# Patient Record
Sex: FEMALE | Race: WHITE | Employment: OTHER | URBAN - METROPOLITAN AREA
[De-identification: names, ages, dates, MRNs, and addresses within clinical notes are randomized per-mention and may not be internally consistent; named-entity substitution may affect disease eponyms.]

---

## 2017-01-11 ENCOUNTER — OFFICE VISIT (OUTPATIENT)
Dept: FAMILY MEDICINE CLINIC | Age: 77
End: 2017-01-11

## 2017-01-11 VITALS
HEIGHT: 58 IN | DIASTOLIC BLOOD PRESSURE: 62 MMHG | WEIGHT: 125 LBS | HEART RATE: 76 BPM | BODY MASS INDEX: 26.24 KG/M2 | SYSTOLIC BLOOD PRESSURE: 118 MMHG

## 2017-01-11 DIAGNOSIS — I10 ESSENTIAL HYPERTENSION: Primary | Chronic | ICD-10-CM

## 2017-01-11 DIAGNOSIS — R20.0 NUMBNESS IN LEFT LEG: ICD-10-CM

## 2017-01-11 DIAGNOSIS — M79.605 LEFT LEG PAIN: ICD-10-CM

## 2017-01-11 DIAGNOSIS — I49.9 IRREGULAR HEART BEAT: ICD-10-CM

## 2017-01-11 DIAGNOSIS — M81.0 OSTEOPOROSIS: ICD-10-CM

## 2017-01-11 LAB
BASOPHILS ABSOLUTE: 0 K/UL (ref 0–0.2)
BASOPHILS RELATIVE PERCENT: 0.7 %
BILIRUBIN URINE: NEGATIVE
BLOOD, URINE: NEGATIVE
CLARITY: CLEAR
COLOR: YELLOW
EOSINOPHILS ABSOLUTE: 0.1 K/UL (ref 0–0.6)
EOSINOPHILS RELATIVE PERCENT: 1.5 %
GLUCOSE URINE: NEGATIVE MG/DL
HCT VFR BLD CALC: 41.4 % (ref 36–48)
HEMOGLOBIN: 14 G/DL (ref 12–16)
KETONES, URINE: NEGATIVE MG/DL
LEUKOCYTE ESTERASE, URINE: NEGATIVE
LYMPHOCYTES ABSOLUTE: 1.7 K/UL (ref 1–5.1)
LYMPHOCYTES RELATIVE PERCENT: 28.6 %
MCH RBC QN AUTO: 31.9 PG (ref 26–34)
MCHC RBC AUTO-ENTMCNC: 33.7 G/DL (ref 31–36)
MCV RBC AUTO: 94.7 FL (ref 80–100)
MICROSCOPIC EXAMINATION: NORMAL
MONOCYTES ABSOLUTE: 0.4 K/UL (ref 0–1.3)
MONOCYTES RELATIVE PERCENT: 6.8 %
NEUTROPHILS ABSOLUTE: 3.7 K/UL (ref 1.7–7.7)
NEUTROPHILS RELATIVE PERCENT: 62.4 %
NITRITE, URINE: NEGATIVE
PDW BLD-RTO: 12.8 % (ref 12.4–15.4)
PH UA: 7
PLATELET # BLD: 169 K/UL (ref 135–450)
PMV BLD AUTO: 9.6 FL (ref 5–10.5)
PROTEIN UA: NEGATIVE MG/DL
RBC # BLD: 4.38 M/UL (ref 4–5.2)
SPECIFIC GRAVITY UA: 1.01
URINE TYPE: NORMAL
UROBILINOGEN, URINE: 0.2 E.U./DL
WBC # BLD: 5.9 K/UL (ref 4–11)

## 2017-01-11 PROCEDURE — 81003 URINALYSIS AUTO W/O SCOPE: CPT | Performed by: FAMILY MEDICINE

## 2017-01-11 PROCEDURE — 99214 OFFICE O/P EST MOD 30 MIN: CPT | Performed by: FAMILY MEDICINE

## 2017-01-11 PROCEDURE — 36415 COLL VENOUS BLD VENIPUNCTURE: CPT | Performed by: FAMILY MEDICINE

## 2017-01-11 PROCEDURE — 93000 ELECTROCARDIOGRAM COMPLETE: CPT | Performed by: FAMILY MEDICINE

## 2017-01-11 RX ORDER — ALENDRONATE SODIUM 70 MG/1
70 TABLET ORAL
Qty: 13 TABLET | Refills: 3 | Status: SHIPPED | OUTPATIENT
Start: 2017-01-11 | End: 2018-01-10 | Stop reason: ALTCHOICE

## 2017-01-11 RX ORDER — TRIAMTERENE AND HYDROCHLOROTHIAZIDE 37.5; 25 MG/1; MG/1
1 TABLET ORAL DAILY
Qty: 90 TABLET | Refills: 1 | Status: SHIPPED | OUTPATIENT
Start: 2017-01-11 | End: 2017-07-10 | Stop reason: SDUPTHER

## 2017-01-11 RX ORDER — GABAPENTIN 300 MG/1
300 CAPSULE ORAL NIGHTLY
Qty: 90 CAPSULE | Refills: 3 | Status: SHIPPED | OUTPATIENT
Start: 2017-01-11 | End: 2018-01-10 | Stop reason: ALTCHOICE

## 2017-01-11 ASSESSMENT — ENCOUNTER SYMPTOMS
SHORTNESS OF BREATH: 0
BACK PAIN: 1

## 2017-01-12 LAB
ANION GAP SERPL CALCULATED.3IONS-SCNC: 11 MMOL/L (ref 3–16)
BUN BLDV-MCNC: 23 MG/DL (ref 7–20)
CALCIUM SERPL-MCNC: 9.4 MG/DL (ref 8.3–10.6)
CHLORIDE BLD-SCNC: 101 MMOL/L (ref 99–110)
CO2: 30 MMOL/L (ref 21–32)
CREAT SERPL-MCNC: 0.7 MG/DL (ref 0.6–1.2)
GFR AFRICAN AMERICAN: >60
GFR NON-AFRICAN AMERICAN: >60
GLUCOSE BLD-MCNC: 93 MG/DL (ref 70–99)
POTASSIUM SERPL-SCNC: 4.2 MMOL/L (ref 3.5–5.1)
SODIUM BLD-SCNC: 142 MMOL/L (ref 136–145)
TSH SERPL DL<=0.05 MIU/L-ACNC: 0.64 UIU/ML (ref 0.27–4.2)

## 2017-01-17 ENCOUNTER — HOSPITAL ENCOUNTER (OUTPATIENT)
Dept: OTHER | Age: 77
Discharge: HOME | End: 2017-01-31
Attending: FAMILY MEDICINE | Admitting: FAMILY MEDICINE

## 2017-02-01 ENCOUNTER — HOSPITAL ENCOUNTER (OUTPATIENT)
Dept: OTHER | Age: 77
Discharge: HOME | End: 2017-02-14
Attending: FAMILY MEDICINE | Admitting: FAMILY MEDICINE

## 2017-02-06 ENCOUNTER — OFFICE VISIT (OUTPATIENT)
Dept: FAMILY MEDICINE CLINIC | Age: 77
End: 2017-02-06

## 2017-02-06 VITALS
WEIGHT: 125.4 LBS | HEIGHT: 58 IN | BODY MASS INDEX: 26.32 KG/M2 | DIASTOLIC BLOOD PRESSURE: 72 MMHG | SYSTOLIC BLOOD PRESSURE: 134 MMHG | HEART RATE: 70 BPM

## 2017-02-06 DIAGNOSIS — I47.1 SVT (SUPRAVENTRICULAR TACHYCARDIA) (HCC): Primary | ICD-10-CM

## 2017-02-06 DIAGNOSIS — M54.42 CHRONIC LEFT-SIDED LOW BACK PAIN WITH LEFT-SIDED SCIATICA: ICD-10-CM

## 2017-02-06 DIAGNOSIS — G89.29 CHRONIC LEFT-SIDED LOW BACK PAIN WITH LEFT-SIDED SCIATICA: ICD-10-CM

## 2017-02-06 PROBLEM — M54.41 CHRONIC LOW BACK PAIN WITH RIGHT-SIDED SCIATICA: Status: ACTIVE | Noted: 2017-02-06

## 2017-02-06 PROBLEM — I47.10 SVT (SUPRAVENTRICULAR TACHYCARDIA): Status: ACTIVE | Noted: 2017-02-06

## 2017-02-06 PROBLEM — M54.41 CHRONIC LOW BACK PAIN WITH RIGHT-SIDED SCIATICA: Status: RESOLVED | Noted: 2017-02-06 | Resolved: 2017-02-06

## 2017-02-06 PROCEDURE — 99213 OFFICE O/P EST LOW 20 MIN: CPT | Performed by: FAMILY MEDICINE

## 2017-02-06 ASSESSMENT — ENCOUNTER SYMPTOMS: BACK PAIN: 1

## 2017-02-23 LAB
LEFT VENTRICULAR EJECTION FRACTION MODE: NORMAL
LV EF: NORMAL %

## 2017-03-01 PROBLEM — I34.0 MITRAL REGURGITATION: Status: ACTIVE | Noted: 2017-03-01

## 2017-03-01 PROBLEM — I35.1 NONRHEUMATIC AORTIC VALVE INSUFFICIENCY: Status: ACTIVE | Noted: 2017-03-01

## 2017-03-16 ENCOUNTER — OFFICE VISIT (OUTPATIENT)
Dept: FAMILY MEDICINE CLINIC | Age: 77
End: 2017-03-16

## 2017-03-16 VITALS
WEIGHT: 127.8 LBS | DIASTOLIC BLOOD PRESSURE: 78 MMHG | HEART RATE: 82 BPM | HEIGHT: 58 IN | BODY MASS INDEX: 26.83 KG/M2 | SYSTOLIC BLOOD PRESSURE: 118 MMHG

## 2017-03-16 DIAGNOSIS — M54.50 ACUTE LEFT-SIDED LOW BACK PAIN WITHOUT SCIATICA: Primary | ICD-10-CM

## 2017-03-16 PROCEDURE — 99213 OFFICE O/P EST LOW 20 MIN: CPT | Performed by: FAMILY MEDICINE

## 2017-03-16 PROCEDURE — 96372 THER/PROPH/DIAG INJ SC/IM: CPT | Performed by: FAMILY MEDICINE

## 2017-03-16 RX ORDER — METOPROLOL SUCCINATE 25 MG/1
1 TABLET, EXTENDED RELEASE ORAL DAILY
Refills: 1 | COMMUNITY
Start: 2017-03-07 | End: 2018-04-06 | Stop reason: DRUGHIGH

## 2017-03-16 RX ORDER — OXYCODONE HYDROCHLORIDE AND ACETAMINOPHEN 5; 325 MG/1; MG/1
1 TABLET ORAL 2 TIMES DAILY PRN
Qty: 20 TABLET | Refills: 0 | Status: SHIPPED | OUTPATIENT
Start: 2017-03-16 | End: 2018-01-10

## 2017-03-16 RX ORDER — RIVAROXABAN 20 MG/1
1 TABLET, FILM COATED ORAL DAILY
Refills: 1 | COMMUNITY
Start: 2017-03-07 | End: 2021-03-24 | Stop reason: ALTCHOICE

## 2017-03-17 ASSESSMENT — ENCOUNTER SYMPTOMS: BACK PAIN: 1

## 2017-03-20 ENCOUNTER — TELEPHONE (OUTPATIENT)
Dept: FAMILY MEDICINE CLINIC | Age: 77
End: 2017-03-20

## 2017-03-20 DIAGNOSIS — S32.000S LUMBAR COMPRESSION FRACTURE, SEQUELA: Primary | ICD-10-CM

## 2017-03-20 DIAGNOSIS — M48.56XA COLLAPSED VERTEBRA, NOT ELSEWHERE CLASSIFIED, LUMBAR REGION, INITIAL ENCOUNTER FOR FRACTURE (HCC): ICD-10-CM

## 2017-03-21 ENCOUNTER — TELEPHONE (OUTPATIENT)
Dept: FAMILY MEDICINE CLINIC | Age: 77
End: 2017-03-21

## 2017-04-03 ENCOUNTER — HOSPITAL ENCOUNTER (OUTPATIENT)
Dept: MRI IMAGING | Age: 77
Discharge: HOME | End: 2017-04-03
Attending: PHYSICIAN ASSISTANT | Admitting: PHYSICIAN ASSISTANT

## 2017-04-03 DIAGNOSIS — M54.6 THORACIC SPINE PAIN: ICD-10-CM

## 2017-04-03 DIAGNOSIS — M79.10 MUSCLE ACHE: ICD-10-CM

## 2017-06-07 ENCOUNTER — HOSPITAL ENCOUNTER (OUTPATIENT)
Dept: MRI IMAGING | Age: 77
Discharge: HOME | End: 2017-06-07
Attending: ORTHOPAEDIC SURGERY | Admitting: ORTHOPAEDIC SURGERY

## 2017-06-07 DIAGNOSIS — M80.88XA OTHER OSTEOPOROSIS WITH CURRENT PATHOLOGICAL FRACTURE, VERTEBRA(E), INITIAL ENCOUNTER FOR FRACTURE (HCC): ICD-10-CM

## 2017-06-07 DIAGNOSIS — M80.88XA OSTEOPOROTIC COMPRESSION FRACTURE OF SPINE, INITIAL ENCOUNTER: ICD-10-CM

## 2017-07-19 ENCOUNTER — OFFICE VISIT (OUTPATIENT)
Dept: FAMILY MEDICINE CLINIC | Age: 77
End: 2017-07-19

## 2017-07-19 VITALS
WEIGHT: 122 LBS | HEART RATE: 66 BPM | SYSTOLIC BLOOD PRESSURE: 120 MMHG | HEIGHT: 58 IN | BODY MASS INDEX: 25.61 KG/M2 | DIASTOLIC BLOOD PRESSURE: 60 MMHG

## 2017-07-19 DIAGNOSIS — I10 ESSENTIAL HYPERTENSION: Chronic | ICD-10-CM

## 2017-07-19 PROCEDURE — 99213 OFFICE O/P EST LOW 20 MIN: CPT | Performed by: FAMILY MEDICINE

## 2017-07-19 RX ORDER — TRIAMTERENE AND HYDROCHLOROTHIAZIDE 37.5; 25 MG/1; MG/1
TABLET ORAL
Qty: 90 TABLET | Refills: 0 | Status: SHIPPED | OUTPATIENT
Start: 2017-07-19 | End: 2017-07-19 | Stop reason: SDUPTHER

## 2017-07-19 RX ORDER — TRIAMTERENE AND HYDROCHLOROTHIAZIDE 37.5; 25 MG/1; MG/1
TABLET ORAL
Qty: 90 TABLET | Refills: 0 | Status: SHIPPED | OUTPATIENT
Start: 2017-07-19 | End: 2018-01-10 | Stop reason: SDUPTHER

## 2017-07-19 ASSESSMENT — ENCOUNTER SYMPTOMS: SHORTNESS OF BREATH: 0

## 2017-12-15 DIAGNOSIS — M81.0 AGE RELATED OSTEOPOROSIS, UNSPECIFIED PATHOLOGICAL FRACTURE PRESENCE: Primary | Chronic | ICD-10-CM

## 2017-12-15 RX ORDER — ALENDRONATE SODIUM 70 MG/1
70 TABLET ORAL
Qty: 13 TABLET | Refills: 0 | Status: SHIPPED | OUTPATIENT
Start: 2017-12-15 | End: 2018-01-10 | Stop reason: ALTCHOICE

## 2018-01-10 ENCOUNTER — OFFICE VISIT (OUTPATIENT)
Dept: FAMILY MEDICINE CLINIC | Age: 78
End: 2018-01-10

## 2018-01-10 VITALS
DIASTOLIC BLOOD PRESSURE: 70 MMHG | BODY MASS INDEX: 26.2 KG/M2 | WEIGHT: 124.8 LBS | SYSTOLIC BLOOD PRESSURE: 130 MMHG | HEART RATE: 85 BPM | HEIGHT: 58 IN

## 2018-01-10 DIAGNOSIS — I47.1 SVT (SUPRAVENTRICULAR TACHYCARDIA) (HCC): ICD-10-CM

## 2018-01-10 DIAGNOSIS — E65 ABDOMINAL PANNUS: ICD-10-CM

## 2018-01-10 DIAGNOSIS — B36.9 FUNGAL INFECTION OF SKIN: ICD-10-CM

## 2018-01-10 DIAGNOSIS — G25.0 ESSENTIAL TREMOR: ICD-10-CM

## 2018-01-10 DIAGNOSIS — I10 ESSENTIAL HYPERTENSION: Primary | Chronic | ICD-10-CM

## 2018-01-10 PROBLEM — Z87.81 HISTORY OF COMPRESSION FRACTURE OF SPINE: Status: ACTIVE | Noted: 2018-01-10

## 2018-01-10 LAB
ANION GAP SERPL CALCULATED.3IONS-SCNC: 11 MMOL/L (ref 3–16)
BILIRUBIN URINE: NEGATIVE
BLOOD, URINE: NEGATIVE
BUN BLDV-MCNC: 19 MG/DL (ref 7–20)
CALCIUM SERPL-MCNC: 9.3 MG/DL (ref 8.3–10.6)
CHLORIDE BLD-SCNC: 100 MMOL/L (ref 99–110)
CHOLESTEROL, TOTAL: 188 MG/DL (ref 0–199)
CLARITY: CLEAR
CO2: 31 MMOL/L (ref 21–32)
COLOR: YELLOW
CREAT SERPL-MCNC: 0.7 MG/DL (ref 0.6–1.2)
EPITHELIAL CELLS, UA: 0 /HPF (ref 0–5)
GFR AFRICAN AMERICAN: >60
GFR NON-AFRICAN AMERICAN: >60
GLUCOSE BLD-MCNC: 94 MG/DL (ref 70–99)
GLUCOSE URINE: NEGATIVE MG/DL
HDLC SERPL-MCNC: 55 MG/DL (ref 40–60)
HYALINE CASTS: 0 /LPF (ref 0–8)
KETONES, URINE: NEGATIVE MG/DL
LDL CHOLESTEROL CALCULATED: 111 MG/DL
LEUKOCYTE ESTERASE, URINE: ABNORMAL
MICROSCOPIC EXAMINATION: YES
NITRITE, URINE: NEGATIVE
PH UA: 7.5
POTASSIUM SERPL-SCNC: 3.9 MMOL/L (ref 3.5–5.1)
PROTEIN UA: NEGATIVE MG/DL
RBC UA: 3 /HPF (ref 0–4)
SODIUM BLD-SCNC: 142 MMOL/L (ref 136–145)
SPECIFIC GRAVITY UA: 1.01
TRIGL SERPL-MCNC: 108 MG/DL (ref 0–150)
URINE TYPE: ABNORMAL
UROBILINOGEN, URINE: 0.2 E.U./DL
VLDLC SERPL CALC-MCNC: 22 MG/DL
WBC UA: 3 /HPF (ref 0–5)

## 2018-01-10 PROCEDURE — 99214 OFFICE O/P EST MOD 30 MIN: CPT | Performed by: FAMILY MEDICINE

## 2018-01-10 PROCEDURE — 81003 URINALYSIS AUTO W/O SCOPE: CPT | Performed by: FAMILY MEDICINE

## 2018-01-10 PROCEDURE — 36415 COLL VENOUS BLD VENIPUNCTURE: CPT | Performed by: FAMILY MEDICINE

## 2018-01-10 RX ORDER — METOPROLOL SUCCINATE 50 MG/1
50 TABLET, EXTENDED RELEASE ORAL DAILY
Qty: 90 TABLET | Refills: 3 | Status: SHIPPED | OUTPATIENT
Start: 2018-01-10 | End: 2018-12-05 | Stop reason: SDUPTHER

## 2018-01-10 RX ORDER — TRIAMTERENE AND HYDROCHLOROTHIAZIDE 37.5; 25 MG/1; MG/1
TABLET ORAL
Qty: 90 TABLET | Refills: 1 | Status: SHIPPED | OUTPATIENT
Start: 2018-01-10 | End: 2018-01-10 | Stop reason: ALTCHOICE

## 2018-01-10 ASSESSMENT — PATIENT HEALTH QUESTIONNAIRE - PHQ9
SUM OF ALL RESPONSES TO PHQ QUESTIONS 1-9: 0
1. LITTLE INTEREST OR PLEASURE IN DOING THINGS: 0
SUM OF ALL RESPONSES TO PHQ9 QUESTIONS 1 & 2: 0
2. FEELING DOWN, DEPRESSED OR HOPELESS: 0

## 2018-01-10 ASSESSMENT — ENCOUNTER SYMPTOMS: SHORTNESS OF BREATH: 0

## 2018-01-10 NOTE — PATIENT INSTRUCTIONS
with a straw. When should you call for help? Watch closely for changes in your health, and be sure to contact your doctor if:  ? · You notice your tremors are getting worse. ? · You can't do your everyday activities because of your tremors. ? · You are sad and embarrassed about your shaking. Where can you learn more? Go to [email or web address] and sign in to your [employer] account. Enter B746 in the Contact Solutions box to learn more about \"Benign Essential Tremor: Care Instructions. \"     If you do not have an account, please click on the \"Sign Up Now\" link. Current as of: October 14, 2016  Content Version: 11.5  © 6133-9523 [employer]. Care instructions adapted under license by [facility] ([facility]). If you have questions about a medical condition or this instruction, always ask your healthcare professional. [address] any warranty or liability for your use of this information.

## 2018-01-10 NOTE — PROGRESS NOTES
Patient ID: Trinidad Fajardo 1940      Hypertension   This is a chronic problem. The current episode started more than 1 year ago. The problem is unchanged. The problem is controlled. Pertinent negatives include no palpitations or shortness of breath. Risk factors for coronary artery disease include post-menopausal state. Past treatments include beta blockers and diuretics. Tremor: Ongoing for years. Went to an neurologist.  He gave her medication that did not work. That neurologist has now . Should like a referral to another neurologist.  She complains of tremor in her hands her feet and her mouth. Is getting more difficult to drive because of the tremor. She denies consuming caffeine. Pannus: Has had a pannus on her abdomen ever since she had a hysterectomy. It causes the skin underneath he gets sweaty and foul-smelling. She gets rashes there. She has to wear powder in that part of her body all the time. And still the problem recurs. Should like to get this skin fold removed. She does not want prescription medication powder for the rash. SVT: Continues to take the Toprol. Has no more palpitation. Review of Systems   Respiratory: Negative for shortness of breath. Cardiovascular: Negative for palpitations.        Patient Active Problem List   Diagnosis    Essential hypertension    Osteoporosis    Hx of colonic polyps    Degenerative disc disease, lumbar    Spondylolisthesis of lumbar region    Osteoarthritis of cervical spine    SVT (supraventricular tachycardia) (HCC)    Chronic low back pain with left-sided sciatica    Nonrheumatic aortic valve insufficiency    Mitral regurgitation    History of compression fracture of spine       Past Medical History:   Diagnosis Date    Compression fracture of lumbar spine, non-traumatic ([address] 2013    Compression fx, thoracic spine ([address]     T6 - scheduled for vertebroplasty 2015    Degenerative disc disease, lumbar 2013

## 2018-01-11 ENCOUNTER — TELEPHONE (OUTPATIENT)
Dept: FAMILY MEDICINE CLINIC | Age: 78
End: 2018-01-11

## 2018-01-11 NOTE — TELEPHONE ENCOUNTER
Patient called to let you know the name of the medication Dr. Joseph Morrison had given her for tremors. It was Carvidopa Levidopa 50 mg 1 tablet three times daily.

## 2018-02-13 DIAGNOSIS — M81.0 AGE-RELATED OSTEOPOROSIS WITHOUT CURRENT PATHOLOGICAL FRACTURE: Primary | ICD-10-CM

## 2018-02-14 ENCOUNTER — TELEPHONE (OUTPATIENT)
Dept: FAMILY MEDICINE CLINIC | Age: 78
End: 2018-02-14

## 2018-02-19 ENCOUNTER — HOSPITAL ENCOUNTER (OUTPATIENT)
Dept: WOMENS IMAGING | Age: 78
Discharge: HOME | End: 2018-02-19
Attending: FAMILY MEDICINE | Admitting: FAMILY MEDICINE

## 2018-02-19 DIAGNOSIS — M81.0 AGE-RELATED OSTEOPOROSIS WITHOUT CURRENT PATHOLOGICAL FRACTURE: ICD-10-CM

## 2018-04-06 ENCOUNTER — TELEPHONE (OUTPATIENT)
Dept: FAMILY MEDICINE CLINIC | Age: 78
End: 2018-04-06

## 2018-04-20 ENCOUNTER — TELEPHONE (OUTPATIENT)
Dept: FAMILY MEDICINE CLINIC | Age: 78
End: 2018-04-20

## 2018-05-08 ENCOUNTER — OFFICE VISIT (OUTPATIENT)
Dept: FAMILY MEDICINE CLINIC | Age: 78
End: 2018-05-08

## 2018-05-08 VITALS
HEART RATE: 122 BPM | OXYGEN SATURATION: 94 % | DIASTOLIC BLOOD PRESSURE: 80 MMHG | WEIGHT: 117 LBS | HEIGHT: 58 IN | TEMPERATURE: 99 F | BODY MASS INDEX: 24.56 KG/M2 | SYSTOLIC BLOOD PRESSURE: 140 MMHG

## 2018-05-08 DIAGNOSIS — R05.9 COUGH: Primary | ICD-10-CM

## 2018-05-08 DIAGNOSIS — R11.2 NAUSEA AND VOMITING, INTRACTABILITY OF VOMITING NOT SPECIFIED, UNSPECIFIED VOMITING TYPE: ICD-10-CM

## 2018-05-08 DIAGNOSIS — E86.0 DEHYDRATION: ICD-10-CM

## 2018-05-08 DIAGNOSIS — R00.0 TACHYCARDIA: ICD-10-CM

## 2018-05-08 DIAGNOSIS — G25.2 RESTING TREMOR: ICD-10-CM

## 2018-05-08 PROBLEM — A41.9 SEPSIS DUE TO PNEUMONIA (HCC): Status: ACTIVE | Noted: 2018-05-08

## 2018-05-08 PROBLEM — J18.9 SEPSIS DUE TO PNEUMONIA (HCC): Status: ACTIVE | Noted: 2018-05-08

## 2018-05-08 PROBLEM — G25.0 ESSENTIAL TREMOR: Status: RESOLVED | Noted: 2018-01-10 | Resolved: 2018-05-08

## 2018-05-08 PROCEDURE — 99215 OFFICE O/P EST HI 40 MIN: CPT | Performed by: FAMILY MEDICINE

## 2018-05-08 ASSESSMENT — ENCOUNTER SYMPTOMS
SHORTNESS OF BREATH: 1
NAUSEA: 1
VOMITING: 1

## 2018-05-11 PROBLEM — Z79.01 ANTICOAGULATED: Status: ACTIVE | Noted: 2018-05-11

## 2018-05-11 PROBLEM — I48.0 PAROXYSMAL ATRIAL FIBRILLATION (HCC): Status: ACTIVE | Noted: 2018-05-11

## 2018-05-14 ENCOUNTER — TELEPHONE (OUTPATIENT)
Dept: PHARMACY | Facility: CLINIC | Age: 78
End: 2018-05-14

## 2018-05-29 ENCOUNTER — TELEPHONE (OUTPATIENT)
Dept: FAMILY MEDICINE CLINIC | Age: 78
End: 2018-05-29

## 2018-05-29 ENCOUNTER — OFFICE VISIT (OUTPATIENT)
Dept: FAMILY MEDICINE CLINIC | Age: 78
End: 2018-05-29

## 2018-05-29 VITALS
WEIGHT: 117 LBS | SYSTOLIC BLOOD PRESSURE: 148 MMHG | HEART RATE: 65 BPM | BODY MASS INDEX: 24.56 KG/M2 | HEIGHT: 58 IN | OXYGEN SATURATION: 97 % | DIASTOLIC BLOOD PRESSURE: 70 MMHG

## 2018-05-29 DIAGNOSIS — J18.9 COMMUNITY ACQUIRED PNEUMONIA, UNSPECIFIED LATERALITY: ICD-10-CM

## 2018-05-29 DIAGNOSIS — R03.0 ELEVATED BLOOD PRESSURE READING: ICD-10-CM

## 2018-05-29 DIAGNOSIS — Z09 HOSPITAL DISCHARGE FOLLOW-UP: Primary | ICD-10-CM

## 2018-05-29 PROCEDURE — 1111F DSCHRG MED/CURRENT MED MERGE: CPT | Performed by: FAMILY MEDICINE

## 2018-05-29 PROCEDURE — 99215 OFFICE O/P EST HI 40 MIN: CPT | Performed by: FAMILY MEDICINE

## 2018-07-11 ENCOUNTER — OFFICE VISIT (OUTPATIENT)
Dept: FAMILY MEDICINE CLINIC | Age: 78
End: 2018-07-11

## 2018-07-11 VITALS
SYSTOLIC BLOOD PRESSURE: 120 MMHG | HEART RATE: 67 BPM | WEIGHT: 114.6 LBS | BODY MASS INDEX: 24.05 KG/M2 | HEIGHT: 58 IN | DIASTOLIC BLOOD PRESSURE: 60 MMHG

## 2018-07-11 DIAGNOSIS — I10 ESSENTIAL HYPERTENSION: Primary | Chronic | ICD-10-CM

## 2018-07-11 PROCEDURE — 99213 OFFICE O/P EST LOW 20 MIN: CPT | Performed by: FAMILY MEDICINE

## 2018-07-11 ASSESSMENT — ENCOUNTER SYMPTOMS: SHORTNESS OF BREATH: 0

## 2018-07-11 NOTE — PROGRESS NOTES
Patient ID: Keila Comer 1940      Hypertension   This is a chronic problem. The current episode started more than 1 year ago. The problem is unchanged. The problem is controlled. Pertinent negatives include no palpitations or shortness of breath. Risk factors for coronary artery disease include post-menopausal state. Past treatments include beta blockers and diuretics. Tremor: much improved with reflexology    Review of Systems   Respiratory: Negative for shortness of breath. Cardiovascular: Negative for palpitations.        Patient Active Problem List   Diagnosis    Essential hypertension    Osteoporosis    Hx of colonic polyps    Degenerative disc disease, lumbar    Spondylolisthesis of lumbar region    Osteoarthritis of cervical spine    SV[facility]ricular [facility]) [facility]    Chronic low back pain with left-sided sciatica    Nonrheumatic aortic valve insufficiency    Mitral regurgitation    History of compression fracture of spine    Age-related osteoporosis without current pathological fracture    Sepsis due to pneumonia ([address]    Intractable nausea and vomiting    Anticoagulated    Paroxysmal atrial fibrillation ([address]       Past Medical History:   Diagnosis Date    Compression fracture of lumbar spine, non-traumatic ([address] 6/2013    Compression fx, thoracic spine (HCC)     T6 - scheduled for vertebroplasty 8/24/2015    Degenerative disc disease, lumbar 6/26/2013    Environmental allergies     Hx of colonic polyps 7/2006    Hypertension 2006    Osteoarthritis of lumbar spine     Osteoporosis     PONV (postoperative nausea and vomiting)     \"just with the hernia surgery\"    Spondylolisthesis of lumbar region 6/26/2013    Surgical menopause     TIA (transient ischemic attack) 1/2006    Unspecified cerebral artery occlusion with cerebral infarction     \"had light stroke 6 yrs ago-everything went black and thought I was gone but only lasted about 10 minuts\" no tremor. Psychiatric: She has a normal mood and affect. Vitals:    07/11/18 1102   BP: 120/60   Site: Right Arm   Position: Sitting   Cuff Size: Medium Adult   Pulse: 67   Weight: 114 lb 9.6 oz (52 kg)   Height: 4' 10\" (1.473 m)     Body mass index is 23.95 kg/m². Wt Readings from Last 3 Encounters:   07/11/18 114 lb 9.6 oz (52 kg)   05/29/18 117 lb (53.1 kg)   05/09/18 117 lb 8 oz (53.3 kg)     BP Readings from Last 3 Encounters:   07/11/18 120/60   05/29/18 (!) 148/70   05/11/18 (!) 154/77          Lab Review   No visits with results within 2 Month(s) from this visit. Latest known visit with results is:   Admission on 05/08/2018, Discharged on 05/11/2018   No results displayed because visit has over 200 results. Assessment:       Diagnosis Orders   1.  Essential hypertension               Plan:      Continue current medication fir HTN which is well controlled

## 2018-08-14 ENCOUNTER — TELEPHONE (OUTPATIENT)
Dept: FAMILY MEDICINE CLINIC | Age: 78
End: 2018-08-14

## 2018-08-14 NOTE — TELEPHONE ENCOUNTER
There is no new pneumonia vaccine. You can tell her she can get the shingles vaccine at the pharmacy. I don't need to be involved    Appt for the leg pain since requesting a controlled substance.

## 2018-08-15 ENCOUNTER — OFFICE VISIT (OUTPATIENT)
Dept: FAMILY MEDICINE CLINIC | Age: 78
End: 2018-08-15

## 2018-08-15 VITALS
SYSTOLIC BLOOD PRESSURE: 118 MMHG | DIASTOLIC BLOOD PRESSURE: 70 MMHG | HEART RATE: 79 BPM | BODY MASS INDEX: 24.24 KG/M2 | WEIGHT: 116 LBS

## 2018-08-15 DIAGNOSIS — M54.42 CHRONIC LEFT-SIDED LOW BACK PAIN WITH LEFT-SIDED SCIATICA: Primary | ICD-10-CM

## 2018-08-15 DIAGNOSIS — G89.29 CHRONIC LEFT-SIDED LOW BACK PAIN WITH LEFT-SIDED SCIATICA: Primary | ICD-10-CM

## 2018-08-15 PROCEDURE — 99213 OFFICE O/P EST LOW 20 MIN: CPT | Performed by: FAMILY MEDICINE

## 2018-08-15 RX ORDER — GABAPENTIN 300 MG/1
300 CAPSULE ORAL NIGHTLY
Qty: 30 CAPSULE | Refills: 0 | Status: SHIPPED | OUTPATIENT
Start: 2018-08-15 | End: 2018-12-05

## 2018-08-15 RX ORDER — GABAPENTIN 300 MG/1
300 CAPSULE ORAL NIGHTLY
Qty: 90 CAPSULE | Refills: 0 | Status: SHIPPED | OUTPATIENT
Start: 2018-08-15 | End: 2018-12-05 | Stop reason: SDUPTHER

## 2018-08-15 ASSESSMENT — ENCOUNTER SYMPTOMS: BACK PAIN: 1

## 2018-08-15 NOTE — PROGRESS NOTES
Patient ID: Yasmine Royal 1940    . Chief Complaint   Patient presents with    Leg Pain     left leg 5/10         Back Pain   This is a recurrent problem. The current episode started more than 1 year ago. The problem occurs daily. The problem has been gradually worsening since onset. The pain is present in the lumbar spine. The pain radiates to the left thigh and left foot. The pain is at a severity of 10/10. The pain is severe. Risk factors include menopause. Treatments tried: would like to re-start Gabapentin. The treatment provided significant relief. Review of Systems   Constitutional: Negative. Musculoskeletal: Positive for back pain and gait problem.        Patient Active Problem List   Diagnosis    Essential hypertension    Osteoporosis    Hx of colonic polyps    Degenerative disc disease, lumbar    Spondylolisthesis of lumbar region    Osteoarthritis of cervical spine    SVT (supraventricular tachycardia) (HCC)    Nonrheumatic aortic valve insufficiency    Mitral regurgitation    History of compression fracture of spine    Age-related osteoporosis without current pathological fracture    Sepsis due to pneumonia ([address]    Intractable nausea and vomiting    Anticoagulated    Paroxysmal atrial fibrillation ([address]       Past Medical History:   Diagnosis Date    Compression fracture of lumbar spine, non-traumatic ([address] 6/2013    Compression fx, thoracic spine ([address]     T6 - scheduled for vertebroplasty 8/24/2015    Degenerative disc disease, lumbar 6/26/2013    Environmental allergies     Hx of colonic polyps 7/2006    Hypertension 2006    Osteoarthritis of lumbar spine     Osteoporosis     PONV (postoperative nausea and vomiting)     \"just with the hernia surgery\"    Spondylolisthesis of lumbar region 6/26/2013    Surgical menopause     TIA (transient ischemic attack) 1/2006    Unspecified cerebral artery occlusion with cerebral infarction     \"had light stroke 6 yrs

## 2018-12-05 ENCOUNTER — OFFICE VISIT (OUTPATIENT)
Dept: FAMILY MEDICINE CLINIC | Age: 78
End: 2018-12-05
Payer: MEDICARE

## 2018-12-05 VITALS
DIASTOLIC BLOOD PRESSURE: 70 MMHG | BODY MASS INDEX: 25.94 KG/M2 | WEIGHT: 123.6 LBS | SYSTOLIC BLOOD PRESSURE: 124 MMHG | HEART RATE: 72 BPM | HEIGHT: 58 IN

## 2018-12-05 DIAGNOSIS — Z23 NEED FOR INFLUENZA VACCINATION: ICD-10-CM

## 2018-12-05 DIAGNOSIS — I10 ESSENTIAL HYPERTENSION: Chronic | ICD-10-CM

## 2018-12-05 DIAGNOSIS — Z23 NEED FOR TETANUS BOOSTER: ICD-10-CM

## 2018-12-05 DIAGNOSIS — G25.0 ESSENTIAL TREMOR: ICD-10-CM

## 2018-12-05 DIAGNOSIS — M54.42 CHRONIC LEFT-SIDED LOW BACK PAIN WITH LEFT-SIDED SCIATICA: Primary | ICD-10-CM

## 2018-12-05 DIAGNOSIS — G89.29 CHRONIC LEFT-SIDED LOW BACK PAIN WITH LEFT-SIDED SCIATICA: Primary | ICD-10-CM

## 2018-12-05 DIAGNOSIS — I47.1 SVT (SUPRAVENTRICULAR TACHYCARDIA) (HCC): ICD-10-CM

## 2018-12-05 PROCEDURE — 99214 OFFICE O/P EST MOD 30 MIN: CPT | Performed by: FAMILY MEDICINE

## 2018-12-05 PROCEDURE — 90715 TDAP VACCINE 7 YRS/> IM: CPT | Performed by: FAMILY MEDICINE

## 2018-12-05 PROCEDURE — 90471 IMMUNIZATION ADMIN: CPT | Performed by: FAMILY MEDICINE

## 2018-12-05 PROCEDURE — G0008 ADMIN INFLUENZA VIRUS VAC: HCPCS | Performed by: FAMILY MEDICINE

## 2018-12-05 PROCEDURE — 90662 IIV NO PRSV INCREASED AG IM: CPT | Performed by: FAMILY MEDICINE

## 2018-12-05 RX ORDER — GABAPENTIN 300 MG/1
300 CAPSULE ORAL NIGHTLY
COMMUNITY
End: 2018-12-05

## 2018-12-05 RX ORDER — METOPROLOL SUCCINATE 50 MG/1
50 TABLET, EXTENDED RELEASE ORAL DAILY
Qty: 90 TABLET | Refills: 1 | Status: SHIPPED | OUTPATIENT
Start: 2018-12-05 | End: 2019-05-29 | Stop reason: SDUPTHER

## 2018-12-05 RX ORDER — GABAPENTIN 300 MG/1
300 CAPSULE ORAL NIGHTLY
Qty: 90 CAPSULE | Refills: 1 | Status: SHIPPED | OUTPATIENT
Start: 2018-12-05 | End: 2019-11-20

## 2018-12-05 ASSESSMENT — ENCOUNTER SYMPTOMS
CHEST TIGHTNESS: 0
BLOOD IN STOOL: 0
SHORTNESS OF BREATH: 0
BACK PAIN: 1
DIARRHEA: 0
CONSTIPATION: 0

## 2018-12-05 NOTE — PROGRESS NOTES
Vaccine Information Sheet, \"Influenza - Inactivated\"  given to Stacie Beyer, or parent/legal guardian of  Stacie Beyer and verbalized understanding. Patient responses:    Have you ever had a reaction to a flu vaccine? No  Are you able to eat eggs without adverse effects? Yes  Do you have any current illness? No  Have you ever had Guillian Converse Syndrome? No    Flu vaccine given per order. Please see immunization tab.

## 2019-05-29 ENCOUNTER — OFFICE VISIT (OUTPATIENT)
Dept: FAMILY MEDICINE CLINIC | Age: 79
End: 2019-05-29
Payer: MEDICARE

## 2019-05-29 VITALS
DIASTOLIC BLOOD PRESSURE: 62 MMHG | BODY MASS INDEX: 26.36 KG/M2 | HEART RATE: 62 BPM | WEIGHT: 125.6 LBS | HEIGHT: 58 IN | SYSTOLIC BLOOD PRESSURE: 122 MMHG

## 2019-05-29 DIAGNOSIS — I10 ESSENTIAL HYPERTENSION: Chronic | ICD-10-CM

## 2019-05-29 DIAGNOSIS — I47.1 SVT (SUPRAVENTRICULAR TACHYCARDIA) (HCC): ICD-10-CM

## 2019-05-29 DIAGNOSIS — G89.29 CHRONIC LEFT-SIDED LOW BACK PAIN WITH LEFT-SIDED SCIATICA: ICD-10-CM

## 2019-05-29 DIAGNOSIS — G25.0 ESSENTIAL TREMOR: ICD-10-CM

## 2019-05-29 DIAGNOSIS — M54.42 CHRONIC LEFT-SIDED LOW BACK PAIN WITH LEFT-SIDED SCIATICA: ICD-10-CM

## 2019-05-29 PROCEDURE — 36415 COLL VENOUS BLD VENIPUNCTURE: CPT | Performed by: FAMILY MEDICINE

## 2019-05-29 PROCEDURE — G8510 SCR DEP NEG, NO PLAN REQD: HCPCS | Performed by: FAMILY MEDICINE

## 2019-05-29 PROCEDURE — 99213 OFFICE O/P EST LOW 20 MIN: CPT | Performed by: FAMILY MEDICINE

## 2019-05-29 PROCEDURE — 3288F FALL RISK ASSESSMENT DOCD: CPT | Performed by: FAMILY MEDICINE

## 2019-05-29 RX ORDER — GABAPENTIN 100 MG/1
100 CAPSULE ORAL 2 TIMES DAILY
Qty: 180 CAPSULE | Refills: 1 | Status: SHIPPED | OUTPATIENT
Start: 2019-05-29 | End: 2020-06-10 | Stop reason: SDUPTHER

## 2019-05-29 RX ORDER — METOPROLOL SUCCINATE 50 MG/1
50 TABLET, EXTENDED RELEASE ORAL DAILY
Qty: 90 TABLET | Refills: 1 | Status: SHIPPED | OUTPATIENT
Start: 2019-05-29 | End: 2019-11-20 | Stop reason: SDUPTHER

## 2019-05-29 ASSESSMENT — PATIENT HEALTH QUESTIONNAIRE - PHQ9
SUM OF ALL RESPONSES TO PHQ QUESTIONS 1-9: 0
SUM OF ALL RESPONSES TO PHQ QUESTIONS 1-9: 0
SUM OF ALL RESPONSES TO PHQ9 QUESTIONS 1 & 2: 0
1. LITTLE INTEREST OR PLEASURE IN DOING THINGS: 0
2. FEELING DOWN, DEPRESSED OR HOPELESS: 0

## 2019-05-29 ASSESSMENT — ENCOUNTER SYMPTOMS
BACK PAIN: 1
SHORTNESS OF BREATH: 0

## 2019-05-29 NOTE — PROGRESS NOTES
Patient ID: Rochelle Canela 1940    . Chief Complaint   Patient presents with    Back Pain     feeling great, back healing    Hypertension         Back Pain   This is a recurrent problem. The current episode started more than 1 year ago. The problem occurs daily. The problem has been rapidly improving since onset. The pain is present in the lumbar spine. The pain radiates to the left thigh and left foot. The patient is experiencing no pain. Pertinent negatives include no chest pain. Risk factors include menopause. Treatments tried: Gabapentin. The treatment provided significant relief. Hypertension   This is a chronic problem. The current episode started more than 1 year ago. The problem is unchanged. The problem is controlled. Pertinent negatives include no chest pain, palpitations or shortness of breath. Risk factors for coronary artery disease include post-menopausal state. Past treatments include beta blockers and diuretics. Review of Systems   Respiratory: Negative for shortness of breath. Cardiovascular: Negative for chest pain and palpitations. Musculoskeletal: Positive for back pain.        Patient Active Problem List   Diagnosis    Essential hypertension    Osteoporosis    Hx of colonic polyps    Degenerative disc disease, lumbar    Spondylolisthesis of lumbar region    Osteoarthritis of cervical spine    SVT (supraventricular tachycardia) (HCC)    Chronic left-sided low back pain with left-sided sciatica    Nonrheumatic aortic valve insufficiency    Mitral regurgitation    History of compression fracture of spine    Age-related osteoporosis without current pathological fracture    Sepsis due to pneumonia ([address]    Intractable nausea and vomiting    Anticoagulated    Paroxysmal atrial fibrillation ([address]       Past Medical History:   Diagnosis Date    Compression fracture of lumbar spine, non-traumatic ([address] 6/2013    Compression fx, thoracic spine (HCC)     T6 - inhaler Inhale 2 puffs into the lungs every 6 hours as needed for Wheezing 1 Inhaler 0     No current facility-administered medications on file prior to visit. Objective:     Physical Exam   Constitutional: She appears well-developed and well-nourished. HENT:   Head: Normocephalic and atraumatic. Neck: Neck supple. Cardiovascular: Normal rate, regular rhythm, S1 normal, S2 normal and normal heart sounds. Pulmonary/Chest: Effort normal and breath sounds normal. No respiratory distress. She has no wheezes. Neurological: She is alert. Skin: Skin is warm, dry and intact. Psychiatric: She has a normal mood and affect. Nursing note and vitals reviewed. Vitals:    05/29/19 1100   BP: 122/62   Site: Left Upper Arm   Position: Sitting   Cuff Size: Medium Adult   Pulse: 62   Weight: 125 lb 9.6 oz (57 kg)   Height: 4' 10\" (1.473 m)     Body mass index is 26.25 kg/m². Wt Readings from Last 3 Encounters:   05/29/19 125 lb 9.6 oz (57 kg)   12/05/18 123 lb 9.6 oz (56.1 kg)   08/15/18 116 lb (52.6 kg)     BP Readings from Last 3 Encounters:   05/29/19 122/62   12/05/18 124/70   08/15/18 118/70          No results found for this visit on 05/29/19. The 10-year ASCVD risk score (Rosa Flannery et al., 2013) is: 22.3%    Values used to calculate the score:      Age: 78 years      Sex: Female      Is Non- : No      Diabetic: No      Tobacco smoker: No      Systolic Blood Pressure: 611 mmHg      Is BP treated: No      HDL Cholesterol: 55 mg/dL      Total Cholesterol: 188 mg/dL  Lab Review   No visits with results within 6 Month(s) from this visit. Latest known visit with results is:   Admission on 05/08/2018, Discharged on 05/11/2018   No results displayed because visit has over 200 results. Assessment:       Diagnosis Orders   1.  Essential hypertension  metoprolol succinate (TOPROL XL) 50 MG extended release tablet    Comprehensive Metabolic Panel    Lipid Panel    Microalbumin / Creatinine Urine Ratio   2. SVT (supraventricular tachycardia) (HCC)  metoprolol succinate (TOPROL XL) 50 MG extended release tablet   3. Essential tremor  metoprolol succinate (TOPROL XL) 50 MG extended release tablet   4. Chronic left-sided low back pain with left-sided sciatica  gabapentin (NEURONTIN) 100 MG capsule           Plan:      Lowering the Gabapentin dose from 300 mg nightly to 100 mg twice per day. She may continue to cut back on this    HTN stable. Re-check in 6 months.

## 2019-05-30 LAB
A/G RATIO: 1.9 (ref 1.1–2.2)
ALBUMIN SERPL-MCNC: 4.4 G/DL (ref 3.4–5)
ALP BLD-CCNC: 71 U/L (ref 40–129)
ALT SERPL-CCNC: 15 U/L (ref 10–40)
ANION GAP SERPL CALCULATED.3IONS-SCNC: 12 MMOL/L (ref 3–16)
AST SERPL-CCNC: 17 U/L (ref 15–37)
BILIRUB SERPL-MCNC: 0.7 MG/DL (ref 0–1)
BUN BLDV-MCNC: 21 MG/DL (ref 7–20)
CALCIUM SERPL-MCNC: 9.3 MG/DL (ref 8.3–10.6)
CHLORIDE BLD-SCNC: 106 MMOL/L (ref 99–110)
CHOLESTEROL, TOTAL: 169 MG/DL (ref 0–199)
CO2: 27 MMOL/L (ref 21–32)
CREAT SERPL-MCNC: 0.8 MG/DL (ref 0.6–1.2)
CREATININE URINE: 362.2 MG/DL (ref 28–259)
GFR AFRICAN AMERICAN: >60
GFR NON-AFRICAN AMERICAN: >60
GLOBULIN: 2.3 G/DL
GLUCOSE BLD-MCNC: 102 MG/DL (ref 70–99)
HDLC SERPL-MCNC: 52 MG/DL (ref 40–60)
LDL CHOLESTEROL CALCULATED: 97 MG/DL
MICROALBUMIN UR-MCNC: 5.1 MG/DL
MICROALBUMIN/CREAT UR-RTO: 14.1 MG/G (ref 0–30)
POTASSIUM SERPL-SCNC: 4.2 MMOL/L (ref 3.5–5.1)
SODIUM BLD-SCNC: 145 MMOL/L (ref 136–145)
TOTAL PROTEIN: 6.7 G/DL (ref 6.4–8.2)
TRIGL SERPL-MCNC: 99 MG/DL (ref 0–150)
VLDLC SERPL CALC-MCNC: 20 MG/DL

## 2019-11-20 ENCOUNTER — OFFICE VISIT (OUTPATIENT)
Dept: FAMILY MEDICINE CLINIC | Age: 79
End: 2019-11-20
Payer: MEDICARE

## 2019-11-20 VITALS
BODY MASS INDEX: 26.62 KG/M2 | DIASTOLIC BLOOD PRESSURE: 80 MMHG | HEART RATE: 62 BPM | SYSTOLIC BLOOD PRESSURE: 118 MMHG | WEIGHT: 126.8 LBS | HEIGHT: 58 IN

## 2019-11-20 DIAGNOSIS — I10 ESSENTIAL HYPERTENSION: Primary | Chronic | ICD-10-CM

## 2019-11-20 DIAGNOSIS — G25.0 ESSENTIAL TREMOR: ICD-10-CM

## 2019-11-20 DIAGNOSIS — I47.1 SVT (SUPRAVENTRICULAR TACHYCARDIA) (HCC): ICD-10-CM

## 2019-11-20 DIAGNOSIS — M54.50 CHRONIC RIGHT-SIDED LOW BACK PAIN WITHOUT SCIATICA: ICD-10-CM

## 2019-11-20 DIAGNOSIS — G89.29 CHRONIC RIGHT-SIDED LOW BACK PAIN WITHOUT SCIATICA: ICD-10-CM

## 2019-11-20 DIAGNOSIS — Z23 NEEDS FLU SHOT: ICD-10-CM

## 2019-11-20 PROCEDURE — 90653 IIV ADJUVANT VACCINE IM: CPT | Performed by: FAMILY MEDICINE

## 2019-11-20 PROCEDURE — 99214 OFFICE O/P EST MOD 30 MIN: CPT | Performed by: FAMILY MEDICINE

## 2019-11-20 PROCEDURE — G0008 ADMIN INFLUENZA VIRUS VAC: HCPCS | Performed by: FAMILY MEDICINE

## 2019-11-20 RX ORDER — METOPROLOL SUCCINATE 50 MG/1
50 TABLET, EXTENDED RELEASE ORAL DAILY
Qty: 90 TABLET | Refills: 1 | Status: SHIPPED | OUTPATIENT
Start: 2019-11-20 | End: 2020-06-10 | Stop reason: SDUPTHER

## 2019-11-20 RX ORDER — ATORVASTATIN CALCIUM 10 MG/1
10 TABLET, FILM COATED ORAL DAILY
Qty: 90 TABLET | Refills: 1 | Status: SHIPPED | OUTPATIENT
Start: 2019-11-20 | End: 2020-06-10 | Stop reason: SDUPTHER

## 2019-11-20 ASSESSMENT — ENCOUNTER SYMPTOMS
BACK PAIN: 1
SHORTNESS OF BREATH: 0

## 2020-01-15 ENCOUNTER — HOSPITAL ENCOUNTER (OUTPATIENT)
Age: 80
Discharge: HOME | End: 2020-01-15
Payer: MEDICARE

## 2020-01-15 LAB
ALBUMIN SERPL-MCNC: 4.1 GM/DL (ref 3.4–5)
ALP BLD-CCNC: 67 IU/L (ref 40–128)
ALT SERPL-CCNC: 18 U/L (ref 10–40)
ANION GAP SERPL CALCULATED.3IONS-SCNC: 11 MMOL/L (ref 4–16)
AST SERPL-CCNC: 19 IU/L (ref 15–37)
BILIRUB SERPL-MCNC: 0.6 MG/DL (ref 0–1)
BUN BLDV-MCNC: 20 MG/DL (ref 6–23)
CALCIUM SERPL-MCNC: 8.7 MG/DL (ref 8.3–10.6)
CHLORIDE BLD-SCNC: 102 MMOL/L (ref 99–110)
CHOLESTEROL, FASTING: 163 MG/DL
CO2: 28 MMOL/L (ref 21–32)
CREAT SERPL-MCNC: 0.8 MG/DL (ref 0.6–1.1)
GFR AFRICAN AMERICAN: >60 ML/MIN/1.73M2
GFR NON-AFRICAN AMERICAN: >60 ML/MIN/1.73M2
GLUCOSE BLD-MCNC: 81 MG/DL (ref 70–99)
HDLC SERPL-MCNC: 54 MG/DL
LDL CHOLESTEROL DIRECT: 98 MG/DL
POTASSIUM SERPL-SCNC: 4.1 MMOL/L (ref 3.5–5.1)
SODIUM BLD-SCNC: 141 MMOL/L (ref 135–145)
TOTAL PROTEIN: 6.4 GM/DL (ref 6.4–8.2)
TRIGLYCERIDE, FASTING: 137 MG/DL

## 2020-01-15 PROCEDURE — 80061 LIPID PANEL: CPT

## 2020-01-15 PROCEDURE — 80053 COMPREHEN METABOLIC PANEL: CPT

## 2020-01-15 PROCEDURE — 36415 COLL VENOUS BLD VENIPUNCTURE: CPT

## 2020-03-25 ENCOUNTER — TELEPHONE (OUTPATIENT)
Dept: FAMILY MEDICINE CLINIC | Age: 80
End: 2020-03-25

## 2020-03-25 NOTE — TELEPHONE ENCOUNTER
Called patient to offer My Chart, she refused states she does not own a computer or have an email address.

## 2020-04-30 RX ORDER — ATORVASTATIN CALCIUM 10 MG/1
TABLET, FILM COATED ORAL
Qty: 90 TABLET | Refills: 3 | OUTPATIENT
Start: 2020-04-30

## 2020-06-10 ENCOUNTER — VIRTUAL VISIT (OUTPATIENT)
Dept: FAMILY MEDICINE CLINIC | Age: 80
End: 2020-06-10
Payer: MEDICARE

## 2020-06-10 PROCEDURE — 99397 PER PM REEVAL EST PAT 65+ YR: CPT | Performed by: FAMILY MEDICINE

## 2020-06-10 RX ORDER — GABAPENTIN 100 MG/1
100 CAPSULE ORAL 2 TIMES DAILY
Qty: 180 CAPSULE | Refills: 0 | Status: SHIPPED | OUTPATIENT
Start: 2020-06-10 | End: 2021-03-24 | Stop reason: ALTCHOICE

## 2020-06-10 RX ORDER — ATORVASTATIN CALCIUM 10 MG/1
10 TABLET, FILM COATED ORAL DAILY
Qty: 90 TABLET | Refills: 0 | Status: SHIPPED | OUTPATIENT
Start: 2020-06-10 | End: 2020-09-23 | Stop reason: SDUPTHER

## 2020-06-10 RX ORDER — METOPROLOL SUCCINATE 50 MG/1
50 TABLET, EXTENDED RELEASE ORAL DAILY
Qty: 90 TABLET | Refills: 0 | Status: SHIPPED | OUTPATIENT
Start: 2020-06-10 | End: 2020-09-23 | Stop reason: SDUPTHER

## 2020-06-10 NOTE — PROGRESS NOTES
Suraj Cohen is a [de-identified] y.o. female evaluated via telephone on 6/10/2020. Consent:  She and/or health care decision maker is aware that that she may receive a bill for this telephone service, depending on her insurance coverage, and has provided verbal consent to proceed: Yes      Documentation:  I communicated with the patient and/or health care decision maker about     Hypertension: She is taking her metoprolol as directed. Denies any chest pain or chest pressure or chest tightness. Leg cramps: Is wondering if taking the gabapentin twice a day instead of once a day would help. She is not on a diuretic. She wakes up in the morning and stretches her legs and gets leg cramps    History of A. fib: Continues to take Xarelto through her cardiologist      Details of this discussion including any medical advice provided:     I am not sure if the blood pressure reading was correct as patient normally has lower blood pressure here in the office. I affirm this is a Patient Initiated Episode with a Patient who has not had a related appointment within my department in the past 7 days or scheduled within the next 24 hours. Diagnosis Orders   1. Leg cramps     2. Chronic left-sided low back pain with left-sided sciatica  gabapentin (NEURONTIN) 100 MG capsule   3. Essential hypertension  metoprolol succinate (TOPROL XL) 50 MG extended release tablet   4. SVT (supraventricular tachycardia) (HCC)  metoprolol succinate (TOPROL XL) 50 MG extended release tablet   5. Essential tremor  metoprolol succinate (TOPROL XL) 50 MG extended release tablet   Recheck in 3 months for Medicare check. Patient is not on a diuretic and so I do not think she is having leg cramps because of low potassium. I recommended that she do leg stretches and drink plenty water before going to bed.       Patient identification was verified at the start of the visit: Yes    Total Time: minutes: 11-20 minutes    Note: not billable if this call serves to triage the patient into an appointment for the relevant concern      Arabella Blandon

## 2020-09-08 RX ORDER — ATORVASTATIN CALCIUM 10 MG/1
TABLET, FILM COATED ORAL
Qty: 90 TABLET | Refills: 3 | OUTPATIENT
Start: 2020-09-08

## 2020-09-23 ENCOUNTER — OFFICE VISIT (OUTPATIENT)
Dept: FAMILY MEDICINE CLINIC | Age: 80
End: 2020-09-23
Payer: MEDICARE

## 2020-09-23 VITALS
DIASTOLIC BLOOD PRESSURE: 66 MMHG | BODY MASS INDEX: 26.57 KG/M2 | SYSTOLIC BLOOD PRESSURE: 122 MMHG | HEIGHT: 58 IN | WEIGHT: 126.6 LBS | HEART RATE: 72 BPM | OXYGEN SATURATION: 97 % | TEMPERATURE: 97.3 F

## 2020-09-23 VITALS
WEIGHT: 126 LBS | HEART RATE: 72 BPM | SYSTOLIC BLOOD PRESSURE: 122 MMHG | TEMPERATURE: 97.3 F | HEIGHT: 58 IN | OXYGEN SATURATION: 97 % | DIASTOLIC BLOOD PRESSURE: 66 MMHG | BODY MASS INDEX: 26.45 KG/M2

## 2020-09-23 LAB
BILIRUBIN, POC: NORMAL
BLOOD URINE, POC: NORMAL
CLARITY, POC: NORMAL
COLOR, POC: YELLOW
GLUCOSE URINE, POC: NORMAL
KETONES, POC: NORMAL
LEUKOCYTE EST, POC: NORMAL
NITRITE, POC: NORMAL
PH, POC: 6
PROTEIN, POC: NORMAL
SPECIFIC GRAVITY, POC: >=1.03
UROBILINOGEN, POC: 0.2

## 2020-09-23 PROCEDURE — 3288F FALL RISK ASSESSMENT DOCD: CPT | Performed by: FAMILY MEDICINE

## 2020-09-23 PROCEDURE — G0438 PPPS, INITIAL VISIT: HCPCS | Performed by: FAMILY MEDICINE

## 2020-09-23 PROCEDURE — 81002 URINALYSIS NONAUTO W/O SCOPE: CPT | Performed by: FAMILY MEDICINE

## 2020-09-23 PROCEDURE — 99214 OFFICE O/P EST MOD 30 MIN: CPT | Performed by: FAMILY MEDICINE

## 2020-09-23 PROCEDURE — G8510 SCR DEP NEG, NO PLAN REQD: HCPCS | Performed by: FAMILY MEDICINE

## 2020-09-23 RX ORDER — ATORVASTATIN CALCIUM 10 MG/1
10 TABLET, FILM COATED ORAL DAILY
Qty: 90 TABLET | Refills: 1 | Status: SHIPPED | OUTPATIENT
Start: 2020-09-23 | End: 2021-03-24 | Stop reason: SDUPTHER

## 2020-09-23 RX ORDER — METOPROLOL SUCCINATE 50 MG/1
50 TABLET, EXTENDED RELEASE ORAL DAILY
Qty: 90 TABLET | Refills: 1 | Status: SHIPPED | OUTPATIENT
Start: 2020-09-23 | End: 2021-03-24 | Stop reason: SDUPTHER

## 2020-09-23 ASSESSMENT — PATIENT HEALTH QUESTIONNAIRE - PHQ9
SUM OF ALL RESPONSES TO PHQ9 QUESTIONS 1 & 2: 0
SUM OF ALL RESPONSES TO PHQ QUESTIONS 1-9: 0
1. LITTLE INTEREST OR PLEASURE IN DOING THINGS: 0
SUM OF ALL RESPONSES TO PHQ QUESTIONS 1-9: 0
1. LITTLE INTEREST OR PLEASURE IN DOING THINGS: 0
SUM OF ALL RESPONSES TO PHQ QUESTIONS 1-9: 0
SUM OF ALL RESPONSES TO PHQ QUESTIONS 1-9: 0
2. FEELING DOWN, DEPRESSED OR HOPELESS: 0
2. FEELING DOWN, DEPRESSED OR HOPELESS: 0
SUM OF ALL RESPONSES TO PHQ9 QUESTIONS 1 & 2: 0

## 2020-09-23 ASSESSMENT — ENCOUNTER SYMPTOMS
EYE REDNESS: 0
SHORTNESS OF BREATH: 0
SORE THROAT: 0
VOMITING: 0
DIARRHEA: 0
BACK PAIN: 1
COUGH: 0
NAUSEA: 0

## 2020-09-23 ASSESSMENT — LIFESTYLE VARIABLES: HOW OFTEN DO YOU HAVE A DRINK CONTAINING ALCOHOL: 0

## 2020-09-23 NOTE — PATIENT INSTRUCTIONS
having a heart attack. You want this level to be high (ideally greater than 60). It is a risk to have a level less than 40. You can raise this good cholesterol by eating olive oil, canola oil, avocados, or nuts. Exercise raises this level, too. Fat    Fat is calorie dense and packs a lot of calories into a small amount of food. Even though fats should be limited due to their high calorie content, not all fats are bad. In fact, some fats are quite healthful. Fat can be broken down into four main types. The good-for-you fats are:   Monounsaturated fat  found in oils such as olive and canola, avocados, and nuts and natural nut butters; can decrease cholesterol levels, while keeping levels of HDL cholesterol high   Polyunsaturated fat  found in oils such as safflower, sunflower, soybean, corn, and sesame; can decrease total cholesterol and LDL cholesterol   Omega-3 fatty acids  particularly those found in fatty fish (such as salmon, trout, tuna, mackerel, herring, and sardines); can decrease risk of arrhythmias, decrease triglyceride levels, and slightly lower blood pressure   The fats that you want to limit are:   Saturated fat  found in animal products, many fast foods, and a few vegetables; increases total blood cholesterol, including LDL levels   Animal fats that are saturated include: butter, lard, whole-milk dairy products, meat fat, and poultry skin   Vegetable fats that are saturated include: hydrogenated shortening, palm oil, coconut oil, cocoa butter   Hydrogenated or trans fat  found in margarine and vegetable shortening, most shelf stable snack foods, and fried foods; increases LDL and decreases HDL     It is generally recommended that you limit your total fat for the day to less than 30% of your total calories. If you follow an 1800-calorie heart healthy diet, for example, this would mean 60 grams of fat or less per day.    Saturated fat and trans fat in your diet raises your blood cholesterol the most, much more than dietary cholesterol does. For this reason, on a heart-healthy diet, less than 7% of your calories should come from saturated fat and ideally 0% from trans fat. On an 1800-calorie diet, this translates into less than 14 grams of saturated fat per day, leaving 46 grams of fat to come from mono- and polyunsaturated fats.    Food Choices on a Heart Healthy Diet   Food Category   Foods Recommended   Foods to Avoid   Grains   Breads and rolls without salted tops Most dry and cooked cereals Unsalted crackers and breadsticks Low-sodium or homemade breadcrumbs or stuffing All rice and pastas   Breads, rolls, and crackers with salted tops High-fat baked goods (eg, muffins, donuts, pastries) Quick breads, self-rising flour, and biscuit mixes Regular bread crumbs Instant hot cereals Commercially prepared rice, pasta, or stuffing mixes   Vegetables   Most fresh, frozen, and low-sodium canned vegetables Low-sodium and salt-free vegetable juices Canned vegetables if unsalted or rinsed   Regular canned vegetables and juices, including sauerkraut and pickled vegetables Frozen vegetables with sauces Commercially prepared potato and vegetable mixes   Fruits   Most fresh, frozen, and canned fruits All fruit juices   Fruits processed with salt or sodium   Milk   Nonfat or low-fat (1%) milk Nonfat or low-fat yogurt Cottage cheese, low-fat ricotta, cheeses labeled as low-fat and low-sodium   Whole milk Reduced-fat (2%) milk Malted and chocolate milk Full fat yogurt Most cheeses (unless low-fat and low salt) Buttermilk (no more than 1 cup per week)   Meats and Beans   Lean cuts of fresh or frozen beef, veal, lamb, or pork (look for the word loin) Fresh or frozen poultry without the skin Fresh or frozen fish and some shellfish Egg whites and egg substitutes (Limit whole eggs to three per week) Tofu Nuts or seeds (unsalted, dry-roasted), low-sodium peanut butter Dried peas, beans, and lentils   Any smoked, cured, salted, or canned meat, fish, or poultry (including dominguez, chipped beef, cold cuts, hot dogs, sausages, sardines, and anchovies) Poultry skins Breaded and/or fried fish or meats Canned peas, beans, and lentils Salted nuts   Fats and Oils   Olive oil and canola oil Low-sodium, low-fat salad dressings and mayonnaise   Butter, margarine, coconut and palm oils, dominguez fat   Snacks, Sweets, and Condiments   Low-sodium or unsalted versions of broths, soups, soy sauce, and condiments Pepper, herbs, and spices; vinegar, lemon, or lime juice Low-fat frozen desserts (yogurt, sherbet, fruit bars) Sugar, cocoa powder, honey, syrup, jam, and preserves Low-fat, trans-fat free cookies, cakes, and pies Christopher and animal crackers, fig bars, sahara snaps   High-fat desserts Broth, soups, gravies, and sauces, made from instant mixes or other high-sodium ingredients Salted snack foods Canned olives Meat tenderizers, seasoning salt, and most flavored vinegars   Beverages   Low-sodium carbonated beverages Tea and coffee in moderation Soy milk   Commercially softened water   Suggestions   Make whole grains, fruits, and vegetables the base of your diet. Choose heart-healthy fats such as canola, olive, and flaxseed oil, and foods high in heart-healthy fats, such as nuts, seeds, soybeans, tofu, and fish. Eat fish at least twice per week; the fish highest in omega-3 fatty acids and lowest in mercury include salmon, herring, mackerel, sardines, and canned chunk light tuna. If you eat fish less than twice per week or have high triglycerides, talk to your doctor about taking fish oil supplements. Read food labels. For products low in fat and cholesterol, look for fat free, low-fat, cholesterol free, saturated fat free, and trans fat freeAlso scan the Nutrition Facts Label, which lists saturated fat, trans fat, and cholesterol amounts.    For products low in sodium, look for sodium free, very low sodium, low sodium, no added salt, and unsalted Skip the salt when cooking or at the table; if food needs more flavor, get creative and try out different herbs and spices. Garlic and onion also add substantial flavor to foods. Trim any visible fat off meat and poultry before cooking, and drain the fat off after guido. Use cooking methods that require little or no added fat, such as grilling, boiling, baking, poaching, broiling, roasting, steaming, stir-frying, and sauting. Avoid fast food and convenience food. They tend to be high in saturated and trans fat and have a lot of added salt. Talk to a registered dietitian for individualized diet advice. Last Reviewed: March 2011 Brandi Harris MS, MPH, RD   Updated: 3/29/2011       Keep Your Memory Nicci Lanes       Many factors can affect your ability to remembera hectic lifestyle, aging, stress, chronic disease, and certain medicines. But, there are steps you can take to sharpen your mind and help preserve your memory. Challenge Your Brain   Regularly challenging your mind may help keeps it in top shape. Good mental exercises include:   Crossword puzzlesUse a dictionary if you need it; you will learn more that way. Brainteasers Try some! Crafts, such as wood working and sewing   Hobbies, such as gardening and building model airplanes   SocializingVisit old friends or join groups to meet new ones. Reading   Learning a new language   Taking a class, whether it be art history or robert chi   TravelingExperience the food, history, and culture of your destination   Learning to use a computer   Going to museums, the theater, or thought-provoking movies   Changing things in your daily life, such as reversing your pattern in the grocery store or brushing your teeth using your nondominant hand   Use Memory Aids   There is no need to remember every detail on your own.  These memory aids can help:   Calendars and day planners   Electronic organizers to store all sorts of helpful informationThese devices can \"beep\" to remind you of appointments. A book of days to record birthdays, anniversaries, and other occasions that occur on the same date every year   Detailed \"to-do\" lists and strategically placed sticky notes   Quick \"study\" sessionsBefore a gathering, review who will be there so their names will be fresh in your mind. Establish routinesFor example, keep your keys, wallet, and umbrella in the same place all the time or take medicine with your 8:00 AM glass of juice   Live a Healthy Life   Many actions that will keep your body strong will do the same for your mind. For example:   Talk to Your Doctor About Herbs and Supplements    Malnutrition and vitamin deficiencies can impair your mental function. For example, vitamin B12 deficiency can cause a range of symptoms, including confusion. But, what if your nutritional needs are being met? Can herbs and supplements still offer a benefit? Researchers have investigated a range of natural remedies, such as ginkgo , ginseng , and the supplement phosphatidylserine (PS). So far, though, the evidence is inconsistent as to whether these products can improve memory or thinking. If you are interested in taking herbs and supplements, talk to your doctor first because they may interact with other medicines that you are taking. Exercise Regularly    Among the many benefits of regular exercise are increased blood flow to the brain and decreased risk of certain diseases that can interfere with memory function. One study found that even moderate exercise has a beneficial effect. Examples of \"moderate\" exercise include:   Playing 18 holes of golf once a week, without a cart   Playing tennis twice a week   Walking one mile per day   Manage Stress    It can be tough to remember what is important when your mind is cluttered. Make time for relaxation. Choose activities that calm you down, and make it routine.    Manage Chronic Conditions    Side effects of high blood pressure , diabetes, and heart disease can interfere with mental function. Many of the lifestyle steps discussed here can help manage these conditions. Strive to eat a healthy diet, exercise regularly, get stress under control, and follow your doctor's advice for your condition. Minimize Medications    Talk to your doctor about the medicines that you take. Some may be unnecessary. Also, healthy lifestyle habits may lower the need for certain drugs. Last Reviewed: April 2010 Tori Farmer MD   Updated: 4/13/2010       Keeping Home a [facility]       As we get older, changes in balance, gait, strength, vision, hearing, and cognition make even the most youthful senior more prone to accidents. Falls are one of the leading health risks for older people. This increased risk of falling is related to:   Aging process (eg, decreased muscle strength, slowed reflexes)   Higher incidence of chronic health problems (eg, arthritis, diabetes) that may limit mobility, agility or sensory awareness   Side effects of medicine (eg, dizziness, blurred vision)especially medicines like prescription pain medicines and drugs used to treat mental health conditions   Depending on the brittleness of your bones, the consequences of a fall can be serious and long lasting. Home Life   Research by the Association of Aging [facility]) shows that some home accidents among older adults can be prevented by making simple lifestyle changes and basic modifications and repairs to the home environment. Here are some lifestyle changes that experts recommend:   Have your hearing and vision checked regularly. Be sure to wear prescription glasses that are right for you. Speak to your doctor or pharmacist about the possible side effects of your medicines. A number of medicines can cause dizziness. If you have problems with sleep, talk to your doctor. Limit your intake of alcohol. If necessary, use a cane or walker to help maintain your balance.    Wear supportive, rubber-soled shoes, even at home. If you live in a region that gets wintry weather, you may want to put special cleats on your shoes to prevent you from slipping on the snow and ice. Exercise regularly to help maintain muscle tone, agility, and balance. Always hold the banister when going up or down stairs. Also, use  bars when getting in or out of the bath or shower, or using the toilet. To avoid dizziness, get up slowly from a lying down position. Sit up first, dangling your legs for a minute or two before rising to a standing position. Overall Home Safety Check   According to the Consumer Product Safety Commision's \"Older Consumer Home Safety Checklist,\" it is important to check for potential hazards in each room. And remember, proper lighting is an essential factor in home safety. If you cannot see clearly, you are more likely to fall. Important questions to ask yourself include:   Are lamp, electric, extension, and telephone cords placed out of the flow of traffic and maintained in good condition? Have frayed cords been replaced? Are all small rugs and runners slip resistant? If not, you can secure them to the floor with a special double-sided carpet tape. Are smoke detectors properly locatedone on every floor of your home and one outside of every sleeping area? Are they in good working order? Are batteries replaced at least once a year? Do you have a well-maintained carbon monoxide detector outside every sleeping are in your home? Does your furniture layout leave plenty of space to maneuver between and around chairs, tables, beds, and sofas? Are hallways, stairs and passages between rooms well lit? Can you reach a lamp without getting out of bed? Are floor surfaces well maintained? Shag rugs, high-pile carpeting, tile floors, and polished wood floors can be particularly slippery. Stairs should always have handrails and be carpeted or fitted with a non-skid tread.    Is your telephone easily reachable. Is the cord safely tucked away? Room by Room   According to the Association of Aging, bathrooms and eva are the two most potentially hazardous rooms in your home. In the Kitchen    Be sure your stove is in proper working order and always make sure burners and the oven are off before you go out or go to sleep. Keep pots on the back burners, turn handles away from the front of the stove, and keep stove clean and free of grease build-up. Kitchen ventilation systems and range exhausts should be working properly. Keep flammable objects such as towels and pot holders away from the cooking area except when in use. Make sure kitchen curtains are tied back. Move cords and appliances away from the sink and hot surfaces. If extension cords are needed, install wiring guides so they do not hang over the sink, range, or working areas. Look for coffee pots, kettles and toaster ovens with automatic shut-offs. Keep a mop handy in the kitchen so you can wipe up spills instantly. You should also have a small fire extinguisher. Arrange your kitchen with frequently used items on lower shelves to avoid the need to stand on a stepstool to reach them. Make sure countertops are well-lit to avoid injuries while cutting and preparing food. In the Bathroom    Use a non-slip mat or decals in the tub and shower, since wet, soapy tile or porcelain surfaces are extremely slippery. Make sure bathroom rugs are non-skid or tape them firmly to the floor. Bathtubs should have at least one, preferably two, grab bars, firmly attached to structural supports in the wall. (Do not use built-in soap holders or glass shower doors as grab bars.)    Tub seats fitted with non-slip material on the legs allow you to wash sitting down. For people with limited mobility, bathtub transfer benches allow you to slide safely into the tub.     Raised toilet seats and toilet safety rails are helpful for those with knee or hip problems. In the [place]    Make sure you use a nightlight and that the area around your bed is clear of potential obstacles. Be careful with electric blankets and never go to sleep with a heating pad, which can cause serious burns even if on a low setting. Use fire-resistant mattress covers and pillows, and NEVER smoke in bed. Keep a phone next to the bed that is programmed to dial 911 at the push of a button. If you have a chronic condition, you may want to sign on with an automatic call-in service. Typically the system includes a small pendant that connects directly to an emergency medical voice-response system. You should also make arrangements to stay in contact with someonefriend, neighbor, family memberon a regular schedule. Fire Prevention   According to the [employer]. (Smoke Alarms for Every) [address] [place] [address], senior citizens are one of the two highest risk groups for death and serious injuries due to residential fires. When cooking, wear short-sleeved items, never a bulky long-sleeved robe. The [facility]'s Safety Checklist for Older Consumers emphasizes the importance of checking basements, garages, workshops and storage areas for fire hazards, such as volatile liquids, piles of old rags or clothing and overloaded circuits. Never smoke in bed or when lying down on a couch or recliner chair. Small portable electric or kerosene heaters are responsible for many home fires and should be used cautiously if at all. If you do use one, be sure to keep them away from flammable materials. In case of fire, make sure you have a pre-established emergency exit plan. Have a professional check your fireplace and other fuel-burning appliances yearly. Helping Hands   Baby boomers entering the muarer years will continue to see the development of new products to help older adults live safely and independently in spite of age-related changes.   Making Life More Livable  , by Ibrahima Villegas, lists over 1,000 products for \"living well in the mature years,\" such as bathing and mobility aids, household security devices, ergonomically designed knives and peelers, and faucet valves and knobs for temperature control. Medical supply stores and organizations are good sources of information about products that improve your quality of life and insure your safety. Last Reviewed: November 2009 Gloria Armenta MD   Updated: 3/7/2011            Advance Care Planning: Care Instructions  Your Care Instructions     It can be hard to live with an illness that cannot be cured. But if your health is getting worse, you may want to make decisions about end-of-life care. Planning for the end of your life does not mean that you are giving up. It is a way to make sure that your wishes are met. Clearly stating your wishes can make it easier for your loved ones. Making plans while you are still able may also ease your mind and make your final days less stressful and more meaningful. Follow-up care is a key part of your treatment and safety. Be sure to make and go to all appointments, and call your doctor if you are having problems. It's also a good idea to know your test results and keep a list of the medicines you take. What can you do to plan for the end of life? You can bring these issues up with your doctor. You do not need to wait until your doctor starts the conversation. You might start with \"I would not be willing to live with . Dimitri Almodovar \" When you complete this sentence it helps your doctor understand your wishes. Talk openly and honestly with your doctor. This is the best way to understand the decisions you will need to make as your health changes. Know that you can always change your mind. Ask your doctor about commonly used life-support measures. These include tube feedings, breathing machines, and fluids given through a vein (IV).  Understanding these treatments will help you decide whether you . This form allows you to name a person to make decisions about your care if you are not able to. Most people ask a close friend or family member. Talk to this person about the kinds of treatments you want and those that you do not want. Make sure this person understands your wishes. A medical power of  may be called something else in your state. These legal papers are simple to change. Tell your doctor what you want to change, and ask him or her to make a note in your medical file. Give your family updated copies of the papers. Where can you learn more? Go to [email or web address] and sign in to your [employer] account. Enter P184 in the [employer] box to learn more about \"Advance Care Planning: Care Instructions. \"     If you do not have an account, please click on the \"Sign Up Now\" link. Current as of: December 9, 2019               Content Version: 12.5  © 6990-2750 [employer]. Care instructions adapted under license by [facility] ([facility]). If you have questions about a medical condition or this instruction, always ask your healthcare professional. [place] [address] any warranty or liability for your use of this information. Learning About Living Perroy  What is a living will? A living will, also called a declaration, is a legal form. It tells your family and your doctor your wishes when you can't speak for yourself. It's used by the health professionals who will treat you as you near the end of your life or if you get seriously hurt or ill. If you put your wishes in writing, your loved ones and others will know what kind of care you want. They won't need to guess. This can ease your mind and be helpful to others. And you can change or cancel your living will at any time. A living will is not the same as an estate or property will.  An estate will explains what you want to happen with your money and property after you die.  How do you use it? A living will is used to describe the kinds of treatment or life support you want as you near the end of your life or if you get seriously hurt or ill. Keep these facts in mind about living johnson. Your living will is used only if you can't speak or make decisions for yourself. Most often, one or more doctors must certify that you can't speak or decide for yourself before your living will takes effect. If you get better and can speak for yourself again, you can accept or refuse any treatment. It doesn't matter what you said in your living will. Some states may limit your right to refuse treatment in certain cases. For example, you may need to clearly state in your living will that you don't want artificial hydration and nutrition, such as being fed through a tube. Is a living will a legal document? A living will is a legal document. Each state has its own laws about living johnson. And a living will may be called something else in your state. Here are some things to know about living johnson. You don't need an  to complete a living will. But legal advice can be helpful if your state's laws are unclear. It can also help if your health history is complicated or your family can't agree on what should be in your living will. You can change your living will at any time. Some people find that their wishes about end-of-life care change as their health changes. If you make big changes to your living will, complete a new form. If you move to another state, make sure that your living will is legal in the state where you now live. In most cases, doctors will respect your wishes even if you have a form from a different state. You might use a universal form that has been approved by many states. This kind of form can sometimes be filled out and stored online. Your digital copy will then be available wherever you have a connection to the internet.  The doctors and nurses who need to treat you

## 2020-09-23 NOTE — PATIENT INSTRUCTIONS
October Fifth is the DEADLINE for voter registration for the November Third GENERAL ELECTION. Early voting starts October sixth. Don't forget to vote!!!        [address] TO ALL APPOINTMENTS    The diagnoses and medications listed in this after visit summary may not be accurate at the time of check out. Please check MY CHART in 28-48 hours for possible corrections. Late cancellation policy: So that we can better accommodate people who are sick, please give our office 24 hour notice for an appointment cancellation. Thank you. Missed appointments: Your care is very important to us. It is important that you keep your scheduled appointments. Multiple missed appointments will lead to a dismissal from the office. Later arrival policy: If you are more than 10 minutes late for your appointment, you will be asked to reschedule. Please allow 5-7 business days for paperwork to be processed. It is important that you check your MY Chart messages, as they include appointment reminders, test results, and other important information. If you have forgotten your password, please call [phone]. Recombinant Zoster (Shingles) Vaccine, RZV: What you need to know  Why get vaccinated? Shingles (also called herpes zoster, or just zoster) is a painful skin rash, often with blisters. Shingles is caused by the varicella zoster virus, the same virus that causes chickenpox. After you have chickenpox, the virus stays in your body and can cause shingles later in life. You can't catch shingles from another person. However, a person who has never had chickenpox (or chickenpox vaccine) could get chickenpox from someone with shingles. A shingles rash usually appears on one side of the face or body and heals within 2 to 4 weeks. Its main symptom is pain, which can be severe. Other symptoms can include fever, headache, chills, and upset stomach.  Very rarely, a shingles infection can lead to pneumonia, hearing problems, blindness, brain inflammation (encephalitis), or death. For about 1 person in 5, severe pain can continue even long after the rash has cleared up. This long-lasting pain is called post-herpetic neuralgia (PHN). Shingles is far more common in people 48years of age and older than in younger people, and the risk increases with age. It is also more common in people whose immune system is weakened because of a disease such as cancer, or by drugs such as steroids or chemotherapy. At least 1 million people a year in the [facility] get shingles. Shingles vaccine (recombinant)  Recombinant shingles vaccine was approved by FDA in 2017 for the prevention of shingles. In clinical trials, it was more than 90% effective in preventing shingles. It can also reduce the likelihood of PHN. Two doses, 2 to 6 months apart, are recommended for adults 48 and older. This vaccine is also recommended for people who have already gotten the live shingles vaccine (Zostavax). There is no live virus in this vaccine. Some people should not get this vaccine  Tell your vaccine provider if you:  · Have any severe, life-threatening allergies. A person who has ever had a life-threatening allergic reaction after a dose of recombinant shingles vaccine, or has a severe allergy to any component of this vaccine, may be advised not to be vaccinated. Ask your health care provider if you want information about vaccine components. · Are pregnant or breastfeeding. There is not much information about use of recombinant shingles vaccine in pregnant or nursing women. Your healthcare provider might recommend delaying vaccination. · Are not feeling well. If you have a mild illness, such as a cold, you can probably get the vaccine today. If you are moderately or severely ill, you should probably wait until you recover. Your doctor can advise you.   Risks of a vaccine reaction  With any medicine, including vaccines, there is a chance of reactions. After recombinant shingles vaccination, a person might experience:  · Pain, redness, soreness, or swelling at the site of the injection  · Headache, muscle aches, fever, shivering, fatigue  In clinical trials, most people got a sore arm with mild or moderate pain after vaccination, and some also had redness and swelling where they got the shot. Some people felt tired, had muscle pain, a headache, shivering, fever, stomach pain, or nausea. About 1 out of 6 people who got recombinant zoster vaccine experienced side effects that prevented them from doing regular activities. Symptoms went away on their own in about 2 to 3 days. Side effects were more common in younger people. You should still get the second dose of recombinant zoster vaccine even if you had one of these reactions after the first dose. Other things that could happen after this vaccine:  · People sometimes faint after medical procedures, including vaccination. Sitting or lying down for about 15 minutes can help prevent fainting and injuries caused by a fall. Tell your provider if you feel dizzy or have vision changes or ringing in the ears. · Some people get shoulder pain that can be more severe and longer-lasting than routine soreness that can follow injections. This happens very rarely. · Any medication can cause a severe allergic reaction. Such reactions to a vaccine are estimated at about 1 in a million doses, and would happen within a few minutes to a few hours after the vaccination. As with any medicine, there is a very remote chance of a vaccine causing a serious injury or death. The safety of vaccines is always being monitored. For more information, visit: www.cdc.gov/vaccinesafety/  What if there is a serious problem? What should I look for? · Look for anything that concerns you, such as signs of a severe allergic reaction, very high fever, or unusual behavior.   Signs of a severe allergic reaction can include hives, swelling of the face and throat, difficulty breathing, a fast heartbeat, dizziness, and weakness. These would usually start a few minutes to a few hours after the vaccination. What should I do? · If you think it is a severe allergic reaction or other emergency that can't wait, call 9-1-1 or get to the nearest hospital. Otherwise, call your health care provider. · Afterward, the reaction should be reported to the Vaccine Adverse Event Reporting System (VAERS). Your doctor should file this report, or you can do it yourself through the VAERS website at www.vaers. [facility].gov, or by calling 1-316.109.2048. VAERS does not give medical advice. .  How can I learn more? · Ask your health care provider. He or she can give you the vaccine package insert or suggest other sources of information. · Call your local or state health department. · Contact the Centers for Disease Control and Prevention (CDC):  ? Call 3-610.794.2940 (1-800-CDC-INFO) or  ? Visit CDC's website at www.cdc.gov/vaccines  Vaccine Information Statement (Interim)  Recombinant Zoster Vaccine  2/12/2018  Formerly [facility], [facility] and [facility] for Disease Control and Prevention  Many Vaccine Information Statements are available in [place]ish and other languages. See www.immunize.org/vis. Hojas de Información Sobre Vacunas están disponibles en Español y en muchos otros idiomas. Visite Rosalina.si  Care instructions adapted under license by [facility] ([facility]). If you have questions about a medical condition or this instruction, always ask your healthcare professional. [place] [address] any warranty or liability for your use of this information.

## 2020-09-23 NOTE — PROGRESS NOTES
Patient ID: Jonas Bowles 1940    . Chief Complaint   Patient presents with    Hypertension         Hypertension   This is a chronic problem. The current episode started more than 1 year ago. The problem is unchanged. The problem is controlled. Pertinent negatives include no chest pain, headaches, palpitations or shortness of breath. Risk factors for coronary artery disease include post-menopausal state. Past treatments include beta blockers and diuretics. Back Pain   This is a recurrent problem. The current episode started more than 1 year ago. The problem occurs daily. The problem has been waxing and waning since onset. The pain is present in the lumbar spine. The pain is mild. The symptoms are aggravated by bending. Pertinent negatives include no chest pain, fever, headaches or weakness. Risk factors include menopause. Treatments tried: Gabapentin has been reduced down from twice per day to once per day. The treatment provided significant relief. UTI symptoms: She thinks her UTI may resolve. Yesterday was having some symptoms. Burning with urination. Treated it with cranberry juice. Review of Systems   Constitutional: Negative for chills, fatigue and fever. HENT: Negative for sore throat. No loss of taste or smell sensation   Eyes: Negative for redness. Respiratory: Negative for cough (no unusual) and shortness of breath. Cardiovascular: Negative for chest pain and palpitations. Gastrointestinal: Negative for diarrhea, nausea and vomiting. Musculoskeletal: Positive for back pain. Negative for arthralgias and myalgias. Skin: Negative for rash. Neurological: Negative for weakness and headaches. Hematological: Does not bruise/bleed easily.        Patient Active Problem List   Diagnosis    Essential hypertension    Osteoporosis    Hx of colonic polyps    Degenerative disc disease, lumbar    Spondylolisthesis of lumbar region    Osteoarthritis of cervical spine    SVT (supraventricular tachycardia) (HCC)    Chronic left-sided low back pain with left-sided sciatica    Nonrheumatic aortic valve insufficiency    Mitral regurgitation    History of compression fracture of spine    Age-related osteoporosis without current pathological fracture    Sepsis due to pneumonia ([address]    Intractable nausea and vomiting    Anticoagulated    Paroxysmal atrial fibrillation ([facility] [address]       Past Surgical History:   Procedure Laterality Date    CHOLECYSTECTOMY  age 29's    open    COLONOSCOPY  2010    DILATION AND CURETTAGE OF UTERUS  age31's    FINGER TRIGGER RELEASE Right 2014    HERNIA REPAIR  2011    right ing hernia    HYSTERECTOMY  age 29's    \"took both ovaries\"    TONSILLECTOMY  age 22    VERTEBROPLASTY  8/24/15       Family History   Problem Relation Age of Onset    Heart Attack Mother         Myocardial Infarction- 76    Heart Disease Mother         MI    Diabetes Mother     High Blood Pressure Mother     Diabetes Sister         Type 2    Other Brother         AAA    Heart Disease Brother         MI    Heart Disease Father         MI    Diabetes Sister     Heart Disease Brother         MI       Current Outpatient Medications on File Prior to Visit   Medication Sig Dispense Refill    apixaban (ELIQUIS) 5 MG TABS tablet Take 10 mg by mouth daily      gabapentin (NEURONTIN) 100 MG capsule Take 1 capsule by mouth 2 times daily for 180 days. Intended supply: 90 days 180 capsule 0    Chlorpheniramine Maleate (CHLOR-TRIMETON ALLERGY PO) Take 3 tablets by mouth daily      Ascorbic Acid (VITAMIN C) 500 MG tablet Take 1,000 mg by mouth daily      calcium-vitamin D (OSCAL-500) 500-200 MG-UNIT per tablet Take 1 tablet by mouth daily.  XARELTO 20 MG TABS tablet Take 1 tablet by mouth daily  1     No current facility-administered medications on file prior to visit.                     Objective:         Physical Exam  Vitals signs and nursing note 4.1     Chloride 01/15/2020 102     CO2 01/15/2020 28     BUN 01/15/2020 20     CREATININE 01/15/2020 0.8     Glucose 01/15/2020 81     Calcium 01/15/2020 8.7     Alb 01/15/2020 4.1     Total Protein 01/15/2020 6.4     Total Bilirubin 01/15/2020 0.6     ALT 01/15/2020 18     AST 01/15/2020 19     Alkaline Phosphatase 01/15/2020 67     GFR Non- 01/15/2020 >60     GFR  01/15/2020 >60     Anion Gap 01/15/2020 11     Triglyceride, Fasting 01/15/2020 137     Cholesterol, Fasting 01/15/2020 163     HDL 01/15/2020 54     LDL Direct 01/15/2020 98            Assessment:       Diagnosis Orders   1. UTI symptoms  POCT Urinalysis no Micro    Culture, Urine   2. Essential hypertension  metoprolol succinate (TOPROL XL) 50 MG extended release tablet   3. SVT (supraventricular tachycardia) (HCC)  metoprolol succinate (TOPROL XL) 50 MG extended release tablet   4. Essential tremor  metoprolol succinate (TOPROL XL) 50 MG extended release tablet   5. Needs flu shot             Plan:      Urine looks normal.  Will culture    Recommended to try to wean off of gabapentin. Please try to take it every other day or less. No refill given today. She has a stockpile of the gabapentin right now. Hypertension stable. Continue current medication    Reminded her that it's better to get there flu shot now rather than in December--still declined today's flu shot      Return in about 6 months (around 3/23/2021) for HTN, Telephone visit, Check BP prior, Back pain.

## 2020-09-23 NOTE — PROGRESS NOTES
Medicare Annual Wellness Visit  Name: Tabatha Crandall Date: 2020   MRN: [id number] Sex: Female   Age: [de-identified] y.o. Ethnicity: Non-/Non    : 1940 Race: Supa Lo is here for Medicare AWV    Screenings for behavioral, psychosocial and functional/safety risks, and cognitive dysfunction are all negative except as indicated below. These results, as well as other patient data from the [address] form, are documented in Flowsheets linked to this Encounter. No Known Allergies    Prior to Visit Medications    Medication Sig Taking? Authorizing Provider   apixaban (ELIQUIS) 5 MG TABS tablet Take 10 mg by mouth daily Yes Emperatriz Martin MD   metoprolol succinate (TOPROL XL) 50 MG extended release tablet Take 1 tablet by mouth daily Yes Aleshia Carter MD   atorvastatin (LIPITOR) 10 MG tablet Take 1 tablet by mouth daily Yes Aleshia Carter MD   gabapentin (NEURONTIN) 100 MG capsule Take 1 capsule by mouth 2 times daily for 180 days. Intended supply: 90 days Yes Aleshia Carter MD   Chlorpheniramine Maleate (CHLOR-TRIMETON ALLERGY PO) Take 3 tablets by mouth daily Yes Historical Provider, MD   XARELTO 20 MG TABS tablet Take 1 tablet by mouth daily Yes Emperatriz Martin MD   Ascorbic Acid (VITAMIN C) 500 MG tablet Take 1,000 mg by mouth daily Yes Historical Provider, MD   calcium-vitamin D (OSCAL-500) 500-200 MG-UNIT per tablet Take 1 tablet by mouth daily.  Yes Historical Provider, MD       Past Medical History:   Diagnosis Date    Compression fracture of lumbar spine, non-traumatic ([address] 2013    Compression fx, thoracic spine ([facility] [address]     T6 - scheduled for vertebroplasty 2015    Degenerative disc disease, lumbar 2013    Environmental allergies     Hx of colonic polyps 2006    Hypertension     Osteoarthritis of lumbar spine     Osteoporosis     PONV (postoperative nausea and vomiting)     \"just with the hernia surgery\"    Spondylolisthesis of lumbar region 2013    Surgical menopause     TIA (transient ischemic attack) 2006    Unspecified cerebral artery occlusion with cerebral infarction     \"had light stroke 6 yrs ago-everything went black and thought I was gone but only lasted about 10 minuts\" no residual now       Past Surgical History:   Procedure Laterality Date    CHOLECYSTECTOMY  age 29's    open    COLONOSCOPY  2010    DILATION AND CURETTAGE OF UTERUS  age29's    FINGER TRIGGER RELEASE Right 2014    HERNIA REPAIR  2011    right ing hernia    HYSTERECTOMY  age 29's    \"took both ovaries\"   [facility] TONSILLECTOMY  age 22    VERTEBROPLASTY  8/24/15       Family History   Problem Relation Age of Onset    Heart Attack Mother         Myocardial Infarction- 76    Heart Disease Mother         MI    Diabetes Mother     High Blood Pressure Mother     Diabetes Sister         Type 2    Other Brother         AAA    Heart Disease Brother         MI    Heart Disease Father         MI    Diabetes Sister     Heart Disease Brother         MI       CareTeam (Including outside providers/suppliers regularly involved in providing care):   Patient Care Team:  Harrison Zhang MD as PCP - Anastasiia Langley MD as PCP - [facility] Empaneled Provider  Stormy Boykin MD as Consulting Physician (Orthopedic Surgery)    Wt Readings from Last 3 Encounters:   20 126 lb (57.2 kg)   20 126 lb 9.6 oz (57.4 kg)   19 126 lb 12.8 oz (57.5 kg)     Vitals:    20 1129   BP: 122/66   Pulse: 72   Temp: 97.3 °F (36.3 °C)   SpO2: 97%   Weight: 126 lb (57.2 kg)   Height: 4' 10\" (1.473 m)     Body mass index is 26.33 kg/m². Based upon direct observation of the patient, evaluation of cognition reveals recent and remote memory intact. Patient's complete Health Risk Assessment and screening values have been reviewed and are found in Flowsheets.  The following problems were reviewed today and where indicated follow up appointments were made and/or referrals ordered. Positive Risk Factor Screenings with Interventions:     General Health:  General  In general, how would you say your health is?: Very Good  In the past 7 days, have you experienced any of the following?  New or Increased Pain, New or Increased Fatigue, Loneliness, Social Isolation, Stress or Anger?: None of These  Do you get the social and emotional support that you need?: Yes  Do you have a Living Will?: (!) No  General Health Risk Interventions:  · No Living Will: provided the state-specific advance directive document to the patient    Hearing/Vision:  No exam data present  Hearing/Vision  Do you or your family notice any trouble with your hearing?: No  Do you have difficulty driving, watching TV, or doing any of your daily activities because of your eyesight?: No  Have you had an eye exam within the past year?: (!) No  Hearing/Vision Interventions:  · Vision concerns:  patient encouraged to make appointment with his/her eye specialist    Safety:  Safety  Do you have working smoke detectors?: Yes  Have all throw rugs been removed or fastened?: (!) No  Do you have non-slip mats or surfaces in all bathtubs/showers?: (!) No  Do all of your stairways have a railing or banister?: Yes  Are your doorways, halls and stairs free of clutter?: Yes  Do you always fasten your seatbelt when you are in a car?: (!) No  Safety Interventions:  · Home safety tips provided    Personalized Preventive Plan   Current Health Maintenance Status  Immunization History   Administered Date(s) Administered    Influenza 09/19/2012    Influenza Virus Vaccine 09/01/2010, 09/01/2011    Influenza, High Dose (Fluzone 65 yrs and older) 10/29/2015, 09/28/2016, 09/23/2017, 12/05/2018    Influenza, Triv, inactivated, subunit, adjuvanted, IM (Fluad 65 yrs and older) 11/20/2019    Pneumococcal Conjugate 13-valent (Uthayqh30) 10/29/2015    Pneumococcal Polysaccharide (Byfloaqpq92) 11/12/2008    Tdap (Boostrix, Adacel) 2008, 2018    Zoster Live (Zostavax) 2010        Health Maintenance   Topic Date Due    Shingles Vaccine (2 of 3) 2010    Annual Wellness Visit (AWV)  2019    Flu vaccine (1) 2020    Lipid screen  01/15/2021    Potassium monitoring  01/15/2021    Creatinine monitoring  01/15/2021    Statin Therapy  06/10/2021    DTaP/Tdap/Td vaccine (3 - Td) 2028    DEXA (modify frequency per FRAX score)  Completed    Pneumococcal 65+ years Vaccine  Completed    Hepatitis A vaccine  Aged Out    Hepatitis B vaccine  Aged Out    Hib vaccine  Aged Out    Meningococcal (ACWY) vaccine  Aged Out     Recommendations for [employer] Due: see orders and patient instructions/AVS.  . Recommended screening schedule for the next 5-10 years is provided to the patient in written form: see Patient Instructions/AVS.    April LOVE, 2020, performed the documented evaluation under the direct supervision of the attending physician. Medicare Annual Wellness Visit  Name: Leroy López Date: 2020   MRN: [id number] Sex: Female   Age: [de-identified] y.o. Ethnicity: Non-/Non    : 1940 Race: Inez Small is here for Medicare AWV    Screenings for behavioral, psychosocial and functional/safety risks, and cognitive dysfunction are all negative except as indicated below. These results, as well as other patient data from the [facility] [address] [facility] form, are documented in Flowsheets linked to this Encounter. No Known Allergies    Prior to Visit Medications    Medication Sig Taking?  Authorizing Provider   apixaban (ELIQUIS) 5 MG TABS tablet Take 10 mg by mouth daily Yes Emperatriz Martin MD   metoprolol succinate (TOPROL XL) 50 MG extended release tablet Take 1 tablet by mouth daily Yes Earlene Mckeon MD   atorvastatin (LIPITOR) 10 MG tablet Take 1 tablet by mouth daily Yes Earlene Mckeon MD   gabapentin (NEURONTIN) 100 MG capsule Take 1 capsule by Father         MI    Diabetes Sister     Heart Disease Brother         CARMEN [place] (Including outside providers/suppliers regularly involved in providing care):   Patient Care Team:  Inna Andrade MD as PCP - Agnieszka Edwards MD as PCP - [facility]  Refugio Parisi MD as Consulting Physician (Orthopedic Surgery)    Wt Readings from Last 3 Encounters:   09/23/20 126 lb (57.2 kg)   09/23/20 126 lb 9.6 oz (57.4 kg)   11/20/19 126 lb 12.8 oz (57.5 kg)     Vitals:    09/23/20 1129   BP: 122/66   Pulse: 72   Temp: 97.3 °F (36.3 °C)   SpO2: 97%   Weight: 126 lb (57.2 kg)   Height: 4' 10\" (1.473 m)     Body mass index is 26.33 kg/m². Based upon direct observation of the patient, evaluation of cognition reveals recent and remote memory intact. Patient's complete Health Risk Assessment and screening values have been reviewed and are found in Flowsheets. The following problems were reviewed today and where indicated follow up appointments were made and/or referrals ordered. Positive Risk Factor Screenings with Interventions:     General Health:  General  In general, how would you say your health is?: Very Good  In the past 7 days, have you experienced any of the following?  New or Increased Pain, New or Increased Fatigue, Loneliness, Social Isolation, Stress or Anger?: None of These  Do you get the social and emotional support that you need?: Yes  Do you have a Living Will?: (!) No  General Health Risk Interventions:  · No Living Will: provided the state-specific advance directive document to the patient    Hearing/Vision:  No exam data present  Hearing/Vision  Do you or your family notice any trouble with your hearing?: No  Do you have difficulty driving, watching TV, or doing any of your daily activities because of your eyesight?: No  Have you had an eye exam within the past year?: (!) No  Hearing/Vision Interventions:  · Vision concerns:  patient encouraged to make appointment with provided to the patient in written form: see Patient Instructions/AVS.    Costa LOVE, 9/23/2020, performed the documented evaluation under the direct supervision of the attending physician.

## 2020-09-24 LAB — URINE CULTURE, ROUTINE: NORMAL

## 2020-12-10 ENCOUNTER — IMMUNIZATION (OUTPATIENT)
Dept: FAMILY MEDICINE CLINIC | Age: 80
End: 2020-12-10
Payer: MEDICARE

## 2020-12-10 PROCEDURE — 90694 VACC AIIV4 NO PRSRV 0.5ML IM: CPT | Performed by: FAMILY MEDICINE

## 2020-12-10 PROCEDURE — G0008 ADMIN INFLUENZA VIRUS VAC: HCPCS | Performed by: FAMILY MEDICINE

## 2021-03-08 RX ORDER — ATORVASTATIN CALCIUM 10 MG/1
TABLET, FILM COATED ORAL
Qty: 90 TABLET | Refills: 3 | OUTPATIENT
Start: 2021-03-08

## 2021-03-12 DIAGNOSIS — I10 ESSENTIAL HYPERTENSION: Chronic | ICD-10-CM

## 2021-03-12 DIAGNOSIS — I47.1 SVT (SUPRAVENTRICULAR TACHYCARDIA) (HCC): ICD-10-CM

## 2021-03-12 DIAGNOSIS — G25.0 ESSENTIAL TREMOR: ICD-10-CM

## 2021-03-12 RX ORDER — METOPROLOL SUCCINATE 50 MG/1
TABLET, EXTENDED RELEASE ORAL
Qty: 90 TABLET | Refills: 3 | OUTPATIENT
Start: 2021-03-12

## 2021-03-24 ENCOUNTER — OFFICE VISIT (OUTPATIENT)
Dept: FAMILY MEDICINE CLINIC | Age: 81
End: 2021-03-24
Payer: MEDICARE

## 2021-03-24 VITALS
DIASTOLIC BLOOD PRESSURE: 60 MMHG | OXYGEN SATURATION: 97 % | BODY MASS INDEX: 27.75 KG/M2 | WEIGHT: 132.2 LBS | HEIGHT: 58 IN | TEMPERATURE: 97.2 F | HEART RATE: 59 BPM | SYSTOLIC BLOOD PRESSURE: 118 MMHG

## 2021-03-24 DIAGNOSIS — N39.3 STRESS INCONTINENCE OF URINE: Primary | ICD-10-CM

## 2021-03-24 DIAGNOSIS — I10 ESSENTIAL HYPERTENSION: Chronic | ICD-10-CM

## 2021-03-24 DIAGNOSIS — G25.0 ESSENTIAL TREMOR: ICD-10-CM

## 2021-03-24 DIAGNOSIS — I47.1 SVT (SUPRAVENTRICULAR TACHYCARDIA) (HCC): ICD-10-CM

## 2021-03-24 LAB
BILIRUBIN, POC: NEGATIVE
BLOOD URINE, POC: NEGATIVE
CLARITY, POC: NORMAL
COLOR, POC: YELLOW
GLUCOSE URINE, POC: NEGATIVE
KETONES, POC: NEGATIVE
LEUKOCYTE EST, POC: NORMAL
NITRITE, POC: NEGATIVE
PH, POC: 7
PROTEIN, POC: NEGATIVE
SPECIFIC GRAVITY, POC: 1.01
UROBILINOGEN, POC: 0.2

## 2021-03-24 PROCEDURE — 99213 OFFICE O/P EST LOW 20 MIN: CPT | Performed by: FAMILY MEDICINE

## 2021-03-24 PROCEDURE — 81002 URINALYSIS NONAUTO W/O SCOPE: CPT | Performed by: FAMILY MEDICINE

## 2021-03-24 PROCEDURE — 36415 COLL VENOUS BLD VENIPUNCTURE: CPT | Performed by: FAMILY MEDICINE

## 2021-03-24 RX ORDER — METOPROLOL SUCCINATE 50 MG/1
50 TABLET, EXTENDED RELEASE ORAL DAILY
Qty: 90 TABLET | Refills: 1 | Status: SHIPPED | OUTPATIENT
Start: 2021-03-24 | End: 2021-09-09 | Stop reason: SDUPTHER

## 2021-03-24 RX ORDER — ATORVASTATIN CALCIUM 10 MG/1
10 TABLET, FILM COATED ORAL DAILY
Qty: 90 TABLET | Refills: 1 | Status: SHIPPED | OUTPATIENT
Start: 2021-03-24 | End: 2021-09-09 | Stop reason: SDUPTHER

## 2021-03-24 ASSESSMENT — ENCOUNTER SYMPTOMS
BACK PAIN: 1
VOMITING: 0
EYE REDNESS: 0
SHORTNESS OF BREATH: 0
RHINORRHEA: 0
COUGH: 0
SORE THROAT: 0
DIARRHEA: 0
ABDOMINAL PAIN: 0

## 2021-03-24 NOTE — PATIENT INSTRUCTIONS
Need help scheduling your covid vaccine? Call [facility] hotline: [phone] or go to [email or web address]    PLEASE BRING YOUR MEDICATIONS TO ALL APPOINTMENTS    The diagnoses and medications listed in this after visit summary may not be accurate at the time of check out. Please check MY CHART in 28-48 hours for possible corrections. Late cancellation policy: So that we can better accommodate people who are sick, please give our office 24 hour notice for an appointment cancellation. Thank you. Missed appointments: Your care is very important to us. It is important that you keep your scheduled appointments. Multiple missed appointments will lead to a dismissal from the office. Later arrival policy: If you are more than 10 minutes late for your appointment, you will be asked to reschedule. Please allow 5-7 business days for paperwork to be processed. It is important that you check your MY Chart messages, as they include appointment reminders, test results, and other important information. If you have forgotten your password, please call [phone]. HERE ARE SOME LIFE CHANGING TIPS      1. Take your blood pressure medications at bedtime. This reduces your chance of cardiovascular event by half  2. Fever in kids: It's best to give both Tylenol and Ibuprofen at the same time rather than staggering them which is confusing  3. Follow these tips to reduce childhood obesity: Reduce unnecessary exposure to antibiotics, consume whole milk instead of skim milk, watch public TV instead of regular TV (less exposure to junk food commercials), and reduce traumatic experiences. 4. 1 egg per day is good for your heart  5. Alternate day fasting does promote weight loss. 6. Skipping breakfast increases your risk of obesity  7. Artificially sweetened drinks increase all cause mortality (strokes, BMI, cardiovascular)  8. Kale consumption can reduce onset of dementia  9. Walking at least 8000 steps per day and resistance exercise 2-3 x per week are good for your heart  10. Covid 19:  Wearing gloves is not that helpful  Having low vitamin D levels increases risk of infection (take 2-4000 units of D3 per day until our covid crisis is resolved)  11.  Brushing teeth 3 times per day can decrease chance of getting diabetes

## 2021-03-24 NOTE — PROGRESS NOTES
Patient ID: Betzy Pearl 1940    . Chief Complaint   Patient presents with    Hypertension    Back Pain         Hypertension  This is a chronic problem. The current episode started more than 1 year ago. The problem is unchanged. The problem is controlled. Pertinent negatives include no chest pain, headaches, palpitations or shortness of breath. Risk factors for coronary artery disease include post-menopausal state. Past treatments include beta blockers and diuretics. Back Pain  This is a recurrent problem. The current episode started more than 1 year ago. The problem occurs daily. The problem has been rapidly improving since onset. The pain is present in the lumbar spine. The pain is mild. Pertinent negatives include no abdominal pain, chest pain, fever, headaches or weakness. Risk factors include menopause. Treatments tried: hardly taking the gabapentin anymore. The treatment provided significant relief. Urinary Frequency   This is a new problem. The current episode started more than 1 month ago. The problem occurs intermittently (aggravated by coughing or sneezing). The problem has been unchanged. Associated symptoms include frequency. Pertinent negatives include no chills or vomiting. Review of Systems   Constitutional: Negative for chills, fatigue and fever. HENT: Negative for rhinorrhea and sore throat. No loss of taste or smell sensation   Eyes: Negative for redness. Respiratory: Negative for cough (no unusual) and shortness of breath. Cardiovascular: Negative for chest pain and palpitations. Gastrointestinal: Negative for abdominal pain, diarrhea and vomiting. Genitourinary: Positive for frequency. Musculoskeletal: Positive for back pain. Negative for arthralgias, gait problem and myalgias. Skin: Positive for rash. Neurological: Negative for weakness and headaches. Hematological: Does not bruise/bleed easily.        Patient Active Problem List   Diagnosis    Essential hypertension    Osteoporosis    Hx of colonic polyps    Degenerative disc disease, lumbar    Spondylolisthesis of lumbar region    Osteoarthritis of cervical spine    SVT (supraventricular tachycardia) (HCC)    Chronic left-sided low back pain with left-sided sciatica    Nonrheumatic aortic valve insufficiency    Mitral regurgitation    History of compression fracture of spine    Age-related osteoporosis without current pathological fracture    Sepsis due to pneumonia ([address]    Intractable nausea and vomiting    Anticoagulated    Paroxysmal atrial fibrillation ([address]       Past Surgical History:   Procedure Laterality Date    CHOLECYSTECTOMY  age 29's    open    COLONOSCOPY  2010    DILATION AND CURETTAGE OF UTERUS  age29's    FINGER TRIGGER RELEASE Right 2014    HERNIA REPAIR  2011    right ing hernia    HYSTERECTOMY  age 29's    \"took both ovaries\"    TONSILLECTOMY  age 22    VERTEBROPLASTY  8/24/15       Family History   Problem Relation Age of Onset    Heart Attack Mother         Myocardial Infarction- 76    Heart Disease Mother         MI    Diabetes Mother     High Blood Pressure Mother     Diabetes Sister         Type 2    Other Brother         AAA    Heart Disease Brother         MI    Heart Disease Father         MI    Diabetes Sister     Heart Disease Brother         MI       Current Outpatient Medications on File Prior to Visit   Medication Sig Dispense Refill    apixaban (ELIQUIS) 5 MG TABS tablet Take 10 mg by mouth daily      Chlorpheniramine Maleate (CHLOR-TRIMETON ALLERGY PO) Take 3 tablets by mouth daily      Ascorbic Acid (VITAMIN C) 500 MG tablet Take 1,000 mg by mouth daily      calcium-vitamin D (OSCAL-500) 500-200 MG-UNIT per tablet Take 1 tablet by mouth daily. No current facility-administered medications on file prior to visit. Objective:         Physical Exam  Vitals signs and nursing note reviewed. Constitutional:       General: She is not in acute distress. Appearance: She is well-developed. HENT:      Head: Normocephalic and atraumatic. Neck:      Musculoskeletal: Neck supple. Thyroid: No thyromegaly. Trachea: No tracheal deviation. Cardiovascular:      Rate and Rhythm: Normal rate and regular rhythm. Heart sounds: Normal heart sounds. Pulmonary:      Effort: Pulmonary effort is normal. No respiratory distress. Breath sounds: Normal breath sounds. No wheezing. Abdominal:      General: There is no distension. Palpations: Abdomen is soft. There is no mass. Tenderness: There is no abdominal tenderness. There is no right CVA tenderness or left CVA tenderness. Skin:     General: Skin is warm and dry. Neurological:      Mental Status: She is alert and oriented to person, place, and time. Comments: . Psychiatric:         Attention and Perception: She is attentive. Speech: Speech normal.         Behavior: Behavior normal.         Thought Content: Thought content normal.         Judgment: Judgment normal.       Vitals:    03/24/21 1101   BP: 118/60   Pulse: 59   Temp: 97.2 °F (36.2 °C)   TempSrc: Infrared   SpO2: 97%   Weight: 132 lb 3.2 oz (60 kg)   Height: 4' 10\" (1.473 m)     Body mass index is 27.63 kg/m².      Wt Readings from Last 3 Encounters:   03/24/21 132 lb 3.2 oz (60 kg)   09/23/20 126 lb (57.2 kg)   09/23/20 126 lb 9.6 oz (57.4 kg)     BP Readings from Last 3 Encounters:   03/24/21 118/60   09/23/20 122/66   09/23/20 122/66          Results for orders placed or performed in visit on 03/24/21   POCT Urinalysis no Micro   Result Value Ref Range    Color, UA yellow     Clarity, UA slightly cloudy     Glucose, UA POC negative     Bilirubin, UA negative     Ketones, UA negative     Spec Grav, UA 1.015     Blood, UA POC negative     pH, UA 7.0     Protein, UA POC negative     Urobilinogen, UA 0.2     Leukocytes, UA trace     Nitrite, UA negative The ASCVD Risk score (Minetta Riedel., et al., 2013) failed to calculate for the following reasons: The 2013 ASCVD risk score is only valid for ages 36 to 78  Lab Review   No visits with results within 2 Month(s) from this visit. Latest known visit with results is:   Office Visit on 09/23/2020   Component Date Value    Color, UA 09/23/2020 yellow     Clarity, UA 09/23/2020 cloudy     Glucose, UA POC 09/23/2020 neg     Bilirubin, UA 09/23/2020 neg     Ketones, UA 09/23/2020 trace     Spec Grav, UA 09/23/2020 >=1.030     Blood, UA POC 09/23/2020 neg     pH, UA 09/23/2020 6.0     Protein, UA POC 09/23/2020 trace     Urobilinogen, UA 09/23/2020 0.2     Leukocytes, UA 09/23/2020 neg     Nitrite, UA 09/23/2020 neg     Urine Culture, Routine 09/23/2020 No growth at 18 to 36 hours            Assessment:       Diagnosis Orders   1. Stress incontinence of urine  POCT Urinalysis no Micro   2. Essential hypertension  atorvastatin (LIPITOR) 10 MG tablet    metoprolol succinate (TOPROL XL) 50 MG extended release tablet    Comprehensive Metabolic Panel    Lipid Panel   3. SVT (supraventricular tachycardia) (HCC)  metoprolol succinate (TOPROL XL) 50 MG extended release tablet   4. Essential tremor  metoprolol succinate (TOPROL XL) 50 MG extended release tablet           Plan:      Hypertension stable. Continue current medication    Her back pain is essentially resolved. Will not refill the gabapentin at this time    Tremor stable with a beta-blocker continue current medication recheck in 6 months    If there is no urine infection, patient does not want stress incontinence medication. Return in about 25 weeks (around 9/15/2021) for HTN, In office.

## 2021-03-25 LAB
A/G RATIO: 2 (ref 1.1–2.2)
ALBUMIN SERPL-MCNC: 4.4 G/DL (ref 3.4–5)
ALP BLD-CCNC: 59 U/L (ref 40–129)
ALT SERPL-CCNC: 13 U/L (ref 10–40)
ANION GAP SERPL CALCULATED.3IONS-SCNC: 8 MMOL/L (ref 3–16)
AST SERPL-CCNC: 17 U/L (ref 15–37)
BILIRUB SERPL-MCNC: 0.8 MG/DL (ref 0–1)
BUN BLDV-MCNC: 17 MG/DL (ref 7–20)
CALCIUM SERPL-MCNC: 9.1 MG/DL (ref 8.3–10.6)
CHLORIDE BLD-SCNC: 103 MMOL/L (ref 99–110)
CHOLESTEROL, TOTAL: 137 MG/DL (ref 0–199)
CO2: 29 MMOL/L (ref 21–32)
CREAT SERPL-MCNC: 0.7 MG/DL (ref 0.6–1.2)
GFR AFRICAN AMERICAN: >60
GFR NON-AFRICAN AMERICAN: >60
GLOBULIN: 2.2 G/DL
GLUCOSE BLD-MCNC: 90 MG/DL (ref 70–99)
HDLC SERPL-MCNC: 55 MG/DL (ref 40–60)
LDL CHOLESTEROL CALCULATED: 67 MG/DL
POTASSIUM SERPL-SCNC: 4.8 MMOL/L (ref 3.5–5.1)
SODIUM BLD-SCNC: 140 MMOL/L (ref 136–145)
TOTAL PROTEIN: 6.6 G/DL (ref 6.4–8.2)
TRIGL SERPL-MCNC: 77 MG/DL (ref 0–150)
URINE CULTURE, ROUTINE: NORMAL
VLDLC SERPL CALC-MCNC: 15 MG/DL

## 2021-08-26 ENCOUNTER — VIRTUAL VISIT (OUTPATIENT)
Dept: FAMILY MEDICINE CLINIC | Age: 81
End: 2021-08-26
Payer: MEDICARE

## 2021-08-26 ENCOUNTER — HOSPITAL ENCOUNTER (OUTPATIENT)
Age: 81
Setting detail: SPECIMEN
Discharge: HOME | End: 2021-08-26
Payer: MEDICARE

## 2021-08-26 DIAGNOSIS — H57.89 SCLERAL INJECTION: ICD-10-CM

## 2021-08-26 DIAGNOSIS — R09.81 NASAL CONGESTION: Primary | ICD-10-CM

## 2021-08-26 DIAGNOSIS — M25.552 LEFT HIP PAIN: ICD-10-CM

## 2021-08-26 DIAGNOSIS — T14.8XXA BRUISING: ICD-10-CM

## 2021-08-26 PROCEDURE — 99442 PR PHYS/QHP TELEPHONE EVALUATION 11-20 MIN: CPT | Performed by: FAMILY MEDICINE

## 2021-08-26 PROCEDURE — U0003 INFECTIOUS AGENT DETECTION BY NUCLEIC ACID (DNA OR RNA); SEVERE ACUTE RESPIRATORY SYNDROME CORONAVIRUS 2 (SARS-COV-2) (CORONAVIRUS DISEASE [COVID-19]), AMPLIFIED PROBE TECHNIQUE, MAKING USE OF HIGH THROUGHPUT TECHNOLOGIES AS DESCRIBED BY CMS-2020-01-R: HCPCS

## 2021-08-26 PROCEDURE — U0005 INFEC AGEN DETEC AMPLI PROBE: HCPCS

## 2021-08-26 RX ORDER — FLUTICASONE PROPIONATE 50 MCG
2 SPRAY, SUSPENSION (ML) NASAL DAILY
Qty: 1 BOTTLE | Refills: 1 | Status: SHIPPED | OUTPATIENT
Start: 2021-08-26

## 2021-08-26 NOTE — PROGRESS NOTES
Tracy Lawrence is a 80 y.o. female evaluated via telephone on 8/26/2021. Consent:  She and/or health care decision maker is aware that that she may receive a bill for this telephone service, depending on her insurance coverage, and has provided verbal consent to proceed: Yes      Documentation:  I communicated with the patient and/or health care decision maker about     Cough:  Severe. Was productive. No fever, runny nose. Head congestion. Eyes running. Taking allergy pills which help somewhat. Took ibuprofen. Symptoms x 1 week. Corner of the right eye got. Has had her Covid vaccine. Left hip: black and blue up her leg. No injury. X 2 days. Walking OK. Knot on the right hip bone. No injury. Sore to touch. Left leg has knots in it. Details of this discussion including any medical advice provided:      Diagnosis Orders   1. Nasal congestion  fluticasone (FLONASE) 50 MCG/ACT nasal spray    COVID-19   2. Bruising     3. Scleral injection     4. Left hip pain         It sounds that the sclera was red most likely due to the pressure from her intense coughing. Since it is resolving, no further evaluation needed    I suspect that her bruising is because of her Eliquis medication. I explained to her that Eliquis can cause some unexpected bruising. Since patient is very concerned about her hip pain and knots in her legs, will move her follow-up appointment  to early next week in the office. I affirm this is a Patient Initiated Episode with a Patient who has not had a related appointment within my department in the past 7 days or scheduled within the next 24 hours.     Patient identification was verified at the start of the visit: Yes    Total Time: minutes: 11-20 minutes    The visit was conducted pursuant to the emergency declaration under the [facility], [address] [place] [address] and the [employer].  Patient identification was verified, and a caregiver was present when appropriate. The patient was located in a state where the provider was credentialed to provide care.     Note: not billable if this call serves to triage the patient into an appointment for the relevant concern      Guille Rubin MD

## 2021-08-27 ENCOUNTER — TELEPHONE (OUTPATIENT)
Dept: FAMILY MEDICINE CLINIC | Age: 81
End: 2021-08-27

## 2021-08-27 LAB
SARS-COV-2: NOT DETECTED
SOURCE: NORMAL

## 2021-08-27 NOTE — TELEPHONE ENCOUNTER
----- Message from Kendall Benson sent at 8/27/2021 11:16 AM EDT -----  Subject: Message to Provider    QUESTIONS  Information for Provider? Pt had a COVID test completed on 8/26/2021 she   is wanting the results. ---------------------------------------------------------------------------  --------------  Duran KAUR  What is the best way for the office to contact you? Do not leave any   message, patient will call back for answer  Preferred Call Back Phone Number? [phone]  ---------------------------------------------------------------------------  --------------  SCRIPT ANSWERS  Relationship to Patient?  Self

## 2021-09-08 DIAGNOSIS — G25.0 ESSENTIAL TREMOR: ICD-10-CM

## 2021-09-08 DIAGNOSIS — I10 ESSENTIAL HYPERTENSION: Chronic | ICD-10-CM

## 2021-09-08 DIAGNOSIS — I47.1 SVT (SUPRAVENTRICULAR TACHYCARDIA) (HCC): ICD-10-CM

## 2021-09-08 RX ORDER — METOPROLOL SUCCINATE 50 MG/1
TABLET, EXTENDED RELEASE ORAL
Qty: 90 TABLET | Refills: 3 | OUTPATIENT
Start: 2021-09-08

## 2021-09-09 ENCOUNTER — OFFICE VISIT (OUTPATIENT)
Dept: FAMILY MEDICINE CLINIC | Age: 81
End: 2021-09-09
Payer: MEDICARE

## 2021-09-09 VITALS
BODY MASS INDEX: 26.11 KG/M2 | HEIGHT: 58 IN | HEART RATE: 62 BPM | SYSTOLIC BLOOD PRESSURE: 120 MMHG | DIASTOLIC BLOOD PRESSURE: 78 MMHG | WEIGHT: 124.4 LBS | TEMPERATURE: 97.2 F

## 2021-09-09 DIAGNOSIS — Z23 NEED FOR SHINGLES VACCINE: ICD-10-CM

## 2021-09-09 DIAGNOSIS — I10 ESSENTIAL HYPERTENSION: Primary | Chronic | ICD-10-CM

## 2021-09-09 DIAGNOSIS — S70.02XD CONTUSION OF LEFT HIP, SUBSEQUENT ENCOUNTER: ICD-10-CM

## 2021-09-09 DIAGNOSIS — Z23 NEEDS FLU SHOT: ICD-10-CM

## 2021-09-09 DIAGNOSIS — I47.1 SVT (SUPRAVENTRICULAR TACHYCARDIA) (HCC): ICD-10-CM

## 2021-09-09 DIAGNOSIS — G25.0 ESSENTIAL TREMOR: ICD-10-CM

## 2021-09-09 PROCEDURE — 99214 OFFICE O/P EST MOD 30 MIN: CPT | Performed by: FAMILY MEDICINE

## 2021-09-09 RX ORDER — ATORVASTATIN CALCIUM 10 MG/1
10 TABLET, FILM COATED ORAL DAILY
Qty: 90 TABLET | Refills: 1 | Status: SHIPPED | OUTPATIENT
Start: 2021-09-09

## 2021-09-09 RX ORDER — ZOSTER VACCINE RECOMBINANT, ADJUVANTED 50 MCG/0.5
0.5 KIT INTRAMUSCULAR ONCE
Qty: 0.5 ML | Refills: 0 | Status: SHIPPED | OUTPATIENT
Start: 2021-09-09 | End: 2021-09-09

## 2021-09-09 RX ORDER — METOPROLOL SUCCINATE 50 MG/1
50 TABLET, EXTENDED RELEASE ORAL DAILY
Qty: 90 TABLET | Refills: 1 | Status: SHIPPED | OUTPATIENT
Start: 2021-09-09 | End: 2022-03-09 | Stop reason: SDUPTHER

## 2021-09-09 ASSESSMENT — ENCOUNTER SYMPTOMS: SHORTNESS OF BREATH: 0

## 2021-09-09 NOTE — PATIENT INSTRUCTIONS
Need help scheduling your covid vaccine? [address] [phone]       PLEASE BRING YOUR MEDICATIONS TO ALL APPOINTMENTS    The diagnoses and medications listed in this after visit summary may not be accurate at the time of check out. Please check MY CHART in 28-48 hours for possible corrections. Late cancellation policy: So that we can better accommodate people who are sick, please give our office 24 hour notice for an appointment cancellation. Thank you. Missed appointments: Your care is very important to us. It is important that you keep your scheduled appointments. Multiple missed appointments will lead to a dismissal from the office. Later arrival policy: If you are more than 10 minutes late for your appointment, you will be asked to re-schedule. Please allow 5-7 business days for paperwork to be processed. It is important that you check your MY Chart messages, as they include appointment reminders, test results, and other important information. If you have forgotten your password, please call [phone]. HERE ARE SOME LIFE CHANGING TIPS      1. Take your blood pressure medications at bedtime. This reduces your chance of cardiovascular event by half  2. Fever in kids:  Give both Tylenol and Ibuprofen at the same time rather than staggering them which is confusing  3. Follow these tips to reduce childhood obesity: Reduce unnecessary exposure to antibiotics, consume whole milk instead of skim milk, watch public TV instead of regular TV (less exposure to junk food commercials), and reduce traumatic experiences. 4. 1 egg per day is good for your heart  5. Alternate day fasting does promote weight loss. Skipping breakfast increases your risk of obesity  6. Artificially sweetened drinks increase all cause mortality (strokes, body mass index, cardiovascular disease)  7. Kale consumption can reduce onset of dementia  8.  Walking at least 8000 steps per day and resistance exercise 2-3 x per week are good for your heart  9. Brushing teeth 3 times per day can decrease chance of getting diabetes  10. Treatment of acute cough: honey for kids over the age of 3. Naproxen 500 mg 3 times per day in adults. Otherwise NOTHING ELSE WORKS for cough  11. Antibiotic use is associated with a lifetime increased risk of breast cancer and heart disease.

## 2021-09-09 NOTE — PROGRESS NOTES
\"took both ovaries\"    TONSILLECTOMY  age 22    VERTEBROPLASTY  8/24/15       Family History   Problem Relation Age of Onset    Heart Attack Mother         Myocardial Infarction- 76    Heart Disease Mother         MI    Diabetes Mother     High Blood Pressure Mother     Diabetes Sister         Type 2    Other Brother         AAA    Heart Disease Brother         MI    Heart Disease Father         MI    Diabetes Sister     Heart Disease Brother         MI       Current Outpatient Medications on File Prior to Visit   Medication Sig Dispense Refill    fluticasone (FLONASE) 50 MCG/ACT nasal spray 2 sprays by Each Nostril route daily 1 Bottle 1    apixaban (ELIQUIS) 5 MG TABS tablet Take 10 mg by mouth daily      Ascorbic Acid (VITAMIN C) 500 MG tablet Take 1,000 mg by mouth daily      calcium-vitamin D (OSCAL-500) 500-200 MG-UNIT per tablet Take 1 tablet by mouth daily. No current facility-administered medications on file prior to visit. Objective:         Physical Exam  Vitals and nursing note reviewed. Constitutional:       Appearance: She is well-developed. HENT:      Head: Normocephalic and atraumatic. Eyes:      Conjunctiva/sclera:      Right eye: Right conjunctiva is not injected. Left eye: Left conjunctiva is not injected. Cardiovascular:      Rate and Rhythm: Normal rate and regular rhythm. Heart sounds: Normal heart sounds, S1 normal and S2 normal.   Pulmonary:      Effort: Pulmonary effort is normal. No respiratory distress. Breath sounds: Normal breath sounds. No wheezing. Musculoskeletal:      Cervical back: Neck supple. Thoracic back: Normal range of motion. Lumbar back: Normal range of motion. Right lower leg: No edema. Left lower leg: No edema. Legs:    Skin:     General: Skin is warm and dry. Neurological:      Mental Status: She is alert.        Vitals:    21 0829   BP: 120/78   Site: Left Upper Arm   Position: Sitting   Cuff Size: Medium Adult   Pulse: 62   Temp: 97.2 °F (36.2 °C)   TempSrc: Infrared   Weight: 124 lb 6.4 oz (56.4 kg)   Height: 4' 10\" (1.473 m)     Body mass index is 26 kg/m². Wt Readings from Last 3 Encounters:   09/09/21 124 lb 6.4 oz (56.4 kg)   03/24/21 132 lb 3.2 oz (60 kg)   09/23/20 126 lb (57.2 kg)     BP Readings from Last 3 Encounters:   09/09/21 120/78   03/24/21 118/60   09/23/20 122/66          No results found for this visit on 09/09/21. The ASCVD Risk score (Ashok Lopez., et al., 2013) failed to calculate for the following reasons: The 2013 ASCVD risk score is only valid for ages 36 to 78  [facility] Outpatient Visit on 08/26/2021   Component Date Value    Source 08/26/2021 THROAT     SARS-CoV-2 08/26/2021 NOT DETECTED            Assessment:       Diagnosis Orders   1. Essential hypertension  atorvastatin (LIPITOR) 10 MG tablet    metoprolol succinate (TOPROL XL) 50 MG extended release tablet   2. Need for shingles vaccine  zoster recombinant adjuvanted vaccine [facility]) 50 MCG/0.5ML SUSR injection   3. SVT (supraventricular tachycardia) (HCC)  metoprolol succinate (TOPROL XL) 50 MG extended release tablet   4. Essential tremor  metoprolol succinate (TOPROL XL) 50 MG extended release tablet   5. Needs flu shot     6. Contusion of left hip, subsequent encounter             Plan:      I think patient's bruising is likely due to being on Xarelto and then taking an anti-inflammatory. She likely had some trauma to the hip area because the bruising. She is to talk to her cardiologist about the bruising and the Xarelto. Hypertension well controlled. Continue the metoprolol which also helps with her essential tremor and SVT. I could not palpate any abnormality in her thighs. Patient declined a flu shot today. Return in about 25 weeks (around 3/3/2022) for HTN, Hyperlipid, AWV LPN please schedule.

## 2021-10-17 ENCOUNTER — APPOINTMENT (OUTPATIENT)
Dept: CT IMAGING | Age: 81
DRG: 480 | End: 2021-10-17
Payer: MEDICARE

## 2021-10-17 ENCOUNTER — HOSPITAL ENCOUNTER (INPATIENT)
Age: 81
LOS: 7 days | Discharge: HOME HEALTH | DRG: 480 | End: 2021-10-25
Attending: INTERNAL MEDICINE | Admitting: INTERNAL MEDICINE
Payer: MEDICARE

## 2021-10-17 DIAGNOSIS — S72.009A CLOSED FRACTURE OF HIP, UNSPECIFIED LATERALITY, INITIAL ENCOUNTER (HCC): Primary | ICD-10-CM

## 2021-10-17 LAB
ANION GAP SERPL CALCULATED.3IONS-SCNC: 8 MMOL/L (ref 4–16)
BASOPHILS ABSOLUTE: 0 K/CU MM
BASOPHILS RELATIVE PERCENT: 0.4 % (ref 0–1)
BUN BLDV-MCNC: 23 MG/DL (ref 6–23)
CALCIUM SERPL-MCNC: 8.9 MG/DL (ref 8.3–10.6)
CHLORIDE BLD-SCNC: 104 MMOL/L (ref 99–110)
CO2: 25 MMOL/L (ref 21–32)
CREAT SERPL-MCNC: 0.5 MG/DL (ref 0.6–1.1)
DIFFERENTIAL TYPE: ABNORMAL
EOSINOPHILS ABSOLUTE: 0.2 K/CU MM
EOSINOPHILS RELATIVE PERCENT: 2 % (ref 0–3)
GFR AFRICAN AMERICAN: >60 ML/MIN/1.73M2
GFR NON-AFRICAN AMERICAN: >60 ML/MIN/1.73M2
GLUCOSE BLD-MCNC: 139 MG/DL (ref 70–99)
HCT VFR BLD CALC: 41.7 % (ref 37–47)
HEMOGLOBIN: 13.3 GM/DL (ref 12.5–16)
IMMATURE NEUTROPHIL %: 0.4 % (ref 0–0.43)
LYMPHOCYTES ABSOLUTE: 2.7 K/CU MM
LYMPHOCYTES RELATIVE PERCENT: 37.2 % (ref 24–44)
MCH RBC QN AUTO: 32 PG (ref 27–31)
MCHC RBC AUTO-ENTMCNC: 31.9 % (ref 32–36)
MCV RBC AUTO: 100.5 FL (ref 78–100)
MONOCYTES ABSOLUTE: 0.5 K/CU MM
MONOCYTES RELATIVE PERCENT: 6.3 % (ref 0–4)
NUCLEATED RBC %: 0 %
PDW BLD-RTO: 12.2 % (ref 11.7–14.9)
PLATELET # BLD: 150 K/CU MM (ref 140–440)
PMV BLD AUTO: 10.9 FL (ref 7.5–11.1)
POTASSIUM SERPL-SCNC: 3.6 MMOL/L (ref 3.5–5.1)
RBC # BLD: 4.15 M/CU MM (ref 4.2–5.4)
SEGMENTED NEUTROPHILS ABSOLUTE COUNT: 3.9 K/CU MM
SEGMENTED NEUTROPHILS RELATIVE PERCENT: 53.7 % (ref 36–66)
SODIUM BLD-SCNC: 137 MMOL/L (ref 135–145)
TOTAL IMMATURE NEUTOROPHIL: 0.03 K/CU MM
TOTAL NUCLEATED RBC: 0 K/CU MM
WBC # BLD: 7.3 K/CU MM (ref 4–10.5)

## 2021-10-17 PROCEDURE — 72125 CT NECK SPINE W/O DYE: CPT

## 2021-10-17 PROCEDURE — 86900 BLOOD TYPING SEROLOGIC ABO: CPT

## 2021-10-17 PROCEDURE — 86850 RBC ANTIBODY SCREEN: CPT

## 2021-10-17 PROCEDURE — 80048 BASIC METABOLIC PNL TOTAL CA: CPT

## 2021-10-17 PROCEDURE — 72192 CT PELVIS W/O DYE: CPT

## 2021-10-17 PROCEDURE — 86901 BLOOD TYPING SEROLOGIC RH(D): CPT

## 2021-10-17 PROCEDURE — 36415 COLL VENOUS BLD VENIPUNCTURE: CPT

## 2021-10-17 PROCEDURE — 99285 EMERGENCY DEPT VISIT HI MDM: CPT

## 2021-10-17 PROCEDURE — 6360000002 HC RX W HCPCS: Performed by: PHYSICIAN ASSISTANT

## 2021-10-17 PROCEDURE — 85025 COMPLETE CBC W/AUTO DIFF WBC: CPT

## 2021-10-17 PROCEDURE — 96374 THER/PROPH/DIAG INJ IV PUSH: CPT

## 2021-10-17 PROCEDURE — 70450 CT HEAD/BRAIN W/O DYE: CPT

## 2021-10-17 PROCEDURE — 96376 TX/PRO/DX INJ SAME DRUG ADON: CPT

## 2021-10-17 RX ORDER — MORPHINE SULFATE 2 MG/ML
4 INJECTION, SOLUTION INTRAMUSCULAR; INTRAVENOUS EVERY 30 MIN PRN
Status: DISCONTINUED | OUTPATIENT
Start: 2021-10-17 | End: 2021-10-18 | Stop reason: ALTCHOICE

## 2021-10-17 RX ADMIN — MORPHINE SULFATE 4 MG: 2 INJECTION, SOLUTION INTRAMUSCULAR; INTRAVENOUS at 20:44

## 2021-10-17 RX ADMIN — MORPHINE SULFATE 4 MG: 2 INJECTION, SOLUTION INTRAMUSCULAR; INTRAVENOUS at 22:21

## 2021-10-17 ASSESSMENT — PAIN DESCRIPTION - LOCATION: LOCATION: HIP

## 2021-10-17 ASSESSMENT — PAIN SCALES - GENERAL
PAINLEVEL_OUTOF10: 9

## 2021-10-17 ASSESSMENT — PAIN DESCRIPTION - PAIN TYPE: TYPE: ACUTE PAIN

## 2021-10-18 ENCOUNTER — ANESTHESIA (OUTPATIENT)
Dept: OPERATING ROOM | Age: 81
DRG: 480 | End: 2021-10-18
Payer: MEDICARE

## 2021-10-18 ENCOUNTER — APPOINTMENT (OUTPATIENT)
Dept: GENERAL RADIOLOGY | Age: 81
DRG: 480 | End: 2021-10-18
Payer: MEDICARE

## 2021-10-18 ENCOUNTER — ANESTHESIA EVENT (OUTPATIENT)
Dept: OPERATING ROOM | Age: 81
DRG: 480 | End: 2021-10-18
Payer: MEDICARE

## 2021-10-18 VITALS
SYSTOLIC BLOOD PRESSURE: 96 MMHG | RESPIRATION RATE: 5 BRPM | OXYGEN SATURATION: 100 % | DIASTOLIC BLOOD PRESSURE: 62 MMHG | TEMPERATURE: 98.2 F

## 2021-10-18 PROBLEM — S72.142A INTERTROCHANTERIC FRACTURE OF LEFT FEMUR, CLOSED, INITIAL ENCOUNTER (HCC): Status: ACTIVE | Noted: 2021-10-18

## 2021-10-18 LAB
ANION GAP SERPL CALCULATED.3IONS-SCNC: 8 MMOL/L (ref 4–16)
BASOPHILS ABSOLUTE: 0 K/CU MM
BASOPHILS RELATIVE PERCENT: 0.1 % (ref 0–1)
BUN BLDV-MCNC: 21 MG/DL (ref 6–23)
CALCIUM SERPL-MCNC: 8.4 MG/DL (ref 8.3–10.6)
CHLORIDE BLD-SCNC: 104 MMOL/L (ref 99–110)
CO2: 24 MMOL/L (ref 21–32)
CREAT SERPL-MCNC: 0.4 MG/DL (ref 0.6–1.1)
DIFFERENTIAL TYPE: ABNORMAL
EOSINOPHILS ABSOLUTE: 0 K/CU MM
EOSINOPHILS RELATIVE PERCENT: 0 % (ref 0–3)
GFR AFRICAN AMERICAN: >60 ML/MIN/1.73M2
GFR NON-AFRICAN AMERICAN: >60 ML/MIN/1.73M2
GLUCOSE BLD-MCNC: 146 MG/DL (ref 70–99)
GLUCOSE BLD-MCNC: 170 MG/DL (ref 70–99)
HCT VFR BLD CALC: 36.3 % (ref 37–47)
HEMOGLOBIN: 11.9 GM/DL (ref 12.5–16)
IMMATURE NEUTROPHIL %: 0.2 % (ref 0–0.43)
INR BLD: 1.08 INDEX
LYMPHOCYTES ABSOLUTE: 0.5 K/CU MM
LYMPHOCYTES RELATIVE PERCENT: 5.6 % (ref 24–44)
MCH RBC QN AUTO: 32.2 PG (ref 27–31)
MCHC RBC AUTO-ENTMCNC: 32.8 % (ref 32–36)
MCV RBC AUTO: 98.1 FL (ref 78–100)
MONOCYTES ABSOLUTE: 0.4 K/CU MM
MONOCYTES RELATIVE PERCENT: 4.3 % (ref 0–4)
NUCLEATED RBC %: 0 %
PDW BLD-RTO: 12.2 % (ref 11.7–14.9)
PLATELET # BLD: 115 K/CU MM (ref 140–440)
PMV BLD AUTO: 10.8 FL (ref 7.5–11.1)
POTASSIUM SERPL-SCNC: 4 MMOL/L (ref 3.5–5.1)
PROTHROMBIN TIME: 14 SECONDS (ref 11.7–14.5)
RBC # BLD: 3.7 M/CU MM (ref 4.2–5.4)
SARS-COV-2, NAAT: NOT DETECTED
SEGMENTED NEUTROPHILS ABSOLUTE COUNT: 8 K/CU MM
SEGMENTED NEUTROPHILS RELATIVE PERCENT: 89.8 % (ref 36–66)
SODIUM BLD-SCNC: 136 MMOL/L (ref 135–145)
SOURCE: NORMAL
TOTAL IMMATURE NEUTOROPHIL: 0.02 K/CU MM
TOTAL NUCLEATED RBC: 0 K/CU MM
WBC # BLD: 8.9 K/CU MM (ref 4–10.5)

## 2021-10-18 PROCEDURE — 7100000000 HC PACU RECOVERY - FIRST 15 MIN: Performed by: STUDENT IN AN ORGANIZED HEALTH CARE EDUCATION/TRAINING PROGRAM

## 2021-10-18 PROCEDURE — 2580000003 HC RX 258: Performed by: STUDENT IN AN ORGANIZED HEALTH CARE EDUCATION/TRAINING PROGRAM

## 2021-10-18 PROCEDURE — 6360000002 HC RX W HCPCS: Performed by: ANESTHESIOLOGY

## 2021-10-18 PROCEDURE — 73502 X-RAY EXAM HIP UNI 2-3 VIEWS: CPT

## 2021-10-18 PROCEDURE — 96374 THER/PROPH/DIAG INJ IV PUSH: CPT

## 2021-10-18 PROCEDURE — 6360000002 HC RX W HCPCS: Performed by: STUDENT IN AN ORGANIZED HEALTH CARE EDUCATION/TRAINING PROGRAM

## 2021-10-18 PROCEDURE — 7100000001 HC PACU RECOVERY - ADDTL 15 MIN: Performed by: STUDENT IN AN ORGANIZED HEALTH CARE EDUCATION/TRAINING PROGRAM

## 2021-10-18 PROCEDURE — 1200000000 HC SEMI PRIVATE

## 2021-10-18 PROCEDURE — 6360000002 HC RX W HCPCS: Performed by: INTERNAL MEDICINE

## 2021-10-18 PROCEDURE — 2580000003 HC RX 258: Performed by: INTERNAL MEDICINE

## 2021-10-18 PROCEDURE — 2580000003 HC RX 258: Performed by: NURSE ANESTHETIST, CERTIFIED REGISTERED

## 2021-10-18 PROCEDURE — 2780000010 HC IMPLANT OTHER: Performed by: STUDENT IN AN ORGANIZED HEALTH CARE EDUCATION/TRAINING PROGRAM

## 2021-10-18 PROCEDURE — 82962 GLUCOSE BLOOD TEST: CPT

## 2021-10-18 PROCEDURE — 6360000002 HC RX W HCPCS: Performed by: NURSE ANESTHETIST, CERTIFIED REGISTERED

## 2021-10-18 PROCEDURE — 99222 1ST HOSP IP/OBS MODERATE 55: CPT | Performed by: STUDENT IN AN ORGANIZED HEALTH CARE EDUCATION/TRAINING PROGRAM

## 2021-10-18 PROCEDURE — 85610 PROTHROMBIN TIME: CPT

## 2021-10-18 PROCEDURE — 3700000000 HC ANESTHESIA ATTENDED CARE: Performed by: STUDENT IN AN ORGANIZED HEALTH CARE EDUCATION/TRAINING PROGRAM

## 2021-10-18 PROCEDURE — 6360000002 HC RX W HCPCS: Performed by: PHYSICIAN ASSISTANT

## 2021-10-18 PROCEDURE — 2500000003 HC RX 250 WO HCPCS: Performed by: NURSE ANESTHETIST, CERTIFIED REGISTERED

## 2021-10-18 PROCEDURE — 6370000000 HC RX 637 (ALT 250 FOR IP): Performed by: STUDENT IN AN ORGANIZED HEALTH CARE EDUCATION/TRAINING PROGRAM

## 2021-10-18 PROCEDURE — 76000 FLUOROSCOPY <1 HR PHYS/QHP: CPT

## 2021-10-18 PROCEDURE — 85025 COMPLETE CBC W/AUTO DIFF WBC: CPT

## 2021-10-18 PROCEDURE — 36415 COLL VENOUS BLD VENIPUNCTURE: CPT

## 2021-10-18 PROCEDURE — 2500000003 HC RX 250 WO HCPCS: Performed by: STUDENT IN AN ORGANIZED HEALTH CARE EDUCATION/TRAINING PROGRAM

## 2021-10-18 PROCEDURE — 87635 SARS-COV-2 COVID-19 AMP PRB: CPT

## 2021-10-18 PROCEDURE — 0QH706Z INSERTION OF INTRAMEDULLARY INTERNAL FIXATION DEVICE INTO LEFT UPPER FEMUR, OPEN APPROACH: ICD-10-PCS | Performed by: STUDENT IN AN ORGANIZED HEALTH CARE EDUCATION/TRAINING PROGRAM

## 2021-10-18 PROCEDURE — 3700000001 HC ADD 15 MINUTES (ANESTHESIA): Performed by: STUDENT IN AN ORGANIZED HEALTH CARE EDUCATION/TRAINING PROGRAM

## 2021-10-18 PROCEDURE — 2709999900 HC NON-CHARGEABLE SUPPLY: Performed by: STUDENT IN AN ORGANIZED HEALTH CARE EDUCATION/TRAINING PROGRAM

## 2021-10-18 PROCEDURE — 3600000014 HC SURGERY LEVEL 4 ADDTL 15MIN: Performed by: STUDENT IN AN ORGANIZED HEALTH CARE EDUCATION/TRAINING PROGRAM

## 2021-10-18 PROCEDURE — 3600000004 HC SURGERY LEVEL 4 BASE: Performed by: STUDENT IN AN ORGANIZED HEALTH CARE EDUCATION/TRAINING PROGRAM

## 2021-10-18 PROCEDURE — 80048 BASIC METABOLIC PNL TOTAL CA: CPT

## 2021-10-18 PROCEDURE — 2720000010 HC SURG SUPPLY STERILE: Performed by: STUDENT IN AN ORGANIZED HEALTH CARE EDUCATION/TRAINING PROGRAM

## 2021-10-18 PROCEDURE — 27245 TREAT THIGH FRACTURE: CPT | Performed by: STUDENT IN AN ORGANIZED HEALTH CARE EDUCATION/TRAINING PROGRAM

## 2021-10-18 RX ORDER — POLYETHYLENE GLYCOL 3350 17 G/17G
17 POWDER, FOR SOLUTION ORAL DAILY PRN
Status: DISCONTINUED | OUTPATIENT
Start: 2021-10-18 | End: 2021-10-25 | Stop reason: HOSPADM

## 2021-10-18 RX ORDER — ONDANSETRON 2 MG/ML
4 INJECTION INTRAMUSCULAR; INTRAVENOUS
Status: COMPLETED | OUTPATIENT
Start: 2021-10-18 | End: 2021-10-18

## 2021-10-18 RX ORDER — SODIUM CHLORIDE 9 MG/ML
INJECTION, SOLUTION INTRAVENOUS CONTINUOUS
Status: DISCONTINUED | OUTPATIENT
Start: 2021-10-18 | End: 2021-10-20

## 2021-10-18 RX ORDER — FENTANYL CITRATE 50 UG/ML
INJECTION, SOLUTION INTRAMUSCULAR; INTRAVENOUS PRN
Status: DISCONTINUED | OUTPATIENT
Start: 2021-10-18 | End: 2021-10-18 | Stop reason: SDUPTHER

## 2021-10-18 RX ORDER — SODIUM CHLORIDE 9 MG/ML
25 INJECTION, SOLUTION INTRAVENOUS PRN
Status: DISCONTINUED | OUTPATIENT
Start: 2021-10-18 | End: 2021-10-25 | Stop reason: HOSPADM

## 2021-10-18 RX ORDER — SODIUM CHLORIDE 0.9 % (FLUSH) 0.9 %
5-40 SYRINGE (ML) INJECTION PRN
Status: DISCONTINUED | OUTPATIENT
Start: 2021-10-18 | End: 2021-10-25 | Stop reason: HOSPADM

## 2021-10-18 RX ORDER — ATORVASTATIN CALCIUM 20 MG/1
10 TABLET, FILM COATED ORAL DAILY
Status: DISCONTINUED | OUTPATIENT
Start: 2021-10-18 | End: 2021-10-25 | Stop reason: HOSPADM

## 2021-10-18 RX ORDER — SODIUM CHLORIDE 0.9 % (FLUSH) 0.9 %
5-40 SYRINGE (ML) INJECTION EVERY 12 HOURS SCHEDULED
Status: DISCONTINUED | OUTPATIENT
Start: 2021-10-18 | End: 2021-10-25 | Stop reason: HOSPADM

## 2021-10-18 RX ORDER — SODIUM CHLORIDE, SODIUM LACTATE, POTASSIUM CHLORIDE, CALCIUM CHLORIDE 600; 310; 30; 20 MG/100ML; MG/100ML; MG/100ML; MG/100ML
INJECTION, SOLUTION INTRAVENOUS CONTINUOUS PRN
Status: DISCONTINUED | OUTPATIENT
Start: 2021-10-18 | End: 2021-10-18 | Stop reason: SDUPTHER

## 2021-10-18 RX ORDER — LIDOCAINE HYDROCHLORIDE 20 MG/ML
INJECTION, SOLUTION INTRAVENOUS PRN
Status: DISCONTINUED | OUTPATIENT
Start: 2021-10-18 | End: 2021-10-18 | Stop reason: SDUPTHER

## 2021-10-18 RX ORDER — MORPHINE SULFATE 2 MG/ML
4 INJECTION, SOLUTION INTRAMUSCULAR; INTRAVENOUS EVERY 30 MIN PRN
Status: DISCONTINUED | OUTPATIENT
Start: 2021-10-18 | End: 2021-10-22 | Stop reason: CLARIF

## 2021-10-18 RX ORDER — KETOROLAC TROMETHAMINE 30 MG/ML
INJECTION, SOLUTION INTRAMUSCULAR; INTRAVENOUS PRN
Status: DISCONTINUED | OUTPATIENT
Start: 2021-10-18 | End: 2021-10-18 | Stop reason: SDUPTHER

## 2021-10-18 RX ORDER — PROPOFOL 10 MG/ML
INJECTION, EMULSION INTRAVENOUS PRN
Status: DISCONTINUED | OUTPATIENT
Start: 2021-10-18 | End: 2021-10-18 | Stop reason: SDUPTHER

## 2021-10-18 RX ORDER — DEXAMETHASONE SODIUM PHOSPHATE 4 MG/ML
INJECTION, SOLUTION INTRA-ARTICULAR; INTRALESIONAL; INTRAMUSCULAR; INTRAVENOUS; SOFT TISSUE PRN
Status: DISCONTINUED | OUTPATIENT
Start: 2021-10-18 | End: 2021-10-18 | Stop reason: SDUPTHER

## 2021-10-18 RX ORDER — CEFAZOLIN SODIUM 2 G/100ML
2000 INJECTION, SOLUTION INTRAVENOUS EVERY 8 HOURS
Status: COMPLETED | OUTPATIENT
Start: 2021-10-18 | End: 2021-10-19

## 2021-10-18 RX ORDER — LABETALOL HYDROCHLORIDE 5 MG/ML
5 INJECTION, SOLUTION INTRAVENOUS EVERY 10 MIN PRN
Status: DISCONTINUED | OUTPATIENT
Start: 2021-10-18 | End: 2021-10-18 | Stop reason: HOSPADM

## 2021-10-18 RX ORDER — PHENYLEPHRINE HCL IN 0.9% NACL 1 MG/10 ML
SYRINGE (ML) INTRAVENOUS PRN
Status: DISCONTINUED | OUTPATIENT
Start: 2021-10-18 | End: 2021-10-18 | Stop reason: SDUPTHER

## 2021-10-18 RX ORDER — ONDANSETRON 2 MG/ML
INJECTION INTRAMUSCULAR; INTRAVENOUS PRN
Status: DISCONTINUED | OUTPATIENT
Start: 2021-10-18 | End: 2021-10-18 | Stop reason: SDUPTHER

## 2021-10-18 RX ORDER — ACETAMINOPHEN 650 MG/1
650 SUPPOSITORY RECTAL EVERY 6 HOURS PRN
Status: DISCONTINUED | OUTPATIENT
Start: 2021-10-18 | End: 2021-10-25 | Stop reason: HOSPADM

## 2021-10-18 RX ORDER — SODIUM CHLORIDE 9 MG/ML
INJECTION, SOLUTION INTRAVENOUS CONTINUOUS
Status: DISCONTINUED | OUTPATIENT
Start: 2021-10-18 | End: 2021-10-18 | Stop reason: SDUPTHER

## 2021-10-18 RX ORDER — METOPROLOL SUCCINATE 50 MG/1
50 TABLET, EXTENDED RELEASE ORAL DAILY
Status: DISCONTINUED | OUTPATIENT
Start: 2021-10-18 | End: 2021-10-25 | Stop reason: HOSPADM

## 2021-10-18 RX ORDER — FENTANYL CITRATE 50 UG/ML
50 INJECTION, SOLUTION INTRAMUSCULAR; INTRAVENOUS EVERY 5 MIN PRN
Status: DISCONTINUED | OUTPATIENT
Start: 2021-10-18 | End: 2021-10-18 | Stop reason: HOSPADM

## 2021-10-18 RX ORDER — CEFAZOLIN SODIUM 2 G/100ML
2000 INJECTION, SOLUTION INTRAVENOUS
Status: COMPLETED | OUTPATIENT
Start: 2021-10-18 | End: 2021-10-18

## 2021-10-18 RX ORDER — ONDANSETRON 2 MG/ML
4 INJECTION INTRAMUSCULAR; INTRAVENOUS
Status: DISCONTINUED | OUTPATIENT
Start: 2021-10-18 | End: 2021-10-18 | Stop reason: HOSPADM

## 2021-10-18 RX ORDER — SENNA AND DOCUSATE SODIUM 50; 8.6 MG/1; MG/1
1 TABLET, FILM COATED ORAL 2 TIMES DAILY
Status: DISCONTINUED | OUTPATIENT
Start: 2021-10-18 | End: 2021-10-25 | Stop reason: HOSPADM

## 2021-10-18 RX ORDER — ROCURONIUM BROMIDE 10 MG/ML
INJECTION, SOLUTION INTRAVENOUS PRN
Status: DISCONTINUED | OUTPATIENT
Start: 2021-10-18 | End: 2021-10-18 | Stop reason: SDUPTHER

## 2021-10-18 RX ORDER — FENTANYL CITRATE 50 UG/ML
25 INJECTION, SOLUTION INTRAMUSCULAR; INTRAVENOUS EVERY 5 MIN PRN
Status: DISCONTINUED | OUTPATIENT
Start: 2021-10-18 | End: 2021-10-18 | Stop reason: HOSPADM

## 2021-10-18 RX ORDER — ONDANSETRON 2 MG/ML
4 INJECTION INTRAMUSCULAR; INTRAVENOUS EVERY 6 HOURS PRN
Status: DISCONTINUED | OUTPATIENT
Start: 2021-10-18 | End: 2021-10-25 | Stop reason: HOSPADM

## 2021-10-18 RX ORDER — BUPIVACAINE HYDROCHLORIDE 2.5 MG/ML
INJECTION, SOLUTION EPIDURAL; INFILTRATION; INTRACAUDAL
Status: COMPLETED | OUTPATIENT
Start: 2021-10-18 | End: 2021-10-18

## 2021-10-18 RX ORDER — ACETAMINOPHEN 325 MG/1
650 TABLET ORAL EVERY 6 HOURS PRN
Status: DISCONTINUED | OUTPATIENT
Start: 2021-10-18 | End: 2021-10-25 | Stop reason: HOSPADM

## 2021-10-18 RX ORDER — ETOMIDATE 2 MG/ML
INJECTION INTRAVENOUS PRN
Status: DISCONTINUED | OUTPATIENT
Start: 2021-10-18 | End: 2021-10-18 | Stop reason: SDUPTHER

## 2021-10-18 RX ORDER — HYDRALAZINE HYDROCHLORIDE 20 MG/ML
5 INJECTION INTRAMUSCULAR; INTRAVENOUS EVERY 10 MIN PRN
Status: DISCONTINUED | OUTPATIENT
Start: 2021-10-18 | End: 2021-10-18 | Stop reason: HOSPADM

## 2021-10-18 RX ORDER — ONDANSETRON 4 MG/1
4 TABLET, ORALLY DISINTEGRATING ORAL EVERY 8 HOURS PRN
Status: DISCONTINUED | OUTPATIENT
Start: 2021-10-18 | End: 2021-10-25 | Stop reason: HOSPADM

## 2021-10-18 RX ADMIN — ROCURONIUM BROMIDE 30 MG: 10 INJECTION INTRAVENOUS at 10:25

## 2021-10-18 RX ADMIN — PROPOFOL 50 MG: 10 INJECTION, EMULSION INTRAVENOUS at 10:24

## 2021-10-18 RX ADMIN — MORPHINE SULFATE 4 MG: 2 INJECTION, SOLUTION INTRAMUSCULAR; INTRAVENOUS at 02:25

## 2021-10-18 RX ADMIN — MORPHINE SULFATE 4 MG: 2 INJECTION, SOLUTION INTRAMUSCULAR; INTRAVENOUS at 05:05

## 2021-10-18 RX ADMIN — SODIUM CHLORIDE, PRESERVATIVE FREE 10 ML: 5 INJECTION INTRAVENOUS at 08:53

## 2021-10-18 RX ADMIN — Medication 50 MCG: at 10:52

## 2021-10-18 RX ADMIN — ONDANSETRON 4 MG: 2 INJECTION INTRAMUSCULAR; INTRAVENOUS at 05:04

## 2021-10-18 RX ADMIN — KETOROLAC TROMETHAMINE 15 MG: 30 INJECTION, SOLUTION INTRAMUSCULAR; INTRAVENOUS at 12:12

## 2021-10-18 RX ADMIN — ONDANSETRON 4 MG: 2 INJECTION INTRAMUSCULAR; INTRAVENOUS at 10:40

## 2021-10-18 RX ADMIN — ONDANSETRON 4 MG: 2 INJECTION INTRAMUSCULAR; INTRAVENOUS at 21:59

## 2021-10-18 RX ADMIN — SODIUM CHLORIDE, POTASSIUM CHLORIDE, SODIUM LACTATE AND CALCIUM CHLORIDE: 600; 310; 30; 20 INJECTION, SOLUTION INTRAVENOUS at 10:22

## 2021-10-18 RX ADMIN — CEFAZOLIN SODIUM 2000 MG: 2 INJECTION, SOLUTION INTRAVENOUS at 18:36

## 2021-10-18 RX ADMIN — FENTANYL CITRATE 50 MCG: 50 INJECTION, SOLUTION INTRAMUSCULAR; INTRAVENOUS at 10:37

## 2021-10-18 RX ADMIN — SODIUM CHLORIDE: 9 INJECTION, SOLUTION INTRAVENOUS at 12:58

## 2021-10-18 RX ADMIN — ONDANSETRON 4 MG: 2 INJECTION INTRAMUSCULAR; INTRAVENOUS at 13:06

## 2021-10-18 RX ADMIN — CEFAZOLIN SODIUM 2000 MG: 2 INJECTION, SOLUTION INTRAVENOUS at 10:37

## 2021-10-18 RX ADMIN — LIDOCAINE HYDROCHLORIDE 80 MG: 20 INJECTION, SOLUTION INTRAVENOUS at 10:24

## 2021-10-18 RX ADMIN — FENTANYL CITRATE 50 MCG: 50 INJECTION, SOLUTION INTRAMUSCULAR; INTRAVENOUS at 12:12

## 2021-10-18 RX ADMIN — ETOMIDATE 10 MG: 2 INJECTION, SOLUTION INTRAVENOUS at 10:24

## 2021-10-18 RX ADMIN — SODIUM CHLORIDE: 9 INJECTION, SOLUTION INTRAVENOUS at 21:54

## 2021-10-18 RX ADMIN — SENNOSIDES, DOCUSATE SODIUM 1 TABLET: 8.6; 5 TABLET ORAL at 21:48

## 2021-10-18 RX ADMIN — DEXAMETHASONE SODIUM PHOSPHATE 4 MG: 4 INJECTION, SOLUTION INTRAMUSCULAR; INTRAVENOUS at 10:39

## 2021-10-18 RX ADMIN — SODIUM CHLORIDE 25 ML: 9 INJECTION, SOLUTION INTRAVENOUS at 18:34

## 2021-10-18 RX ADMIN — SUGAMMADEX 200 MG: 100 INJECTION, SOLUTION INTRAVENOUS at 12:13

## 2021-10-18 RX ADMIN — SODIUM CHLORIDE, PRESERVATIVE FREE 10 ML: 5 INJECTION INTRAVENOUS at 21:49

## 2021-10-18 RX ADMIN — PROPOFOL 30 MG: 10 INJECTION, EMULSION INTRAVENOUS at 10:29

## 2021-10-18 ASSESSMENT — PULMONARY FUNCTION TESTS
PIF_VALUE: 30
PIF_VALUE: 24
PIF_VALUE: 19
PIF_VALUE: 0
PIF_VALUE: 20
PIF_VALUE: 19
PIF_VALUE: 18
PIF_VALUE: 19
PIF_VALUE: 0
PIF_VALUE: 3
PIF_VALUE: 19
PIF_VALUE: 24
PIF_VALUE: 22
PIF_VALUE: 22
PIF_VALUE: 20
PIF_VALUE: 22
PIF_VALUE: 20
PIF_VALUE: 20
PIF_VALUE: 19
PIF_VALUE: 19
PIF_VALUE: 23
PIF_VALUE: 4
PIF_VALUE: 20
PIF_VALUE: 20
PIF_VALUE: 21
PIF_VALUE: 0
PIF_VALUE: 23
PIF_VALUE: 20
PIF_VALUE: 26
PIF_VALUE: 22
PIF_VALUE: 11
PIF_VALUE: 10
PIF_VALUE: 19
PIF_VALUE: 24
PIF_VALUE: 21
PIF_VALUE: 19
PIF_VALUE: 20
PIF_VALUE: 24
PIF_VALUE: 22
PIF_VALUE: 19
PIF_VALUE: 21
PIF_VALUE: 21
PIF_VALUE: 24
PIF_VALUE: 19
PIF_VALUE: 23
PIF_VALUE: 19
PIF_VALUE: 7
PIF_VALUE: 20
PIF_VALUE: 22
PIF_VALUE: 19
PIF_VALUE: 26
PIF_VALUE: 20
PIF_VALUE: 21
PIF_VALUE: 10
PIF_VALUE: 21
PIF_VALUE: 20
PIF_VALUE: 23
PIF_VALUE: 19
PIF_VALUE: 19
PIF_VALUE: 17
PIF_VALUE: 22
PIF_VALUE: 2
PIF_VALUE: 19
PIF_VALUE: 20
PIF_VALUE: 19
PIF_VALUE: 19
PIF_VALUE: 20
PIF_VALUE: 24
PIF_VALUE: 19
PIF_VALUE: 19
PIF_VALUE: 20
PIF_VALUE: 20
PIF_VALUE: 10
PIF_VALUE: 24
PIF_VALUE: 19
PIF_VALUE: 11
PIF_VALUE: 0
PIF_VALUE: 10
PIF_VALUE: 19
PIF_VALUE: 19
PIF_VALUE: 20
PIF_VALUE: 1
PIF_VALUE: 10
PIF_VALUE: 20
PIF_VALUE: 19
PIF_VALUE: 21
PIF_VALUE: 20
PIF_VALUE: 19
PIF_VALUE: 20
PIF_VALUE: 19
PIF_VALUE: 20
PIF_VALUE: 21
PIF_VALUE: 20
PIF_VALUE: 22
PIF_VALUE: 19
PIF_VALUE: 8
PIF_VALUE: 19
PIF_VALUE: 6
PIF_VALUE: 20
PIF_VALUE: 21
PIF_VALUE: 20
PIF_VALUE: 24
PIF_VALUE: 3
PIF_VALUE: 24
PIF_VALUE: 19
PIF_VALUE: 20
PIF_VALUE: 21
PIF_VALUE: 11
PIF_VALUE: 10
PIF_VALUE: 12
PIF_VALUE: 19
PIF_VALUE: 22
PIF_VALUE: 20

## 2021-10-18 ASSESSMENT — PAIN SCALES - GENERAL
PAINLEVEL_OUTOF10: 0
PAINLEVEL_OUTOF10: 6
PAINLEVEL_OUTOF10: 7
PAINLEVEL_OUTOF10: 6
PAINLEVEL_OUTOF10: 0

## 2021-10-18 ASSESSMENT — PAIN DESCRIPTION - LOCATION: LOCATION: HIP

## 2021-10-18 NOTE — ANESTHESIA POSTPROCEDURE EVALUATION
Department of Anesthesiology  Postprocedure Note    Patient: Chalino Lerma  MRN: [id number]  YOB: 1940  Date of evaluation: 10/18/2021  Time:  12:32 PM     Procedure Summary     Date: 10/18/21 Room / Location: [address]    Anesthesia Start: 1022 Anesthesia Stop: 4203    Procedures: FEMUR IM NAIL PB INSERTION (Left Hip)      Procedure Not Yet Scheduled Diagnosis: (LEFT HIP  FRACTURE)    Surgeons: Luma Beckford DO Responsible Provider: Soham Alvarado MD    Anesthesia Type: general ASA Status: 3 - Emergent          Anesthesia Type: general    Hanna Phase I:      Hanna Phase II:      Last vitals: Reviewed and per EMR flowsheets.        Anesthesia Post Evaluation    Patient location during evaluation: PACU  Patient participation: complete - patient participated  Level of consciousness: sleepy but conscious  Pain score: 1  Airway patency: patent  Nausea & Vomiting: no nausea and no vomiting  Complications: no  Cardiovascular status: blood pressure returned to baseline  Respiratory status: acceptable  Hydration status: euvolemic

## 2021-10-18 NOTE — OP NOTE
Operative Note      Patient: Courtney Yates  YOB: 1940  MRN: [id number]    Date of Procedure: 10/18/2021    Pre-Op Diagnosis: LEFT HIP  Intertrochanteric fracture    Post-Op Diagnosis: Same       Procedure(s): FEMUR IM NAIL PB INSERTION    Surgeon(s):  Lenny Mackey DO    Assistant:   * No surgical staff found *    Anesthesia: General    Estimated Blood Loss (mL): less than 939     Complications: None    Specimens:   * No specimens in log *    Implants:  Implant Name Type Inv. Item Serial No.  Lot No. LRB No. Used Action   eba one nail long left      [id number] Left 1 Implanted   vi te eba one cephalic screw     [employer] [id number] Left 1 Implanted   eba one cortical screw    [employer] [id number] Left 1 Implanted         Drains:   Urethral Catheter Straight-tip (Active)   Catheter Indications Perioperative use for selected surgical procedures 10/18/21 1227   Site Assessment Pink 10/18/21 1227   Urine Color Yellow 10/18/21 1227   Urine Appearance Clear 10/18/21 1227   Output (mL) 650 mL 10/18/21 1131       Findings: Please see dictated op report    Detailed Description of Procedure:   PROCEDURES PERFORMED:  1.  Closed reduction and intramedullary nail fixation of the left  intertrochanteric hip fracture using CTF 11 mm femoral  nail with 340 mm of length with 90 mm lag screw. 2.  Fluoroscopic guidance interpretation. SURGEON:  Manda Lobo DO    ANESTHESIA:  General    ESTIMATED BLOOD LOSS:  50 mL    INDICATION FOR PROCEDURE:  The patient is an 72-year-old female who  sustained a fall at home landing onto her left hip. The patient was brought to  [facility] where they were diagnosed with a left hip intertrochanteric  fracture. The patient was subsequently admitted for treatment of the hip fracture.   I explained the risks, benefits and possible complications of the procedure  to the patient and after answering all of their questions, consent was obtained   to have the patient undergo the above procedure. The hospitalist did provide preoperative clearance prior to proceeding with  the surgical treatment. OPERATIVE PROCEDURE:  The patient was seen and evaluated in the  preoperative holding area where the left lower extremity was signed. At this point of care,   the patient was turned  over to the anesthesia team who transported them back to the operative  suite. General anesthesia was induced on the fracture table, and when the patient was fully sedated we performed a reduction maneuver with the left lower  extremity held in traction. At this point, a time-out was performed and  all attendants were in agreement. Preoperative antibiotics were  administered. I applied traction and internal rotation to the left leg to perform a closed  reduction to the left hip. I then confirmed anatomic alignment under  fluoroscopy in AP and lateral planes. There was significant comminution of the proximal femur and the fractue did have subtrochanteric extension. Once satisfactory reduction was  obtained, the length of the femur from tip of greater trochanter to the   superior pole the patella was measured using a radiolucent measuring   tool and the nail was measured out to be 340mm. The left lower extremity was prepped and draped in the usual  sterile fashion. I then made a 5 mm longitudinal incision just proximal to the tip of the  greater trochanter and carried sharp dissection down to the greater  trochanter. I then positioned the tip of starting guidepin at the appropriate  Medial aspect point along the peak of the greater trochanter and confirmed the starting  position under fluoroscopy in AP and lateral planes. Once satisfactory  starting position was obtained, I then proceeded the guidepin into the proximal  femur. I then confirmed positioning of the guidewire under fluoroscopy in  the AP and lateral planes.   I then reamed

## 2021-10-18 NOTE — H&P
HISTORY AND PHYSICAL  (Hospitalist, Internal Medicine)  IDENTIFYING INFORMATION   PATIENT:  Misty Crum  MRN:  [id number]  ADMIT DATE: 10/17/2021      CHIEF COMPLAINT   Fall    HISTORY OF PRESENT ILLNESS   Misty Crum is a 80 y.o. female with hypertension, osteoporosis, h/o SVT, paroxysmal A fib on anticoagulation, chronic back pain was brought to ED after having a fall at home. Patient reported that she was kicking off her shoes when she lost her balance and fall on her left side. She did not hit her head or lost consciousness. She started having pain in her left hip. C/o abdominal pain, which resolved after having an episode of emesis during y examination. Denied any other complaints no fever, chills, cough, chest pain, shortness of breath, denied any urinary complaints, constipation or diarrhea. At presentation pt was noted to have /90, HR-67, RR-14, saturating 96% on RA. CBC, BMP within normal range. Rapid COVID negative. CT head, CT C spine- no acute fractures. CT pelvis w/o contrast revealed acute comminuted displaced and angulated left intertrochanteric femoral fracture. Orthopedic surgeon Dr J aCrlos Rangel was consulted from ED. PAST MEDICAL HISTORY PAST SURGICAL HISTORY   hypertension, osteoporosis, h/o SVT, paroxysmal A fib on anticoagulation, chronic back pain  T6 vertebroplasty, cholecystectomy, hernia repair, hysterectomy, tonsillectomy. FAMILY HISTORY SOCIAL HISTORY    reviewed and noncontributory  denied smoking, alcohol or illicit drug use. MEDICATIONS ALLERGIES    atorvastatin, metoprolol succinate, apixaban, calcium-Vit D.  no known drug allergies.        PAST MEDICAL, SURGICAL, FAMILY, and SOCIAL HISTORY         Past Medical History:   Diagnosis Date    Compression fracture of lumbar spine, non-traumatic ([address] 6/2013    Compression fx, thoracic spine (HCC)     T6 - scheduled for vertebroplasty 2015    Degenerative disc disease, lumbar 2013    Environmental allergies     Hx of colonic polyps 2006    Hypertension     Osteoarthritis of lumbar spine     Osteoporosis     PONV (postoperative nausea and vomiting)     \"just with the hernia surgery\"    Spondylolisthesis of lumbar region 2013    Surgical menopause     TIA (transient ischemic attack) 2006    Unspecified cerebral artery occlusion with cerebral infarction     \"had light stroke 6 yrs ago-everything went black and thought I was gone but only lasted about 10 minuts\" no residual now     Past Surgical History:   Procedure Laterality Date    CHOLECYSTECTOMY  age 29's    open    COLONOSCOPY  2010    DILATION AND CURETTAGE OF UTERUS  age31's    FINGER TRIGGER RELEASE Right 2014    HERNIA REPAIR  2011    right ing hernia    HYSTERECTOMY  age 29's    \"took both ovaries\"    TONSILLECTOMY  age 22    VERTEBROPLASTY  8/24/15     Family History   Problem Relation Age of Onset    Heart Attack Mother         Myocardial Infarction- 76    Heart Disease Mother         MI    Diabetes Mother     High Blood Pressure Mother     Diabetes Sister         Type 2    Other Brother         AAA    Heart Disease Brother         MI    Heart Disease Father         MI    Diabetes Sister     Heart Disease Brother         MI     Family Hx of HTN  Family Hx as reviewed above, otherwise non-contributory  Social History     Socioeconomic History    Marital status:      Spouse name: None    Number of children: None    Years of education: None    Highest education level: None   Occupational History    None   Tobacco Use    Smoking status: Former Smoker     Packs/day: 3.00     Years: 2.00     Pack years: 6.00     Quit date: 1959     Years since quittin.9    Smokeless tobacco: Never Used   Substance and Sexual Activity    Alcohol use: No     Comment:        CAFFEINE: 2 - cups of 1/2 diet soda, 1/2 citrus & water mixture daily & tea occasionally.  Drug use: No    Sexual activity: None   Other Topics Concern    None   Social History Narrative    None     Social Determinants of Health     Financial Resource Strain:     Difficulty of Paying Living Expenses:    Food Insecurity:     Worried About Running Out of Food in the Last Year:     [address] in the Last Year:    Transportation Needs:     Lack of Transportation (Medical):  Lack of Transportation (Non-Medical):    Physical Activity:     Days of Exercise per Week:     Minutes of Exercise per Session:    Stress:     Feeling of Stress :    Social Connections:     Frequency of Communication with Friends and Family:     Frequency of Social Gatherings with Friends and Family:     Attends [employer] Services:     Active Member of Clubs or Organizations:     Attends Club or Organization Meetings:     Marital Status:    Intimate Partner Violence:     Fear of Current or Ex-Partner:     Emotionally Abused:     Physically Abused:     Sexually Abused:        MEDICATIONS   Medications Prior to Admission  Not in a hospital admission.     Current Medications  Current Facility-Administered Medications   Medication Dose Route Frequency Provider Last Rate Last Admin    morphine (PF) injection 4 mg  4 mg IntraVENous Q30 Min PRN Meredith Mcgee PA-C   4 mg at 10/18/21 0225     Current Outpatient Medications   Medication Sig Dispense Refill    atorvastatin (LIPITOR) 10 MG tablet Take 1 tablet by mouth daily 90 tablet 1    metoprolol succinate (TOPROL XL) 50 MG extended release tablet Take 1 tablet by mouth daily 90 tablet 1    fluticasone (FLONASE) 50 MCG/ACT nasal spray 2 sprays by Each Nostril route daily 1 Bottle 1    apixaban (ELIQUIS) 5 MG TABS tablet Take 10 mg by mouth daily      Ascorbic Acid (VITAMIN C) 500 MG tablet Take 1,000 mg by mouth daily      calcium-vitamin D (OSCAL-500) 500-200 MG-UNIT per tablet Take 1 tablet by mouth daily. Allergies  No Known Allergies    REVIEW OF SYSTEMS   10 point ROS conducted and pertinent positives and negatives as per HPI. PHYSICAL EXAM     Wt Readings from Last 3 Encounters:   10/17/21 124 lb 6.4 oz (56.4 kg)   09/09/21 124 lb 6.4 oz (56.4 kg)   03/24/21 132 lb 3.2 oz (60 kg)       Blood pressure 129/72, pulse 78, resp. rate 20, height 4' 10\" (1.473 m), weight 124 lb 6.4 oz (56.4 kg), SpO2 98 %, not currently breastfeeding. GEN  -Awake, alert, NAD.   EYES   -PERRL. HENT  -MM are moist.   RESP  -LS CTA equal bilat, no wheezes, rales or rhonchi. Symmetric chest movement. No respiratory distress noted. C/V  -S1/S2 auscultated. RRR without appreciable M/R/G. No JVD or carotid bruits. Peripheral pulses equal bilaterally and palpable. No peripheral edema. No reproducible chest wall tenderness. GI  -Abdomen is soft, non-distended, no significant tenderness. No masses or guarding. + BS in all quadrants. Rectal exam deferred.   -No CVA tenderness. Nguyen catheter is not present. MS  -left lower extremity-able to wiggle her toes, sensation intact. SKIN  -Normal coloration, warm, dry. NEURO  - Awake, alert, oriented x 3,  No focal deficits. PSYC  - Appropriate affect. LABS AND IMAGING     Results for Varghese Collado (MRN [id number]) as of 10/18/2021 06:21   Ref.  Range 10/17/2021 20:30   Sodium Latest Ref Range: 135 - 145 MMOL/L 137   Potassium Latest Ref Range: 3.5 - 5.1 MMOL/L 3.6   Chloride Latest Ref Range: 99 - 110 mMol/L 104   CO2 Latest Ref Range: 21 - 32 MMOL/L 25   BUN Latest Ref Range: 6 - 23 MG/DL 23   Creatinine Latest Ref Range: 0.6 - 1.1 MG/DL 0.5 (L)   Anion Gap Latest Ref Range: 4 - 16  8   GFR Non- Latest Ref Range: >60 mL/min/1.73m2 >60   GFR African American Latest Ref Range: >60 mL/min/1.73m2 >60   Glucose Latest Ref Range: 70 - 99 MG/ (H)   Calcium Latest Ref Range: 8.3 - 10.6 MG/DL 8.9   WBC Latest Ref Range: 4.0 - 10.5 K/CU MM 7.3   RBC Latest Ref Range: 4.2 - 5.4 M/CU MM 4.15 (L)   Hemoglobin Quant Latest Ref Range: 12.5 - 16.0 GM/DL 13.3   Hematocrit Latest Ref Range: 37 - 47 % 41.7   MCV Latest Ref Range: 78 - 100 .5 (H)   MCH Latest Ref Range: 27 - 31 PG 32.0 (H)   MCHC Latest Ref Range: 32.0 - 36.0 % 31.9 (L)   MPV Latest Ref Range: 7.5 - 11.1 FL 10.9   RDW Latest Ref Range: 11.7 - 14.9 % 12.2   Platelet Count Latest Ref Range: 140 - 440 K/CU    Lymphocyte % Latest Ref Range: 24 - 44 % 37.2   Monocytes % Latest Ref Range: 0 - 4 % 6.3 (H)   Eosinophils % Latest Ref Range: 0 - 3 % 2.0   Basophils % Latest Ref Range: 0 - 1 % 0.4   Lymphocytes Absolute Latest Units: K/CU MM 2.7   Monocytes Absolute Latest Units: K/CU MM 0.5   Eosinophils Absolute Latest Units: K/CU MM 0.2   Basophils Absolute Latest Units: K/CU MM 0.0   Differential Type Unknown AUTOMATED DIFFERENTIAL   Segs Relative Latest Ref Range: 36 - 66 % 53.7   Segs Absolute Latest Units: K/CU MM 3.9   Nucleated RBC % Latest Units: % 0.0   Immature Neutrophil % Latest Ref Range: 0 - 0.43 % 0.4   Total Immature Neutrophil Latest Units: K/CU MM 0.03   Total Nucleated RBC Latest Units: K/CU MM 0.0     Results for Jarad Barton (MRN [id number]) as of 10/18/2021 06:21   Ref. Range 10/18/2021 02:03   SARS-CoV-2, NAAT Latest Ref Range: NOT DETECTED  NOT DETECTED     Recent Imaging    CT CERVICAL SPINE WO CONTRAST [[id number]] Collected: 10/17/21 2126      Order Status: Completed Updated: 10/17/21 2131     Narrative:       EXAMINATION:   CT OF THE CERVICAL SPINE WITHOUT CONTRAST 10/17/2021 8:59 pm     TECHNIQUE:   CT of the cervical spine was performed without the administration of   intravenous contrast. Multiplanar reformatted images are provided for review. Dose modulation, iterative reconstruction, and/or weight based adjustment of   the mA/kV was utilized to reduce the radiation dose to as low as reasonably   achievable. COMPARISON:   None.      HISTORY: ORDERING SYSTEM PROVIDED HISTORY: trauma   TECHNOLOGIST PROVIDED HISTORY:   Reason for exam:->trauma   Decision Support Exception - unselect if not a suspected or confirmed   emergency medical condition->Emergency Medical Condition (MA)   Reason for Exam: neck pain   Acuity: Acute   Type of Exam: Initial   Mechanism of Injury: fall     FINDINGS:   BONES/ALIGNMENT: The craniocervical and atlantoaxial junctions are intact. The odontoid process is intact.  Grade 1 anterolisthesis of C3 on C4 and C4   on C5 likely chronic.  Normal alignment is otherwise maintained.  The   vertebral body heights are preserved.  No fracture or other acute osseous   abnormality. DEGENERATIVE CHANGES: Multilevel degenerative changes. SOFT TISSUES: There is no prevertebral soft tissue swelling.      Impression:       No acute abnormality of the cervical spine.      CT HEAD WO CONTRAST [[id number]] Collected: 10/17/21 2124     Order Status: Completed Updated: 10/17/21 2128     Narrative:       EXAMINATION:   CT OF THE HEAD WITHOUT CONTRAST  10/17/2021 8:59 pm     TECHNIQUE:   CT of the head was performed without the administration of intravenous   contrast. Dose modulation, iterative reconstruction, and/or weight based   adjustment of the mA/kV was utilized to reduce the radiation dose to as low   as reasonably achievable. COMPARISON:   None. HISTORY:   ORDERING SYSTEM PROVIDED HISTORY: head pain   TECHNOLOGIST PROVIDED HISTORY:   Has a \"code stroke\" or \"stroke alert\" been called? ->No   Reason for exam:->head pain   Decision Support Exception - unselect if not a suspected or confirmed   emergency medical condition->Emergency Medical Condition (MA)   Reason for Exam: head pain   Acuity: Acute   Type of Exam: Initial   Mechanism of Injury: fall     FINDINGS:   BRAIN/VENTRICLES: There is no acute intracranial hemorrhage, mass effect or   midline shift.  No abnormal extra-axial fluid collection.  The gray-white differentiation is maintained without evidence of an acute infarct. Jesús Lupe is   no evidence of hydrocephalus. Moderate generalized parenchymal volume loss   and chronic periventricular white matter hypoattenuation are seen. ORBITS: The visualized portion of the orbits demonstrate no acute abnormality. SINUSES: The visualized paranasal sinuses and mastoid air cells demonstrate   no acute abnormality. SOFT TISSUES/SKULL:  No acute abnormality of the visualized skull or soft   tissues.      Impression:       No acute intracranial abnormality.      CT PELVIS WO CONTRAST Additional Contrast? None [[id number]] Collected: 10/17/21 2117     Order Status: Completed Updated: 10/17/21 2122     Narrative:       EXAMINATION:   CT OF THE PELVIS WITHOUT CONTRAST 10/17/2021 9:02 pm     TECHNIQUE:   CT of the pelvis was performed without the administration of intravenous   contrast.  Multiplanar reformatted images are provided for review. Adjustment of mA and/or kV according to patient size was utilized. Annemarie Sherry   modulation, iterative reconstruction, and/or weight based adjustment of the   mA/kV was utilized to reduce the radiation dose to as low as reasonably   achievable. COMPARISON:   None. HISTORY:   ORDERING SYSTEM PROVIDED HISTORY: trauma   TECHNOLOGIST PROVIDED HISTORY:   Reason for exam:->trauma   Additional Contrast?->None   Reason for Exam: left hip pain   Acuity: Acute   Type of Exam: Initial     FINDINGS:   Chronic bilateral L5 pars defects and grade 2 anterolisthesis of L5 on S1. Moderate chronic L5 compression fracture.  Moderate to severe L5-S1   degenerative disc disease.  Multilevel bilateral facet arthropathy of the   lower lumbar spine.  Mild degenerative changes of the bilateral sacroiliac   joints and pubic symphysis.  Mild bilateral hip degenerative changes. There is an acute comminuted displaced and angulated left intertrochanteric   femoral fracture.      No other fracture identified.  No dislocation.  No suspicious lytic or   blastic osseous lesion. The urinary bladder is normal in appearance.  Status post hysterectomy.  The   visualized bowel is unremarkable.  No free fluid.  No pelvic or inguinal   lymphadenopathy.      Impression:       Acute comminuted displaced and angulated left intertrochanteric femoral   fracture.      CT PELVIS W CONTRAST Additional Contrast? None [[id number]]      Order Status: Canceled            Relevant labs and imaging reviewed    ASSESSMENT AND PLAN     #. Acute comminuted displaced and angulated left intertrochanteric femur fracture: S/p fall  -Keep patient n.p.o.   -IV fluids, analgesics, antiemetics as needed  -Orthopedic surgeon-Dr. Segundo Hollins consulted from ED. #. Hypertension-continue metoprolol succinate    #. Hyperlipidemia-continue atorvastatin    #. Osteoporosis-  Patient on calcium-vitamin D     #. h/o SVT, paroxysmal A fib on anticoagulation   -continue metoprolol succinate, hold Eliquis    #. chronic back pain    DVT Prophylaxis: Hold chemical prophylaxis for anticipated procedure. Resume Eliquis when appropriate. GI Prophylaxis: Not indicated  Code Status: FULL.       Case d/w ED physician      Adeola Rosa MD  Hospitalist, Internal Medicine  10/18/2021 at 2:43 AM

## 2021-10-18 NOTE — PROGRESS NOTES
1227: Arrived to PACU from OR. Monitors applied, alarms on. Report obtained from [facility] and Yves SILVER.  1306:Dr. Amber Giraldo spoke with  over phone regarding surgery. Medicated for nausea. Vomited small amount thick green emesis. Stated she felt better after bringing that up.  1340: X-rays taken. 1355: Turned and repositioned in bed, warm blankets on, heels off bed. Vomited small amount thick green emesis. Denies pain. 1400: Phase 1 recovery complete, placed in holding pattern while waiting for room assignment. Sonya Valdes (granddaughter) called and update given. 1410: Dozing. 1432: Transported to room 1114.  called with room number.

## 2021-10-18 NOTE — ED PROVIDER NOTES
Triage Chief Complaint:   Fall and Hip Pain (left hip)    [place]:  Shayna Morrison is a 80 y.o. female that presents today complaining of L sided hip pain. Context is, pt tried to kick off her shoes and tripped mechanically falling and landing on the L hip. Pain 10/10. Cannot ambulate. Came by ems. States did not syncopize or hit head. No headahc,e neck pain, chest or abdominal pain. No extremity weakness or pain aside fro  Hip. No paresthesias. Pain is ranked 10/10. Patient admits  To no radiation. Pain is made worse with movement and palpation. Pain relieved some with rest.     ROS:  At least 06 systems reviewed and otherwise negative except as in the [address]     Past Medical History:   Diagnosis Date    Compression fracture of lumbar spine, non-traumatic ([facility] [address] 2013    Compression fx, thoracic spine (HCC)     T6 - scheduled for vertebroplasty 2015    Degenerative disc disease, lumbar 2013    Environmental allergies     Hx of colonic polyps 2006    Hypertension     Osteoarthritis of lumbar spine     Osteoporosis     PONV (postoperative nausea and vomiting)     \"just with the hernia surgery\"    Spondylolisthesis of lumbar region 2013    Surgical menopause     TIA (transient ischemic attack) 2006    Unspecified cerebral artery occlusion with cerebral infarction     \"had light stroke 6 yrs ago-everything went black and thought I was gone but only lasted about 10 minuts\" no residual now     Past Surgical History:   Procedure Laterality Date    CHOLECYSTECTOMY  age 29's    open    COLONOSCOPY  2010    DILATION AND CURETTAGE OF UTERUS  age29's    FINGER TRIGGER RELEASE Right 2014    HERNIA REPAIR      right ing hernia    HYSTERECTOMY  age 29's    \"took both ovaries\"    TONSILLECTOMY  age 22    VERTEBROPLASTY  8/24/15     Family History   Problem Relation Age of Onset    Heart Attack Mother         Myocardial Infarction- 76    Heart Disease Mother         MI  Diabetes Mother     High Blood Pressure Mother     Diabetes Sister         Type 2    Other Brother         AAA    Heart Disease Brother         MI    Heart Disease Father         MI    Diabetes Sister     Heart Disease Brother         MI     Social History     Socioeconomic History    Marital status:      Spouse name: Not on file    Number of children: Not on file    Years of education: Not on file    Highest education level: Not on file   Occupational History    Not on file   Tobacco Use    Smoking status: Former Smoker     Packs/day: 3.00     Years: 2.00     Pack years: 6.00     Quit date: 1959     Years since quittin.9    Smokeless tobacco: Never Used   Substance and Sexual Activity    Alcohol use: No     Comment:        CAFFEINE: 2 - cups of 1/2 diet soda, 1/2 citrus & water mixture daily & tea occasionally.  Drug use: No    Sexual activity: Not on file   Other Topics Concern    Not on file   Social History Narrative    Not on file     Social Determinants of Health     Financial Resource Strain:     Difficulty of Paying Living Expenses:    Food Insecurity:     Worried About Running Out of Food in the Last Year:     [address] in the Last Year:    Transportation Needs:     Lack of Transportation (Medical):      Lack of Transportation (Non-Medical):    Physical Activity:     Days of Exercise per Week:     Minutes of Exercise per Session:    Stress:     Feeling of Stress :    Social Connections:     Frequency of Communication with Friends and Family:     Frequency of Social Gatherings with Friends and Family:     Attends [employer] Services:     Active Member of Clubs or Organizations:     Attends Club or Organization Meetings:     Marital Status:    Intimate Partner Violence:     Fear of Current or Ex-Partner:     Emotionally Abused:     Physically Abused:     Sexually Abused:      Current Facility-Administered Medications   Medication Dose Route Frequency Provider Last Rate Last Admin    morphine (PF) injection 4 mg  4 mg IntraVENous Q30 Min PRN Celeste FELIPE Lerner   4 mg at 10/17/21 2221     Current Outpatient Medications   Medication Sig Dispense Refill    atorvastatin (LIPITOR) 10 MG tablet Take 1 tablet by mouth daily 90 tablet 1    metoprolol succinate (TOPROL XL) 50 MG extended release tablet Take 1 tablet by mouth daily 90 tablet 1    fluticasone (FLONASE) 50 MCG/ACT nasal spray 2 sprays by Each Nostril route daily 1 Bottle 1    apixaban (ELIQUIS) 5 MG TABS tablet Take 10 mg by mouth daily      Ascorbic Acid (VITAMIN C) 500 MG tablet Take 1,000 mg by mouth daily      calcium-vitamin D (OSCAL-500) 500-200 MG-UNIT per tablet Take 1 tablet by mouth daily. No Known Allergies    Nursing Notes Reviewed    Physical Exam:  ED Triage Vitals [10/17/21 2024]   Enc Vitals Group      BP (!) 181/90      Pulse 67      Resp 14      Temp       Temp Source Oral      SpO2 96 %      Weight 124 lb 6.4 oz (56.4 kg)      Height 4' 10\" (1.473 m)      Head Circumference       Peak Flow       Pain Score       Pain Loc       Pain Edu? Excl. in [address]? GENERAL APPEARANCE: Awake and alert. Cooperative. No acute distress. HEAD: Normocephalic. Atraumatic. NECK: Full ROM  LUNGS: Respirations unlabored. ABDOMEN: Soft. Non-tender. No guarding or rebound. No organomegaly. No palpable masses  MUSCULOSKELETAL: No acute deformities. Legs are unequal  in length shortening and rotation of the L hip. There is unilateral tenderness to palpation to the iliac crest.No obvious deformities. Palpable cords no visible varicosities no breaks in skin popliteal, dorsalis pedis, posterior talus pulse 2+. No sensation is that capillary refill less than 2 seconds. Compartments are soft. SKIN: Warm and dry. No rash, No erythema, No edema. No ecchymoses. NEUROLOGICAL: No gross facial drooping. Moves all 4 extremities spontaneously. PSYCHIATRIC: Normal mood.     I have reviewed and interpreted all of the currently available lab results from this visit (if applicable):  Results for orders placed or performed during the hospital encounter of 10/17/21   CBC auto diff   Result Value Ref Range    WBC 7.3 4.0 - 10.5 K/CU MM    RBC 4.15 (L) 4.2 - 5.4 M/CU MM    Hemoglobin 13.3 12.5 - 16.0 GM/DL    Hematocrit 41.7 37 - 47 %    .5 (H) 78 - 100 FL    MCH 32.0 (H) 27 - 31 PG    MCHC 31.9 (L) 32.0 - 36.0 %    RDW 12.2 11.7 - 14.9 %    Platelets 857 395 - 014 K/CU MM    MPV 10.9 7.5 - 11.1 FL    Differential Type AUTOMATED DIFFERENTIAL     Segs Relative 53.7 36 - 66 %    Lymphocytes % 37.2 24 - 44 %    Monocytes % 6.3 (H) 0 - 4 %    Eosinophils % 2.0 0 - 3 %    Basophils % 0.4 0 - 1 %    Segs Absolute 3.9 K/CU MM    Lymphocytes Absolute 2.7 K/CU MM    Monocytes Absolute 0.5 K/CU MM    Eosinophils Absolute 0.2 K/CU MM    Basophils Absolute 0.0 K/CU MM    Nucleated RBC % 0.0 %    Total Nucleated RBC 0.0 K/CU MM    Total Immature Neutrophil 0.03 K/CU MM    Immature Neutrophil % 0.4 0 - 0.43 %   BMP   Result Value Ref Range    Sodium 137 135 - 145 MMOL/L    Potassium 3.6 3.5 - 5.1 MMOL/L    Chloride 104 99 - 110 mMol/L    CO2 25 21 - 32 MMOL/L    Anion Gap 8 4 - 16    BUN 23 6 - 23 MG/DL    CREATININE 0.5 (L) 0.6 - 1.1 MG/DL    Glucose 139 (H) 70 - 99 MG/DL    Calcium 8.9 8.3 - 10.6 MG/DL    GFR Non-African American >60 >60 mL/min/1.73m2    GFR African American >60 >60 mL/min/1.73m2      Radiographs (if obtained):  [] The following radiograph was interpreted by myself in the absence of a radiologist:   [] Radiologist's Report Reviewed:  CT PELVIS WO CONTRAST Additional Contrast? None   Final Result   Acute comminuted displaced and angulated left intertrochanteric femoral   fracture. CT CERVICAL SPINE WO CONTRAST   Final Result   No acute abnormality of the cervical spine. CT HEAD WO CONTRAST   Final Result   No acute intracranial abnormality.              EKG (if obtained):   Please See Note of attending physician for EKG interpretation. Chart review shows recent radiograph(s):  No results found. MDM:   Neurovascularly intact. Patient presents today with signs and symptoms that are congruent with musculoskeletal fx of the hip. On-call physician orthopedist and hospitalist. Was consulted regarding patient. After thorough discussion regarding patient's history, physical exam.  laboratory values, radiographic evidence (if applicable  theymyself as well as my attending physician agreed Given the patient's presenting concerns, medical history and clinical findings, the patient will be admitted at this time to undergo further evaluation and disposition. . During patient's entire stay in the ED patient remained stable and comfortable. Analgesia is well-controlled. Patient will be admitted for all information regarding ongoing management and care of patient please see note of Admitting physician. All EKG interpretations are performed by Attending physician    I independently manage patient today in the ED  /72   Pulse 78   Resp 20   Ht 4' 10\" (1.473 m)   Wt 124 lb 6.4 oz (56.4 kg)   LMP  (LMP Unknown)   SpO2 98%   BMI 26.00 kg/m²       Clinical Impression:  1. Closed fracture of hip, unspecified laterality, initial encounter [facility])        Disposition referral (if applicable):  No follow-up provider specified. Disposition medications (if applicable):  New Prescriptions    No medications on file       Comment: Please note this report has been produced using speech recognition software and may contain errors related to that system including errors in grammar, punctuation, and spelling, as well as words and phrases that may be inappropriate. If there are any questions or concerns please feel free to contact the dictating provider for clarification.     Freida Ruiz, [address] [place], [place]  10/18/21 8207

## 2021-10-18 NOTE — ED NOTES
Pt presents to ED s/p fall with c/o left hip injury. Extremity is shortened and externally rotated. PMS intact. Pt denies head injury from fall, no LOC, gcs 15 on arrival.  Pt is taking elaquis. Given 50mcg fentanyl per EMS.        Darline Cuevas RN  10/17/21 2030

## 2021-10-18 NOTE — ANESTHESIA PRE PROCEDURE
Department of Anesthesiology  Preprocedure Note       Name:  Laine Roberson   Age:  80 y.o.  :  1940                                          MRN:  [id number]         Date:  10/18/2021      Surgeon: Lino Prather):  Bonita Villa DO    Procedure: Procedure(s): FEMUR IM NAIL PB INSERTION    Medications prior to admission:   Prior to Admission medications    Medication Sig Start Date End Date Taking? Authorizing Provider   atorvastatin (LIPITOR) 10 MG tablet Take 1 tablet by mouth daily 21   Jada Schmitz MD   metoprolol succinate (TOPROL XL) 50 MG extended release tablet Take 1 tablet by mouth daily 21   Jada Schmitz MD   fluticasone [facility]) 50 MCG/ACT nasal spray 2 sprays by Each Nostril route daily 21   Jada Schmitz MD   apixaban (ELIQUIS) 5 MG TABS tablet Take 10 mg by mouth daily    Emperatriz Martin MD   Ascorbic Acid (VITAMIN C) 500 MG tablet Take 1,000 mg by mouth daily    Historical Provider, MD   calcium-vitamin D (OSCAL-500) 500-200 MG-UNIT per tablet Take 1 tablet by mouth daily.     Historical Provider, MD       Current medications:    Current Facility-Administered Medications   Medication Dose Route Frequency Provider Last Rate Last Admin    sodium chloride flush 0.9 % injection 5-40 mL  5-40 mL IntraVENous 2 times per day Gaby Maria MD        sodium chloride flush 0.9 % injection 5-40 mL  5-40 mL IntraVENous PRN Gaby Maria MD        0.9 % sodium chloride infusion  25 mL IntraVENous PRN Gaby Maria MD        ondansetron (ZOFRAN-ODT) disintegrating tablet 4 mg  4 mg Oral Q8H PRN Gaby Maria MD        Or    ondansetron [facility]) injection 4 mg  4 mg IntraVENous Q6H PRN Gaby Maria MD   4 mg at 10/18/21 0504    polyethylene glycol (GLYCOLAX) packet 17 g  17 g Oral Daily PRN Gaby Maria MD        acetaminophen (TYLENOL) tablet 650 mg  650 mg Oral Q6H PRN Gaby Maria MD        Or    acetaminophen (TYLENOL) suppository 650 mg  650 mg Rectal Q6H PRN Victorino Martinez MD        atorvastatin (LIPITOR) tablet 10 mg  10 mg Oral Daily Victorino Martinez MD        calcium-cholecalciferol 500-200 MG-UNIT per tablet 1 tablet  1 tablet Oral Daily Victorino Martinez MD        metoprolol succinate (TOPROL XL) extended release tablet 50 mg  50 mg Oral Daily Victorino Martinez MD        morphine (PF) injection 4 mg  4 mg IntraVENous Q30 Min PRN Treasure Gandhi PA-C   4 mg at 10/18/21 0505     Current Outpatient Medications   Medication Sig Dispense Refill    atorvastatin (LIPITOR) 10 MG tablet Take 1 tablet by mouth daily 90 tablet 1    metoprolol succinate (TOPROL XL) 50 MG extended release tablet Take 1 tablet by mouth daily 90 tablet 1    fluticasone (FLONASE) 50 MCG/ACT nasal spray 2 sprays by Each Nostril route daily 1 Bottle 1    apixaban (ELIQUIS) 5 MG TABS tablet Take 10 mg by mouth daily      Ascorbic Acid (VITAMIN C) 500 MG tablet Take 1,000 mg by mouth daily      calcium-vitamin D (OSCAL-500) 500-200 MG-UNIT per tablet Take 1 tablet by mouth daily.          Allergies:  No Known Allergies    Problem List:    Patient Active Problem List   Diagnosis Code    Essential hypertension I10    Osteoporosis M81.0    Hx of colonic polyps Z86.010    Degenerative disc disease, lumbar M51.36    Spondylolisthesis of lumbar region M43.16    Osteoarthritis of cervical spine M47.812    SVT (supraventricular tachycardia) (HCC) I47.1    Chronic left-sided low back pain with left-sided sciatica M54.42, G89.29    Nonrheumatic aortic valve insufficiency I35.1    Mitral regurgitation I34.0    History of compression fracture of spine Z87.81    Age-related osteoporosis without current pathological fracture M81.0    Sepsis due to pneumonia ([facility] [address] J18.9, A41.9    Intractable nausea and vomiting R11.2    Anticoagulated Z79.01    Paroxysmal atrial fibrillation (HCC) I48.0    Intertrochanteric fracture of left femur, closed, initial encounter ([facility] [address] S72.142A       Past Medical History:        Diagnosis Date    Compression fracture of lumbar spine, non-traumatic ([facility] [address] 2013    Compression fx, thoracic spine (HCC)     T6 - scheduled for vertebroplasty 2015    Degenerative disc disease, lumbar 2013    Environmental allergies     Hx of colonic polyps 2006    Hypertension     Osteoarthritis of lumbar spine     Osteoporosis     PONV (postoperative nausea and vomiting)     \"just with the hernia surgery\"    Spondylolisthesis of lumbar region 2013    Surgical menopause     TIA (transient ischemic attack) 2006    Unspecified cerebral artery occlusion with cerebral infarction     \"had light stroke 6 yrs ago-everything went black and thought I was gone but only lasted about 10 minuts\" no residual now       Past Surgical History:        Procedure Laterality Date    CHOLECYSTECTOMY  age 29's    open    COLONOSCOPY  2010    DILATION AND CURETTAGE OF UTERUS  age31's    FINGER TRIGGER RELEASE Right 2014    HERNIA REPAIR  2011    right ing hernia    HYSTERECTOMY  age 29's    \"took both ovaries\"    TONSILLECTOMY  age 22    VERTEBROPLASTY  8/24/15       Social History:    Social History     Tobacco Use    Smoking status: Former Smoker     Packs/day: 3.00     Years: 2.00     Pack years: 6.00     Quit date: 1959     Years since quittin.9    Smokeless tobacco: Never Used   Substance Use Topics    Alcohol use: No     Comment:        CAFFEINE: 2 - cups of 1/2 diet soda, 1/2 citrus & water mixture daily & tea occasionally.                                 Counseling given: Not Answered      Vital Signs (Current):   Vitals:    10/17/21 2024 10/17/21 2221 10/18/21 0015 10/18/21 0018   BP: (!) 181/90  129/72    Pulse: 67 95 78 78   Resp: 14 16 20    TempSrc: Oral      SpO2: 96% 96% 98%    Weight: 124 lb 6.4 oz (56.4 kg)      Height: 4' 10\" (1.473 m) BP Readings from Last 3 Encounters:   10/18/21 129/72   09/09/21 120/78   03/24/21 118/60       NPO Status:                                                                                 BMI:   Wt Readings from Last 3 Encounters:   10/17/21 124 lb 6.4 oz (56.4 kg)   09/09/21 124 lb 6.4 oz (56.4 kg)   03/24/21 132 lb 3.2 oz (60 kg)     Body mass index is 26 kg/m². CBC:   Lab Results   Component Value Date    WBC 8.9 10/18/2021    RBC 3.70 10/18/2021    HGB 11.9 10/18/2021    HCT 36.3 10/18/2021    MCV 98.1 10/18/2021    RDW 12.2 10/18/2021     10/18/2021       CMP:   Lab Results   Component Value Date     10/18/2021    K 4.0 10/18/2021     10/18/2021    CO2 24 10/18/2021    BUN 21 10/18/2021    CREATININE 0.4 10/18/2021    GFRAA >60 10/18/2021    GFRAA >60 03/13/2013    AGRATIO 2.0 03/24/2021    LABGLOM >60 10/18/2021    GLUCOSE 170 10/18/2021    PROT 6.6 03/24/2021    CALCIUM 8.4 10/18/2021    BILITOT 0.8 03/24/2021    ALKPHOS 59 03/24/2021    AST 17 03/24/2021    ALT 13 03/24/2021       POC Tests: No results for input(s): POCGLU, POCNA, POCK, POCCL, POCBUN, POCHEMO, POCHCT in the last 72 hours. Coags:   Lab Results   Component Value Date    PROTIME 14.0 10/18/2021    INR 1.08 10/18/2021    APTT 24.7 08/24/2015       HCG (If Applicable): No results found for: PREGTESTUR, PREGSERUM, HCG, HCGQUANT     ABGs: No results found for: PHART, PO2ART, XOJ5EXV, NDK2KPW, BEART, G4HKNOAY     Type & Screen (If Applicable):  No results found for: LABABO, LABRH    Drug/Infectious Status (If Applicable):  No results found for: HIV, HEPCAB    COVID-19 Screening (If Applicable):   Lab Results   Component Value Date    COVID19 NOT DETECTED 10/18/2021    COVID19 NOT DETECTED 08/26/2021           Anesthesia Evaluation  Patient summary reviewed   history of anesthetic complications: PONV.   Airway: Mallampati: I        Dental:    (+) edentulous      Pulmonary: breath sounds clear to auscultation  (+) pneumonia: resolved,                             Cardiovascular:    (+) hypertension:, valvular problems/murmurs: AI and MR,       ECG reviewed  Rhythm: regular                   ROS comment: Poor data quality, interpretation may be adversely affected   Normal sinus rhythm   Possible Left atrial enlargement   Left axis deviation   Abnormal ECG   When compared with ECG of 20-MAR-2017 15:58,   No significant change was found   Confirmed by Braxton Baptiste MD, Rissa Delaney ([id number]) on 5/10/2018 9:36:14 AM      Neuro/Psych:   (+) CVA:, neuromuscular disease:, TIA,             GI/Hepatic/Renal:             Endo/Other:    (+) blood dyscrasia: anticoagulation therapy, arthritis: OA., .                 Abdominal:             Vascular: Other Findings:           Anesthesia Plan      general     ASA 3 - emergent     (  Ld eliquis 10/16  covid - 10/17/21)  Induction: intravenous. MIPS: Postoperative opioids intended and Prophylactic antiemetics administered. Anesthetic plan and risks discussed with patient. Plan discussed with CRNA.     Attending anesthesiologist reviewed and agrees with Preprocedure content          MOSHE Johnson - CRNA   10/18/2021

## 2021-10-18 NOTE — PROGRESS NOTES
PROT 6.6 03/24/2021    LABALBU 4.4 03/24/2021    CALCIUM 8.4 10/18/2021    BILITOT 0.8 03/24/2021    ALKPHOS 59 03/24/2021    AST 17 03/24/2021    ALT 13 03/24/2021     No results for input(s): TROPONINT in the last 72 hours.   Lab Results   Component Value Date    TSHHS 0.846 05/08/2018    TSHHS 0.889 05/08/2018         sodium chloride      sodium chloride      sodium chloride      sodium chloride        sodium chloride flush  5-40 mL IntraVENous 2 times per day    atorvastatin  10 mg Oral Daily    calcium-cholecalciferol  1 tablet Oral Daily    metoprolol succinate  50 mg Oral Daily    sodium chloride flush  5-40 mL IntraVENous 2 times per day    [START ON 10/19/2021] enoxaparin  40 mg SubCUTAneous Daily    sodium chloride flush  5-40 mL IntraVENous 2 times per day    sennosides-docusate sodium  1 tablet Oral BID    ceFAZolin (ANCEF) IVPB  2,000 mg IntraVENous Q8H         Assessment:       Patient Active Problem List    Diagnosis Date Noted    Essential hypertension 12/11/2011    Intertrochanteric fracture of left femur, closed, initial encounter ([facility] [address] 10/18/2021    Anticoagulated 05/11/2018    Paroxysmal atrial fibrillation ([facility] [address] 05/11/2018    Sepsis due to pneumonia ([facility] [address] 05/08/2018    Intractable nausea and vomiting 05/08/2018    Age-related osteoporosis without current pathological fracture 02/13/2018    History of compression fracture of spine 01/10/2018    Nonrheumatic aortic valve insufficiency 03/01/2017    Mitral regurgitation 03/01/2017    SVT (supraventricular tachycardia) ([facility] [address] 02/06/2017    Chronic left-sided low back pain with left-sided sciatica 02/06/2017    Osteoarthritis of cervical spine 11/11/2015    Degenerative disc disease, lumbar 06/26/2013    Spondylolisthesis of lumbar region 06/26/2013    Osteoporosis 12/11/2011    Hx of colonic polyps 07/01/2006       Plan:     Problems being addressed this admission:   Right intertrochanteric femoral fracture s/p ORIF 10/18/2021  10/18/2021-she just came back from surgery has had open reduction internal fixation done further recommendations as per orthopedics. Orthopedic surgery notes not available at present    HTN  10/18/2021 her blood pressure is 103/58, on Toprol-XL 50 mg once a day we will continue    HLD  10/18/2021-on Lipitor 10 mg once a day we will continue check liver function and lipids. PAF  10/18/2021-patient was on Eliquis before surgery will await orthopedic recommendations before restarting continue to monitor. Consultants:  Orthopedics    General Orders:  Repeat basic labs again in am.  I have explained to the patient and discussed with him/her the treatment plan. The above chart was generated partly using Dragon dictation system, it may contain dictation errors given the limitations of this technology.      Leslie Ortiz MD, [address]

## 2021-10-18 NOTE — CONSULTS
Orthopaedic Consult    Patient Name: Sajan Petty   ([birth year])  MRN   [id number]   Today's date:  10/18/2021    CHIEF COMPLAINT:   Left hip pain    HISTORY OF PRESENT ILLNESS:      The patient is a 80 y.o. female  who presents to the ER after a fall at home. Patient was attempting to put on her shoes and fell over onto her left side had immediate left hip pain. She was unable to ambulate due to the pain was brought to the emergency room for further evaluation. X-rays were done in the ER showing a displaced left intertrochanteric femur fracture. She has remained n.p.o. since Saturday and has not taken her blood thinner since Saturday morning as well. After the fall the patient was unable to ambulate and the pain was sudden, sharp and rated 10/10. The patient denied any chest pain, shortness of breath or syncopal episode prior to the fall. Prior to the fall, the patient ambulated without assistive device. She denies any prior left hip pain or surgery. She has no other areas of complaint at this time.       Past Medical History         Diagnosis Date    Compression fracture of lumbar spine, non-traumatic ([facility] [address] 6/2013    Compression fx, thoracic spine (HCC)     T6 - scheduled for vertebroplasty 8/24/2015    Degenerative disc disease, lumbar 6/26/2013    Environmental allergies     Hx of colonic polyps 7/2006    Hypertension 2006    Osteoarthritis of lumbar spine     Osteoporosis     PONV (postoperative nausea and vomiting)     \"just with the hernia surgery\"    Spondylolisthesis of lumbar region 6/26/2013    Surgical menopause     TIA (transient ischemic attack) 1/2006    Unspecified cerebral artery occlusion with cerebral infarction     \"had light stroke 6 yrs ago-everything went black and thought I was gone but only lasted about 10 minuts\" no residual now       Past Surgical History         Procedure Laterality Date    CHOLECYSTECTOMY  age 29's    open    COLONOSCOPY  12/2010    DILATION AND CURETTAGE OF UTERUS  age29's    FINGER TRIGGER RELEASE Right 2014    HERNIA REPAIR  2011    right ing hernia    HYSTERECTOMY  age 29's    \"took both ovaries\"    TONSILLECTOMY  age 22    VERTEBROPLASTY  8/24/15       Medications Prior to Admission:     Prior to Admission medications    Medication Sig Start Date End Date Taking? Authorizing Provider   atorvastatin (LIPITOR) 10 MG tablet Take 1 tablet by mouth daily 21   Blaze Morrison MD   metoprolol succinate (TOPROL XL) 50 MG extended release tablet Take 1 tablet by mouth daily 21   Blaze Morrison MD   fluticasone [facility]) 50 MCG/ACT nasal spray 2 sprays by Each Nostril route daily 21   Blaze Morrison MD   apixaban (ELIQUIS) 5 MG TABS tablet Take 10 mg by mouth daily    Emperatriz Martin MD   Ascorbic Acid (VITAMIN C) 500 MG tablet Take 1,000 mg by mouth daily    Historical Provider, MD   calcium-vitamin D (OSCAL-500) 500-200 MG-UNIT per tablet Take 1 tablet by mouth daily. Historical Provider, MD       Allergies     Patient has no known allergies. Social History   TOBACCO:   reports that she quit smoking about 61 years ago. She has a 6.00 pack-year smoking history. She has never used smokeless tobacco.  ETOH:   reports no history of alcohol use. Patient currently lives with family in her home. This includes her  who I spoke to. Family History         Problem Relation Age of Onset    Heart Attack Mother         Myocardial Infarction- 76    Heart Disease Mother         MI   Preston Hedger Diabetes Mother     High Blood Pressure Mother     Diabetes Sister         Type 2    Other Brother         AAA    Heart Disease Brother         MI    Heart Disease Father         MI    Diabetes Sister     Heart Disease Brother         MI       Review of Systems   As in the admission H&P, reviewed and unchanged. Additional Musculoskeletal review as in HPI.     Physical Exam:    Vitals: /72   Pulse 78   Resp 20   Ht 4' 10\" medical management. Based on the fracture pattern I recommend intertrochanteric nailing of the left femur. The goal of surgical intervention is pain control and mobility. Postoperatively the patient will remain in the hospital for pain control, physical therapy, and medical optimization. Surgical risks and benefits were explained to the patient as well as the family; these included but not limited to infection, dislocation, periprosthetic fracture, DVT, medical complications, and continued pain and difficulty ambulating after surgical intervention. Postoperatively the patient will be weightbearing as tolerated and physical therapy will begin immediately. Post-operatively, we will assess the patients discharge needs with the help of case management. The patient will likely require a stay at a skilled nursing unit or ARU. The patient and family understands the risks and benefits of surgery and wishes to proceed with operative intervention. I also discussed this with the patient's  over the phone.     William Peguero, DO

## 2021-10-18 NOTE — ED NOTES
Bed: 03TR-03  Expected date:   Expected time:   Means of arrival:   Comments:  Trauma alert-81 F fall L hip fx with rotation-on blood thinners     Tariq Pineda RN  10/17/21 2024

## 2021-10-19 LAB
ABO/RH: NORMAL
ALBUMIN SERPL-MCNC: 3 GM/DL (ref 3.4–5)
ALP BLD-CCNC: 46 IU/L (ref 40–129)
ALT SERPL-CCNC: 22 U/L (ref 10–40)
ANION GAP SERPL CALCULATED.3IONS-SCNC: 8 MMOL/L (ref 4–16)
ANTIBODY SCREEN: NEGATIVE
APTT: 32.3 SECONDS (ref 25.1–37.1)
AST SERPL-CCNC: 24 IU/L (ref 15–37)
BASOPHILS ABSOLUTE: 0 K/CU MM
BASOPHILS RELATIVE PERCENT: 0.3 % (ref 0–1)
BILIRUB SERPL-MCNC: 0.6 MG/DL (ref 0–1)
BILIRUBIN DIRECT: 0.2 MG/DL (ref 0–0.3)
BILIRUBIN, INDIRECT: 0.4 MG/DL (ref 0–0.7)
BUN BLDV-MCNC: 23 MG/DL (ref 6–23)
CALCIUM SERPL-MCNC: 7.7 MG/DL (ref 8.3–10.6)
CHLORIDE BLD-SCNC: 107 MMOL/L (ref 99–110)
CHOLESTEROL: 103 MG/DL
CO2: 24 MMOL/L (ref 21–32)
CREAT SERPL-MCNC: 0.8 MG/DL (ref 0.6–1.1)
DIFFERENTIAL TYPE: ABNORMAL
EOSINOPHILS ABSOLUTE: 0 K/CU MM
EOSINOPHILS RELATIVE PERCENT: 0.3 % (ref 0–3)
GFR AFRICAN AMERICAN: >60 ML/MIN/1.73M2
GFR NON-AFRICAN AMERICAN: >60 ML/MIN/1.73M2
GLUCOSE BLD-MCNC: 119 MG/DL (ref 70–99)
HCT VFR BLD CALC: 27.2 % (ref 37–47)
HDLC SERPL-MCNC: 39 MG/DL
HEMOGLOBIN: 8.5 GM/DL (ref 12.5–16)
IMMATURE NEUTROPHIL %: 0.4 % (ref 0–0.43)
INR BLD: 1.09 INDEX
LDL CHOLESTEROL DIRECT: 42 MG/DL
LYMPHOCYTES ABSOLUTE: 0.9 K/CU MM
LYMPHOCYTES RELATIVE PERCENT: 8.4 % (ref 24–44)
MCH RBC QN AUTO: 32 PG (ref 27–31)
MCHC RBC AUTO-ENTMCNC: 31.3 % (ref 32–36)
MCV RBC AUTO: 102.3 FL (ref 78–100)
MONOCYTES ABSOLUTE: 0.7 K/CU MM
MONOCYTES RELATIVE PERCENT: 6.4 % (ref 0–4)
NUCLEATED RBC %: 0 %
PDW BLD-RTO: 12.9 % (ref 11.7–14.9)
PLATELET # BLD: 121 K/CU MM (ref 140–440)
PMV BLD AUTO: 11.1 FL (ref 7.5–11.1)
POTASSIUM SERPL-SCNC: 4.1 MMOL/L (ref 3.5–5.1)
PROTHROMBIN TIME: 14.1 SECONDS (ref 11.7–14.5)
RBC # BLD: 2.66 M/CU MM (ref 4.2–5.4)
SEGMENTED NEUTROPHILS ABSOLUTE COUNT: 8.6 K/CU MM
SEGMENTED NEUTROPHILS RELATIVE PERCENT: 84.2 % (ref 36–66)
SODIUM BLD-SCNC: 139 MMOL/L (ref 135–145)
TOTAL IMMATURE NEUTOROPHIL: 0.04 K/CU MM
TOTAL NUCLEATED RBC: 0 K/CU MM
TOTAL PROTEIN: 4.8 GM/DL (ref 6.4–8.2)
TRIGL SERPL-MCNC: 117 MG/DL
WBC # BLD: 10.2 K/CU MM (ref 4–10.5)

## 2021-10-19 PROCEDURE — 6360000002 HC RX W HCPCS: Performed by: PHYSICIAN ASSISTANT

## 2021-10-19 PROCEDURE — 85610 PROTHROMBIN TIME: CPT

## 2021-10-19 PROCEDURE — 80053 COMPREHEN METABOLIC PANEL: CPT

## 2021-10-19 PROCEDURE — 6370000000 HC RX 637 (ALT 250 FOR IP): Performed by: INTERNAL MEDICINE

## 2021-10-19 PROCEDURE — 2700000000 HC OXYGEN THERAPY PER DAY

## 2021-10-19 PROCEDURE — 36415 COLL VENOUS BLD VENIPUNCTURE: CPT

## 2021-10-19 PROCEDURE — 2580000003 HC RX 258: Performed by: STUDENT IN AN ORGANIZED HEALTH CARE EDUCATION/TRAINING PROGRAM

## 2021-10-19 PROCEDURE — 6360000002 HC RX W HCPCS: Performed by: STUDENT IN AN ORGANIZED HEALTH CARE EDUCATION/TRAINING PROGRAM

## 2021-10-19 PROCEDURE — 97530 THERAPEUTIC ACTIVITIES: CPT

## 2021-10-19 PROCEDURE — 94761 N-INVAS EAR/PLS OXIMETRY MLT: CPT

## 2021-10-19 PROCEDURE — 80061 LIPID PANEL: CPT

## 2021-10-19 PROCEDURE — 85025 COMPLETE CBC W/AUTO DIFF WBC: CPT

## 2021-10-19 PROCEDURE — 99024 POSTOP FOLLOW-UP VISIT: CPT | Performed by: STUDENT IN AN ORGANIZED HEALTH CARE EDUCATION/TRAINING PROGRAM

## 2021-10-19 PROCEDURE — 97166 OT EVAL MOD COMPLEX 45 MIN: CPT

## 2021-10-19 PROCEDURE — 83721 ASSAY OF BLOOD LIPOPROTEIN: CPT

## 2021-10-19 PROCEDURE — 97116 GAIT TRAINING THERAPY: CPT

## 2021-10-19 PROCEDURE — 82248 BILIRUBIN DIRECT: CPT

## 2021-10-19 PROCEDURE — 97162 PT EVAL MOD COMPLEX 30 MIN: CPT

## 2021-10-19 PROCEDURE — 85730 THROMBOPLASTIN TIME PARTIAL: CPT

## 2021-10-19 PROCEDURE — 6370000000 HC RX 637 (ALT 250 FOR IP): Performed by: STUDENT IN AN ORGANIZED HEALTH CARE EDUCATION/TRAINING PROGRAM

## 2021-10-19 PROCEDURE — 1200000000 HC SEMI PRIVATE

## 2021-10-19 RX ADMIN — ACETAMINOPHEN 650 MG: 325 TABLET ORAL at 18:19

## 2021-10-19 RX ADMIN — SODIUM CHLORIDE, PRESERVATIVE FREE 10 ML: 5 INJECTION INTRAVENOUS at 10:15

## 2021-10-19 RX ADMIN — SODIUM CHLORIDE, PRESERVATIVE FREE 10 ML: 5 INJECTION INTRAVENOUS at 20:27

## 2021-10-19 RX ADMIN — Medication 1 TABLET: at 10:13

## 2021-10-19 RX ADMIN — ENOXAPARIN SODIUM 40 MG: 40 INJECTION SUBCUTANEOUS at 10:14

## 2021-10-19 RX ADMIN — MORPHINE SULFATE 4 MG: 2 INJECTION, SOLUTION INTRAMUSCULAR; INTRAVENOUS at 20:34

## 2021-10-19 RX ADMIN — METOPROLOL SUCCINATE 50 MG: 50 TABLET, EXTENDED RELEASE ORAL at 10:13

## 2021-10-19 RX ADMIN — SODIUM CHLORIDE: 9 INJECTION, SOLUTION INTRAVENOUS at 05:19

## 2021-10-19 RX ADMIN — SENNOSIDES, DOCUSATE SODIUM 1 TABLET: 8.6; 5 TABLET ORAL at 10:13

## 2021-10-19 RX ADMIN — CEFAZOLIN SODIUM 2000 MG: 2 INJECTION, SOLUTION INTRAVENOUS at 02:45

## 2021-10-19 RX ADMIN — SENNOSIDES, DOCUSATE SODIUM 1 TABLET: 8.6; 5 TABLET ORAL at 20:27

## 2021-10-19 RX ADMIN — SODIUM CHLORIDE: 9 INJECTION, SOLUTION INTRAVENOUS at 22:38

## 2021-10-19 RX ADMIN — SODIUM CHLORIDE: 9 INJECTION, SOLUTION INTRAVENOUS at 13:35

## 2021-10-19 RX ADMIN — ATORVASTATIN CALCIUM 10 MG: 20 TABLET, FILM COATED ORAL at 10:12

## 2021-10-19 ASSESSMENT — PAIN SCALES - GENERAL
PAINLEVEL_OUTOF10: 8
PAINLEVEL_OUTOF10: 0
PAINLEVEL_OUTOF10: 9
PAINLEVEL_OUTOF10: 0

## 2021-10-19 ASSESSMENT — PAIN DESCRIPTION - PAIN TYPE: TYPE: SURGICAL PAIN

## 2021-10-19 ASSESSMENT — PAIN DESCRIPTION - DESCRIPTORS: DESCRIPTORS: ACHING

## 2021-10-19 ASSESSMENT — PAIN DESCRIPTION - ORIENTATION: ORIENTATION: LEFT

## 2021-10-19 ASSESSMENT — PAIN DESCRIPTION - FREQUENCY: FREQUENCY: INTERMITTENT

## 2021-10-19 ASSESSMENT — PAIN DESCRIPTION - LOCATION: LOCATION: HIP

## 2021-10-19 NOTE — CONSULTS
[facility] PHYSICAL THERAPY EVALUATION  Tai Ontiveros, 1940, 1114/1114-A, 10/19/2021    History  [place]:  The encounter diagnosis was Closed fracture of hip, unspecified laterality, initial encounter ([address] Patient  has a past medical history of Compression fracture of lumbar spine, non-traumatic ([address] Compression fx, thoracic spine ([address] Degenerative disc disease, lumbar, Environmental allergies, Hx of colonic polyps, Hypertension, Osteoarthritis of lumbar spine, Osteoporosis, PONV (postoperative nausea and vomiting), Spondylolisthesis of lumbar region, Surgical menopause, TIA (transient ischemic attack), and Unspecified cerebral artery occlusion with cerebral infarction. Patient  has a past surgical history that includes Dilation and curettage of uterus (age31's); Finger trigger release (Right, 12/23/2014); Cholecystectomy (age 29's); Colonoscopy (12/2010); Hysterectomy (age 29's); Tonsillectomy (age 22); hernia repair (2011); and vertebroplasty (8/24/15). Subjective:  Patient states:  \"I get nauseas with anesthesia. \"    Pain:  Denies pain at rest.    Communication with other providers:  Handoff to RN, OT  Restrictions: WBAT L LE, general precautions, fall risk    Home Setup/Prior level of function  Social/Functional History  Lives With: Spouse  Type of Home: House  Home Layout: Two level (Bi-level; 7+7 stairs with 1 HR)  Home Access: Level entry  Bathroom Shower/Tub: Tub/Shower unit  Bathroom Toilet: Standard  Bathroom Equipment: Toilet raiser  Bathroom Accessibility: Accessible  Home Equipment: Standard walker  ADL Assistance: Independent  Homemaking Assistance: Independent  Homemaking Responsibilities: Yes  Ambulation Assistance: Independent (uses no AD)  Transfer Assistance: Independent  Active : Yes  Occupation: Retired  Additional Comments: Pt reports falling from [employer] off my shoe\"    Examination of body systems (includes body structures/functions, activity/participation limitations):  · Observation:  Pt supine in bed with  present upon arrival and agreeable to therapy  · Vision:  Wears glasses  · [facility]  · Cardiopulmonary:  No O2 needs  · Cognition: WFL, see OT/SLP note for further evaluation. Musculoskeletal  · ROM R/L:  WFL. · Strength R LE 4+/5 L LE: 3/5, moderate impairment in function and endurance. · Neuro:  Pt with resting tremors which she states is chronic      Mobility:  · Rolling L/R:  SBA  · Supine to sit:  Mod A with assist at LEs and trunk with increased time and effort to complete. Increased time and effort to complete  · Transfers: Pt completed STS from EOB min A and to chair min A with cues for sequencing  · Sitting balance:  good. · Standing balance:  Fair+. · Gait: Pt ambulated 8' with RW CGA with cues for sequencing and walker management. Pt ambulated with decreased mary, decreased stance phase on L LE, and decreased step length bilaterally. Distance limited due to pt becoming nauseas and requesting to sit. [facility] 6 Clicks Inpatient Mobility:  AM-PAC Inpatient Mobility Raw Score : 16    Safety: patient left in chair with RN in room, call light within reach, RN notified, gait belt used. Assessment:  Pt is an 80 y.o. female admitted to the hospital after a fall resulting in a left hip intertrochanteric fracture. Pt underwent a femur Im nail jazzy insertion on 10/18/2021. Pt is typically independent with all ambulation and transfers without a device. Pt is currently performing bed mobility mod A, transferring min A, and ambulating 8' with RW CGA. Pt is presenting with decreased endurance, impaired balance, increased pain, impaired bed mobility, impaired transfers, and impaired gait. Pt would benefit from continued acute care PT as well as ARU placement to continue to address impairments. Complexity: moderate    Prognosis: Good, no significant barriers to participation at this time.      Plan Times per week: 6+/week     Equipment: TBD at next level of care    Goals:  Short term goals  Time Frame for Short term goals: 1 week  Short term goal 1: Pt to complete all bed mobility SBA  Short term goal 2: Pt to complete all STS transfers to/from bed, commode, and chair CGA  Short term goal 3: Pt to ambulate 48' with LRAD CGA  Short term goal 4: Pt to ascend/descend 7 + 7 stairs with HR and CGA when appropriate to simulate home set up       Treatment plan:  Bed mobility, transfers, balance, gait, TA, TX    Recommendations for NURSING mobility: amb with gait belt and RW    Time:   Time in: 0950  Time out: 1014  Timed treatment minutes: 12  Total time: 24    Electronically signed by:    Carmencita Pisaon PT  10/19/2021, 12:52 PM

## 2021-10-19 NOTE — PROGRESS NOTES
Medications:    sodium chloride flush  5-40 mL IntraVENous 2 times per day    atorvastatin  10 mg Oral Daily    calcium-cholecalciferol  1 tablet Oral Daily    metoprolol succinate  50 mg Oral Daily    sodium chloride flush  5-40 mL IntraVENous 2 times per day    enoxaparin  40 mg SubCUTAneous Daily    sodium chloride flush  5-40 mL IntraVENous 2 times per day    sennosides-docusate sodium  1 tablet Oral BID      Infusions:    sodium chloride      sodium chloride      sodium chloride 25 mL (10/18/21 8894)    sodium chloride 125 mL/hr at 10/19/21 0519     PRN Meds: sodium chloride flush, 5-40 mL, PRN  sodium chloride, 25 mL, PRN  ondansetron, 4 mg, Q8H PRN   Or  ondansetron, 4 mg, Q6H PRN  polyethylene glycol, 17 g, Daily PRN  acetaminophen, 650 mg, Q6H PRN   Or  acetaminophen, 650 mg, Q6H PRN  morphine, 4 mg, Q30 Min PRN  sodium chloride flush, 5-40 mL, PRN  sodium chloride, 25 mL, PRN  bisacodyl, 5 mg, Daily PRN  sodium chloride flush, 5-40 mL, PRN  sodium chloride, 25 mL, PRN  magnesium hydroxide, 30 mL, Daily PRN          Electronically signed by Aron Davies MD on 10/19/2021 at 9:19 AM

## 2021-10-19 NOTE — PROGRESS NOTES
Occupational Therapy    [facility] OCCUPATIONAL THERAPY EVALUATION    [place] , 1940, 1114/1114-A, 10/19/2021    Discharge Recommendation: Demetris Butcher      History:  [place]:  The encounter diagnosis was Closed fracture of hip, unspecified laterality, initial encounter ([address] Subjective:  Patient states: \"It only hurts when I move it! \"   Pain: Pt reported 6/10 surgical pain in Lt hip with movement, denied pain at rest  Communication with other providers: PT Martyn Schwab, RN Sister Alexia Maurer  Restrictions: General Precautions, Fall Risk, WBAT Lt LE, IV, Nguyen, SCDs, Bed/chair alarm    Home Setup/Prior level of function:  Social/Functional History  Lives With: Spouse  Type of Home: House  Home Layout: Two level (Bi-level; 7+7 stairs with 1 HR)  Home Access: Level entry  Bathroom Shower/Tub: Tub/Shower unit  Bathroom Toilet: Standard  Bathroom Equipment: Toilet raiser  Bathroom Accessibility: Accessible  Home Equipment: Standard walker  ADL Assistance: Independent  Homemaking Assistance: Independent  Homemaking Responsibilities: Yes  Ambulation Assistance: Independent (uses no AD)  Transfer Assistance: Independent  Active : Yes  Occupation: Retired  Additional Comments: Pt reports falling from [employer] off my shoe\"    Examination:  · Observation: Supine in bed upon arrival. Pleasant and agreeable to evaluation.  present and supportive throughout session.   · Vision: WFL (Glasses)  · Hearing: [facility]  · Vitals: Stable vitals throughout session    Body Systems and functions:  · ROM: WFL all joints in BL UEs  · Strength: 4 to 4+/5 MMT all major muscle groups BL UEs  · Sensation: WFL (denies numbness/tingling)  · Tone: Normal  · Coordination: WFL for ADLs  · Perception: WNL    Activities of Daily Living (ADLs):  · Feeding: Independent   · Grooming: SBA (completed seated oral hygiene tasks of brushing dentures/rinsing; unable to tolerate in standing this date)  · UB bathing: SBA · LB bathing: Max A (reaching distal LEs, bottom, posterior thighs)  · UB dressing: SBA (donning clean robe seated EOB)  · LB dressing: Dependent (donning BL socks)  · Toileting: Max A (assist for clothing mgmt both directions)    Cognitive and Psychosocial Functioning:  · Overall cognitive status: WNL  · Affect: Normal     Balance:   · Sitting: SBA in unsupported sitting EOB  · Standing: CGA with RW    Functional Mobility:  · Bed Mobility: Max A supine to sitting EOB (assist for scooting Lt LE to edge but pt able to scoot Rt LE, max trunk boost required for uprighting and coaching for technique/safe hand placement)  · Transfers: Min A to/from bed and recliner (min cues for safe hand placement each direction)  · Ambulation: CGA to Min A with RW 8 ft; antalgic gait, step-to pattern used, mod cues for sequencing steps and RW mgmt (limited by nausea this date)      AM-PAC 6 click short form for inpatient daily activity:   How much help from another person does the patient currently need. .. Unable  Dep A Lot  Max A A Lot   Mod A A Little  Min A A Little   CGA  SBA None   Mod I  Indep  Sup   1. Putting on and taking off regular lower body clothing? [x] 1    [] 2   [] 2   [] 3   [] 3   [] 4      2. Bathing (including washing, rinsing, drying)? [] 1   [] 2   [x] 2 [] 3 [] 3 [] 4   3. Toileting, which includes using toilet, bedpan, or urinal? [] 1    [x] 2   [] 2   [] 3   [] 3   [] 4     4. Putting on and taking off regular upper body clothing? [] 1   [] 2   [] 2   [] 3   [x] 3    [] 4      5. Taking care of personal grooming such as brushing teeth? [] 1   [] 2    [] 2 [] 3    [x] 3   [] 4      6. Eating meals? [] 1   [] 2   [] 2   [] 3   [] 3   [x] 4      Raw Score:  15   [24=0% impaired(CH), 23=1-19%(CI), 20-22=20-39%(CJ), 15-19=40-59%(CK), 10-14=60-79%(CL), 7-9=80-99%(CM), 6=100%(CN)]     Treatment:  Therapeutic Activity Training:   Therapeutic activity training was instructed today.   Cues were given for safety, sequence, UE/LE placement, awareness, and balance. Activities performed today included bed mobility training, UB/LB dressing tasks, transfer training, household mobility with RW, oral hygiene, education on role of OT, POC, WBAT status Lt LE, importance of EOB/OOB activity, d/c planning      Safety Measures: Gait belt used, Left in Chair, Alarm in place    Assessment:  Pt is an 80year old female with a past medical history of Compression fracture of lumbar spine, non-traumatic (HCC), Compression fx, thoracic spine ([facility] [address] Degenerative disc disease, lumbar, Environmental allergies, Hx of colonic polyps, Hypertension, Osteoarthritis of lumbar spine, Osteoporosis, PONV (postoperative nausea and vomiting), Spondylolisthesis of lumbar region, Surgical menopause, TIA (transient ischemic attack), and Unspecified cerebral artery occlusion with cerebral infarction. Pt admitted following a fall and diagnosed with a Lt hip intertrochanteric fracture. Pt underwent Lt hip IM nailing on 10-18. Pt lives at home with her  and at baseline is fully independent with ADLs, IADLs, mobility, and driving. Pt currently presents with the above impairments, and is not safe for immediate return home. Recommend continued OT services in SNF at discharge. Complexity: Moderate  Prognosis: Good  Plan: 3+x/week      Goals:  1. Pt will complete all aspects of bed mobility for EOB/OOB ADLs min A  2. Pt will complete UB/LB bathing min A with setup using long handled sponge PRN  3. Pt will complete all aspects of LB dressing mod A with setup using AE PRN  4. Pt will complete all functional transfers to and from bed, chair, toilet, shower chair CGA/good safety awareness  5. Pt will ambulate HH distance to bathroom for toileting CGA with RW  6. Pt will complete all aspects of toileting task CGA  7. Pt will complete oral hygiene/grooming routine in standing at sink SBA with setup  8.  Pt will complete ther ex/ther act with focus on UE

## 2021-10-19 NOTE — PROGRESS NOTES
Patient urine output has been minimal this shift,200 cc emptied from her srinivasan. Her BP is soft,(see vitals),she is on a continuous fluid at 125ML/hr.bladder scan showed no residual.Lungs clear diminished and non pitting edema bilateral legs, no changes from previous assessment. The [facility]  notified and we are waiting the result from AM labs he replied.

## 2021-10-19 NOTE — CARE COORDINATION
Chart reviewed Pt from home. POD 1 from [facility] IM NAIL PB INSERTION. Whiteboard placed asking therapy to see Pt. LSW reviewed chart/into room to initiate discharge planning. Pt is from home with spouse. Pt stated that she drives. Pt has a walker and a raised toilet seat. Pt has a PCP and is able to afford Rx. LSW talked to Pt about the recommendations of therapy for rehab. Pt is not sure what she wants to do. Pt wants to talk to Pt spouse first.LSW to return to talk to Pt after she talked to her spouse.

## 2021-10-20 ENCOUNTER — APPOINTMENT (OUTPATIENT)
Dept: GENERAL RADIOLOGY | Age: 81
DRG: 480 | End: 2021-10-20
Payer: MEDICARE

## 2021-10-20 LAB
ALBUMIN SERPL-MCNC: 2.9 GM/DL (ref 3.4–5)
ALP BLD-CCNC: 52 IU/L (ref 40–128)
ALT SERPL-CCNC: 34 U/L (ref 10–40)
ANION GAP SERPL CALCULATED.3IONS-SCNC: 9 MMOL/L (ref 4–16)
AST SERPL-CCNC: 45 IU/L (ref 15–37)
BACTERIA: ABNORMAL /HPF
BASOPHILS ABSOLUTE: 0 K/CU MM
BASOPHILS RELATIVE PERCENT: 0.2 % (ref 0–1)
BILIRUB SERPL-MCNC: 0.6 MG/DL (ref 0–1)
BILIRUBIN URINE: NEGATIVE MG/DL
BLOOD, URINE: ABNORMAL
BUN BLDV-MCNC: 19 MG/DL (ref 6–23)
CALCIUM SERPL-MCNC: 7.4 MG/DL (ref 8.3–10.6)
CHLORIDE BLD-SCNC: 108 MMOL/L (ref 99–110)
CLARITY: CLEAR
CO2: 20 MMOL/L (ref 21–32)
COLOR: YELLOW
CREAT SERPL-MCNC: 0.7 MG/DL (ref 0.6–1.1)
DIFFERENTIAL TYPE: ABNORMAL
EOSINOPHILS ABSOLUTE: 0.2 K/CU MM
EOSINOPHILS RELATIVE PERCENT: 2 % (ref 0–3)
GFR AFRICAN AMERICAN: >60 ML/MIN/1.73M2
GFR NON-AFRICAN AMERICAN: >60 ML/MIN/1.73M2
GLUCOSE BLD-MCNC: 118 MG/DL (ref 70–99)
GLUCOSE, URINE: NEGATIVE MG/DL
HCT VFR BLD CALC: 23.5 % (ref 37–47)
HEMOGLOBIN: 7.5 GM/DL (ref 12.5–16)
IMMATURE NEUTROPHIL %: 0.6 % (ref 0–0.43)
KETONES, URINE: NEGATIVE MG/DL
LACTATE: 2.4 MMOL/L (ref 0.4–2)
LEUKOCYTE ESTERASE, URINE: ABNORMAL
LYMPHOCYTES ABSOLUTE: 1.5 K/CU MM
LYMPHOCYTES RELATIVE PERCENT: 14.9 % (ref 24–44)
MCH RBC QN AUTO: 32.8 PG (ref 27–31)
MCHC RBC AUTO-ENTMCNC: 31.9 % (ref 32–36)
MCV RBC AUTO: 102.6 FL (ref 78–100)
MONOCYTES ABSOLUTE: 0.6 K/CU MM
MONOCYTES RELATIVE PERCENT: 6.3 % (ref 0–4)
MUCUS: ABNORMAL HPF
NITRITE URINE, QUANTITATIVE: NEGATIVE
NUCLEATED RBC %: 0 %
PDW BLD-RTO: 13 % (ref 11.7–14.9)
PH, URINE: 5 (ref 5–8)
PLATELET # BLD: 126 K/CU MM (ref 140–440)
PMV BLD AUTO: 11 FL (ref 7.5–11.1)
POTASSIUM SERPL-SCNC: 3.7 MMOL/L (ref 3.5–5.1)
PROTEIN UA: 30 MG/DL
RBC # BLD: 2.29 M/CU MM (ref 4.2–5.4)
RBC URINE: 26 /HPF (ref 0–6)
SEGMENTED NEUTROPHILS ABSOLUTE COUNT: 7.5 K/CU MM
SEGMENTED NEUTROPHILS RELATIVE PERCENT: 76 % (ref 36–66)
SODIUM BLD-SCNC: 137 MMOL/L (ref 135–145)
SPECIFIC GRAVITY UA: 1.02 (ref 1–1.03)
SQUAMOUS EPITHELIAL: 1 /HPF
TOTAL IMMATURE NEUTOROPHIL: 0.06 K/CU MM
TOTAL NUCLEATED RBC: 0 K/CU MM
TOTAL PROTEIN: 4.6 GM/DL (ref 6.4–8.2)
TRICHOMONAS: ABNORMAL /HPF
UROBILINOGEN, URINE: NEGATIVE MG/DL (ref 0.2–1)
WBC # BLD: 9.9 K/CU MM (ref 4–10.5)
WBC UA: 10 /HPF (ref 0–5)

## 2021-10-20 PROCEDURE — 92610 EVALUATE SWALLOWING FUNCTION: CPT

## 2021-10-20 PROCEDURE — 6370000000 HC RX 637 (ALT 250 FOR IP): Performed by: INTERNAL MEDICINE

## 2021-10-20 PROCEDURE — 2580000003 HC RX 258: Performed by: HOSPITALIST

## 2021-10-20 PROCEDURE — 6360000002 HC RX W HCPCS: Performed by: HOSPITALIST

## 2021-10-20 PROCEDURE — 94761 N-INVAS EAR/PLS OXIMETRY MLT: CPT

## 2021-10-20 PROCEDURE — 80048 BASIC METABOLIC PNL TOTAL CA: CPT

## 2021-10-20 PROCEDURE — 83605 ASSAY OF LACTIC ACID: CPT

## 2021-10-20 PROCEDURE — 36415 COLL VENOUS BLD VENIPUNCTURE: CPT

## 2021-10-20 PROCEDURE — 87086 URINE CULTURE/COLONY COUNT: CPT

## 2021-10-20 PROCEDURE — 1200000000 HC SEMI PRIVATE

## 2021-10-20 PROCEDURE — 97116 GAIT TRAINING THERAPY: CPT

## 2021-10-20 PROCEDURE — 87040 BLOOD CULTURE FOR BACTERIA: CPT

## 2021-10-20 PROCEDURE — 2580000003 HC RX 258: Performed by: NURSE PRACTITIONER

## 2021-10-20 PROCEDURE — 85025 COMPLETE CBC W/AUTO DIFF WBC: CPT

## 2021-10-20 PROCEDURE — 6360000002 HC RX W HCPCS: Performed by: STUDENT IN AN ORGANIZED HEALTH CARE EDUCATION/TRAINING PROGRAM

## 2021-10-20 PROCEDURE — 81001 URINALYSIS AUTO W/SCOPE: CPT

## 2021-10-20 PROCEDURE — 51798 US URINE CAPACITY MEASURE: CPT

## 2021-10-20 PROCEDURE — 97530 THERAPEUTIC ACTIVITIES: CPT

## 2021-10-20 PROCEDURE — 6370000000 HC RX 637 (ALT 250 FOR IP): Performed by: STUDENT IN AN ORGANIZED HEALTH CARE EDUCATION/TRAINING PROGRAM

## 2021-10-20 PROCEDURE — 99024 POSTOP FOLLOW-UP VISIT: CPT | Performed by: STUDENT IN AN ORGANIZED HEALTH CARE EDUCATION/TRAINING PROGRAM

## 2021-10-20 PROCEDURE — 2580000003 HC RX 258: Performed by: STUDENT IN AN ORGANIZED HEALTH CARE EDUCATION/TRAINING PROGRAM

## 2021-10-20 PROCEDURE — 71045 X-RAY EXAM CHEST 1 VIEW: CPT

## 2021-10-20 PROCEDURE — 80053 COMPREHEN METABOLIC PANEL: CPT

## 2021-10-20 PROCEDURE — 94150 VITAL CAPACITY TEST: CPT

## 2021-10-20 PROCEDURE — 6370000000 HC RX 637 (ALT 250 FOR IP): Performed by: HOSPITALIST

## 2021-10-20 RX ORDER — SODIUM CHLORIDE 9 MG/ML
INJECTION, SOLUTION INTRAVENOUS CONTINUOUS
Status: ACTIVE | OUTPATIENT
Start: 2021-10-20 | End: 2021-10-21

## 2021-10-20 RX ADMIN — SODIUM CHLORIDE: 9 INJECTION, SOLUTION INTRAVENOUS at 20:33

## 2021-10-20 RX ADMIN — ENOXAPARIN SODIUM 40 MG: 40 INJECTION SUBCUTANEOUS at 09:00

## 2021-10-20 RX ADMIN — MAGNESIUM HYDROXIDE 30 ML: 2400 SUSPENSION ORAL at 09:08

## 2021-10-20 RX ADMIN — METOPROLOL SUCCINATE 50 MG: 50 TABLET, EXTENDED RELEASE ORAL at 08:59

## 2021-10-20 RX ADMIN — APIXABAN 5 MG: 5 TABLET, FILM COATED ORAL at 10:24

## 2021-10-20 RX ADMIN — SODIUM CHLORIDE, PRESERVATIVE FREE 10 ML: 5 INJECTION INTRAVENOUS at 11:10

## 2021-10-20 RX ADMIN — Medication 1 TABLET: at 08:59

## 2021-10-20 RX ADMIN — SENNOSIDES, DOCUSATE SODIUM 1 TABLET: 8.6; 5 TABLET ORAL at 20:32

## 2021-10-20 RX ADMIN — ATORVASTATIN CALCIUM 10 MG: 20 TABLET, FILM COATED ORAL at 08:58

## 2021-10-20 RX ADMIN — PIPERACILLIN AND TAZOBACTAM 3375 MG: 3; .375 INJECTION, POWDER, LYOPHILIZED, FOR SOLUTION INTRAVENOUS at 18:17

## 2021-10-20 RX ADMIN — SODIUM CHLORIDE: 9 INJECTION, SOLUTION INTRAVENOUS at 06:54

## 2021-10-20 RX ADMIN — PIPERACILLIN AND TAZOBACTAM 3375 MG: 3; .375 INJECTION, POWDER, LYOPHILIZED, FOR SOLUTION INTRAVENOUS at 11:09

## 2021-10-20 RX ADMIN — APIXABAN 5 MG: 5 TABLET, FILM COATED ORAL at 20:32

## 2021-10-20 RX ADMIN — SENNOSIDES, DOCUSATE SODIUM 1 TABLET: 8.6; 5 TABLET ORAL at 08:59

## 2021-10-20 ASSESSMENT — PAIN SCALES - GENERAL: PAINLEVEL_OUTOF10: 0

## 2021-10-20 NOTE — PROGRESS NOTES
Complaint  Patient Complaint: weakness    Pain:  Pain Assessment  Pain Assessment: 0-10  Pain Level: 0  Patient's Stated Pain Goal: No pain  Pain Type: Surgical pain  Pain Location: Hip  Pain Orientation: Left  Pain Descriptors: Aching  Pain Frequency: Intermittent  Response to Pain Intervention: Asleep with RR greater than 10, Patient Satisfied    Reason for Referral  Courtney Yates was referred for a bedside swallow evaluation to assess the efficiency of her swallow function, identify signs and symptoms of aspiration and make recommendations regarding safe dietary consistencies, effective compensatory strategies, and safe eating environment. Impression  Dysphagia Diagnosis: Swallow function appears grossly intact  Dysphagia Outcome Severity Scale: Level 6: Within functional limits/Modified independence     Treatment Plan  Requires SLP Intervention: No  Duration/Frequency of Treatment: n/a  D/C Recommendations: To be determined       Recommended Diet and Intervention  Diet Solids Recommendation: Regular  Liquid Consistency Recommendation: Thin  Recommended Form of Meds: PO          Compensatory Swallowing Strategies  Compensatory Swallowing Strategies: Upright as possible for all oral intake    Treatment/Goals  Short-term Goals  Timeframe for Short-term Goals: n/a    General  Chart Reviewed: Yes  Behavior/Cognition: Alert; Cooperative;Pleasant mood  Respiratory Status: O2 via nasual cannula  O2 Device: Nasal cannula  Liters of Oxygen: 2 L  Communication Observation: Functional  Follows Directions: Complex  Dentition: Adequate  Patient Positioning: Upright in bed  Baseline Vocal Quality: Normal  Volitional Cough: Strong  Prior Dysphagia History: Pt denies  Consistencies Administered: Reg solid; Dysphagia Pureed (Dysphagia I); Thin - cup; Thin - straw           Vision/Hearing  Vision  Vision: Within Functional Limits  Hearing  Hearing: Within functional limits    Oral Motor Deficits  Oral/Motor  Oral Motor:  Within functional limits    Oral Phase Dysfunction  Oral Phase  Oral Phase: WFL     Indicators of Pharyngeal Phase Dysfunction   Pharyngeal Phase  Pharyngeal Phase: WFL    Prognosis  Prognosis  Prognosis for safe diet advancement: good  Barriers/Prognosis Comment: recommendations/POC  Individuals consulted  Consulted and agree with results and recommendations: Patient    Education  Patient Education: recommendations/POC  Patient Education Response: Verbalizes understanding  Safety Devices in place: Yes  Type of devices:  All fall risk precautions in place       Therapy Time  SLP Individual Minutes  Time In: 3114  Time Out: [address] [facility]  Minutes: [address] [facility], [facility] [address], [id number] [facility], 10/20/2021

## 2021-10-20 NOTE — PROGRESS NOTES
Physical Therapy Treatment Note  Name: Marco Ventura MRN: [id number] :   1940   Date:  10/20/2021   Admission Date: 10/17/2021 Room:  [address] [place] [address]     Restrictions/Precautions:        general precautions, fall risk, WBAT L LE    Communication with other providers:  RN    Subjective:  Patient states:  \"I'll give it what I've got\"  Pain:   Location, Type, Intensity (0/10 to 10/10): Denies pain at rest    Objective:    Observation:  Pt supine in bed upon entry and agreeable to session    Treatment, including education/measures:    Bed mobility: Pt completed supine to sitting EOB mod A with assist at L LE and hips. Pt completed sitting to supine max A with assist at LEs and trunk. Increased time and effort to complete    Transfers: Pt completed STS to/from EOB min A with cues for hand placement    Gait: pt ambulated 25' with RW CGA/min A with significantly decreased mary, decreased stance phase on L LE, decreased step length bilaterally, and narrow RAMÍREZ. Cues provided for L leading step to gait pattern and walker management. Assessment / Impression:    Pt supine in bed at end of session with needs in reach and alarm on.     Patient's tolerance of treatment:  well   Adverse Reaction: n/a  Significant change in status and impact:  n/a  Barriers to improvement:  Decreased endurance    Plan for Next Session:    Continue to address transfer training and gait training in future sessions      Time in:  1425  Time out:  1453  Timed treatment minutes:  28  Total treatment time:  28    Previously filed items:  Social/Functional History  Lives With: Spouse  Type of Home: House  Home Layout: Two level (Bi-level; 7+7 stairs with 1 HR)  Home Access: Level entry  Bathroom Shower/Tub: Tub/Shower unit  Bathroom Toilet: Standard  Bathroom Equipment: Toilet raiser  Bathroom Accessibility: Accessible  Home Equipment: Standard walker  ADL Assistance: [address] [place] [address] Avenue: [address] [place] [address] Responsibilities: Yes  Ambulation Assistance: Independent (uses no AD)  Transfer Assistance: Independent  Active : Yes  Occupation: Retired  Additional Comments: Pt reports falling from [facility] off my shoe\"  Short term goals  Time Frame for Short term goals: 1 week  Short term goal 1: Pt to complete all bed mobility SBA  Short term goal 2: Pt to complete all STS transfers to/from bed, commode, and chair CGA  Short term goal 3: Pt to ambulate 48' with LRAD CGA  Short term goal 4: Pt to ascend/descend 7 + 7 stairs with HR and CGA when appropriate to simulate home set up       Electronically signed by:    Tania North PT  10/20/2021, 4:00 PM

## 2021-10-20 NOTE — PROGRESS NOTES
Hospitalist Progress Note      Name:  Markie Buenrostro /Age/Sex: 1940  (80 y.o. female)   MRN & CSN:  [id number] & [id number] Admission Date/Time: 10/17/2021  8:24 PM   Location:  [address] [place] [address] PCP: Kari Miller MD         Hospital Day: 4    Assessment and Plan:       Left intertrochanteric femoral fracture status post ORIF on   Management as per orthopedics  Continue on pain medication  DVT prophylaxis  Bowel regimen  PT OT evaluation  May need rehab    10/20:  Patient developed shortness of breath overnight and had to put her on 1 L nasal cannula oxygen  Also she developed features of sepsis with fever and leukocytosis  I started the patient on IV Zosyn  Nurse report urine output is also decreasing, she has Nguyen's catheter  Did order bladder scan  Order urinalysis urine culture blood culture  Chest x-ray  Ordered swallow evaluation to rule out aspiration pneumonia  Because of fever and leukocytosis not clear currently  Could be postoperative pneumonia or UTI  Check also lactic acid          Patient developed shortness of breath overnight and had to put her on 1 L nasal cannula oxygen  Also she developed features of sepsis with fever and leukocytosis  I started the patient on IV Zosyn  Nurse report urine output is also decreasing, she has Nguyen's catheter  Did order bladder scan  Order urinalysis urine culture blood culture  Chest x-ray        2-hypertension blood pressure acceptable on current management      3-hyperlipidemia continue on statin      4-history of paroxysmal atrial fibrillation the patient was on Eliquis before surgery   resume on Eliquis 5 mg bid ,  Orthopedics agrees         Diet ADULT DIET;  Regular   DVT Prophylaxis [] Lovenox, []  Heparin, [] SCDs, [] Ambulation   GI Prophylaxis [] PPI,  [] H2 Blocker,  [] Carafate,  [] Diet/Tube Feeds   Code Status Full Code   Disposition Patient requires continued admission due to    MDM [] Low, [] Moderate,[]  High  Patient's risk as above due to      History of Present Illness:   Patient was seen and examined at the bedside  Patient developed shortness of breath overnight and had to put her on 1 L nasal cannula oxygen  Also she developed features of sepsis with fever and leukocytosis  I started the patient on IV Zosyn  Nurse report urine output is also decreasing, she has Nguyen's catheter  Did order bladder scan  Order urinalysis urine culture blood culture  Chest x-ray      Objective: Intake/Output Summary (Last 24 hours) at 10/20/2021 0924  Last data filed at 10/20/2021 0500  Gross per 24 hour   Intake 1500 ml   Output 550 ml   Net 950 ml      Vitals:   Vitals:    10/20/21 0815   BP: (!) 129/57   Pulse: 97   Resp: 20   Temp: 100.4 °F (38 °C)   SpO2: 94%     Physical Exam:   GEN Awake.  Alert , not in respiratory distress, not in pain  HEENT: PEERLA, , supple neck,   Chest: air entry equal bilaterally, no wheezing or crepitation  Heart: S1 and S2 heard, no murmur, no gallop or rub, regular rate  Abdomen: soft, ND , Nt, +BS  Extremities: no cyanosis, tenderness or erythema, peripheral pulses audible  Neurology: alert, oriented x3, able to move 4 limbs    Medications:   Medications:    apixaban  5 mg Oral BID    piperacillin-tazobactam  3,375 mg IntraVENous Q8H    sodium chloride flush  5-40 mL IntraVENous 2 times per day    atorvastatin  10 mg Oral Daily    calcium-cholecalciferol  1 tablet Oral Daily    metoprolol succinate  50 mg Oral Daily    sodium chloride flush  5-40 mL IntraVENous 2 times per day    sodium chloride flush  5-40 mL IntraVENous 2 times per day    sennosides-docusate sodium  1 tablet Oral BID      Infusions:    sodium chloride      sodium chloride      sodium chloride 25 mL (10/18/21 0914)     PRN Meds: sodium chloride flush, 5-40 mL, PRN  sodium chloride, 25 mL, PRN  ondansetron, 4 mg, Q8H PRN   Or  ondansetron, 4 mg, Q6H PRN  polyethylene glycol, 17 g, Daily PRN  acetaminophen, 650 mg, Q6H PRN Or  acetaminophen, 650 mg, Q6H PRN  morphine, 4 mg, Q30 Min PRN  sodium chloride flush, 5-40 mL, PRN  sodium chloride, 25 mL, PRN  bisacodyl, 5 mg, Daily PRN  sodium chloride flush, 5-40 mL, PRN  sodium chloride, 25 mL, PRN  magnesium hydroxide, 30 mL, Daily PRN          Electronically signed by Christine Mina MD on 10/20/2021 at 9:24 AM

## 2021-10-20 NOTE — PROGRESS NOTES
Eugenie Keene (1940)    Daily Progress Note-  Robles Beaulieu DO, MD                     Today's Date:   10/20/2021          Subjective:     Patient seen and examined resting comfortably in bed this morning. Patient is doing well postoperatively, no orthopedic complaints. Denies any constitutional symptoms. Patient states she did ambulate yesterday with minimal pain. She does have occasional cramping in her proximal left thigh but no other complaints. Pain is well controlled. Objective:     Patient Vitals for the past 4 hrs:   BP Temp Temp src Pulse Resp SpO2   10/20/21 0415 115/63 99.6 °F (37.6 °C) Oral 94 16 93 %         Physical Exam:     The patient is awake and alert  Resting comfortably in bed    Operative extremity:    The dressing is clean dry and intact  Sensation and motor function intact distally  Leg lengths equal and appropriately aligned  No rotational deformity of lower extremity  Compartment soft compressible    No additional areas of tenderness or pain in the bilateral upper extremities or contralateral lower extremity. Neurovascularly intact throughout bilateral upper extremities and contralateral lower extremity    Brisk capillary refill and negative Homans bilaterally. Compartments are soft and compressible      LABS   CBC:   Recent Labs     10/17/21  2030 10/18/21  0609 10/19/21  1317   WBC 7.3 8.9 10.2   HGB 13.3 11.9* 8.5*    115* 121*           Assessment and Plan     1. POD # 2 left hip intertrochanteric nailing    1:  Physical therapy consult for mobilization   -WBAT  2:  DVT prophylaxis   -May restart Eliquis  3:  Continue Pain Control   -wean to PO meds  4. Medical management per hospitalist service   -Slowly decreasing hemoglobin. May be related to hydration status. Medicine to manage.   Patient does not appear to be symptomatic at this time when I evaluated her clinically this morning. 5:  D/C Planning:     -Per case management. 6.  No further orthopedic intervention at this time. Patient to follow-up in my office in 2 weeks.          Kris Owen DO, MD

## 2021-10-21 LAB
ALBUMIN SERPL-MCNC: 2.5 GM/DL (ref 3.4–5)
ALP BLD-CCNC: 53 IU/L (ref 40–128)
ALT SERPL-CCNC: 42 U/L (ref 10–40)
ANION GAP SERPL CALCULATED.3IONS-SCNC: 6 MMOL/L (ref 4–16)
AST SERPL-CCNC: 43 IU/L (ref 15–37)
BASOPHILS ABSOLUTE: 0 K/CU MM
BASOPHILS RELATIVE PERCENT: 0.3 % (ref 0–1)
BILIRUB SERPL-MCNC: 0.7 MG/DL (ref 0–1)
BUN BLDV-MCNC: 16 MG/DL (ref 6–23)
CALCIUM SERPL-MCNC: 7.2 MG/DL (ref 8.3–10.6)
CHLORIDE BLD-SCNC: 108 MMOL/L (ref 99–110)
CO2: 23 MMOL/L (ref 21–32)
CREAT SERPL-MCNC: 0.6 MG/DL (ref 0.6–1.1)
CULTURE: NORMAL
DIFFERENTIAL TYPE: ABNORMAL
EOSINOPHILS ABSOLUTE: 0.2 K/CU MM
EOSINOPHILS RELATIVE PERCENT: 2.9 % (ref 0–3)
GFR AFRICAN AMERICAN: >60 ML/MIN/1.73M2
GFR NON-AFRICAN AMERICAN: >60 ML/MIN/1.73M2
GLUCOSE BLD-MCNC: 114 MG/DL (ref 70–99)
HCT VFR BLD CALC: 19.2 % (ref 37–47)
HEMOGLOBIN: 6.3 GM/DL (ref 12.5–16)
IMMATURE NEUTROPHIL %: 0.4 % (ref 0–0.43)
LACTIC ACID, SEPSIS: 0.7 MMOL/L (ref 0.5–1.9)
LYMPHOCYTES ABSOLUTE: 0.9 K/CU MM
LYMPHOCYTES RELATIVE PERCENT: 13.6 % (ref 24–44)
Lab: NORMAL
MCH RBC QN AUTO: 32.5 PG (ref 27–31)
MCHC RBC AUTO-ENTMCNC: 32.8 % (ref 32–36)
MCV RBC AUTO: 99 FL (ref 78–100)
MONOCYTES ABSOLUTE: 0.5 K/CU MM
MONOCYTES RELATIVE PERCENT: 6.7 % (ref 0–4)
NUCLEATED RBC %: 0 %
PDW BLD-RTO: 12.8 % (ref 11.7–14.9)
PLATELET # BLD: 89 K/CU MM (ref 140–440)
PMV BLD AUTO: 10.9 FL (ref 7.5–11.1)
POTASSIUM SERPL-SCNC: 3.7 MMOL/L (ref 3.5–5.1)
RBC # BLD: 1.94 M/CU MM (ref 4.2–5.4)
SEGMENTED NEUTROPHILS ABSOLUTE COUNT: 5.2 K/CU MM
SEGMENTED NEUTROPHILS RELATIVE PERCENT: 76.1 % (ref 36–66)
SODIUM BLD-SCNC: 137 MMOL/L (ref 135–145)
SPECIMEN: NORMAL
TOTAL IMMATURE NEUTOROPHIL: 0.03 K/CU MM
TOTAL NUCLEATED RBC: 0 K/CU MM
TOTAL PROTEIN: 4.2 GM/DL (ref 6.4–8.2)
WBC # BLD: 6.9 K/CU MM (ref 4–10.5)

## 2021-10-21 PROCEDURE — 86850 RBC ANTIBODY SCREEN: CPT

## 2021-10-21 PROCEDURE — 6370000000 HC RX 637 (ALT 250 FOR IP): Performed by: STUDENT IN AN ORGANIZED HEALTH CARE EDUCATION/TRAINING PROGRAM

## 2021-10-21 PROCEDURE — 97530 THERAPEUTIC ACTIVITIES: CPT

## 2021-10-21 PROCEDURE — 86900 BLOOD TYPING SEROLOGIC ABO: CPT

## 2021-10-21 PROCEDURE — 6360000002 HC RX W HCPCS: Performed by: HOSPITALIST

## 2021-10-21 PROCEDURE — 86901 BLOOD TYPING SEROLOGIC RH(D): CPT

## 2021-10-21 PROCEDURE — 94150 VITAL CAPACITY TEST: CPT

## 2021-10-21 PROCEDURE — 2700000000 HC OXYGEN THERAPY PER DAY

## 2021-10-21 PROCEDURE — 83605 ASSAY OF LACTIC ACID: CPT

## 2021-10-21 PROCEDURE — 94761 N-INVAS EAR/PLS OXIMETRY MLT: CPT

## 2021-10-21 PROCEDURE — 80048 BASIC METABOLIC PNL TOTAL CA: CPT

## 2021-10-21 PROCEDURE — 97535 SELF CARE MNGMENT TRAINING: CPT

## 2021-10-21 PROCEDURE — 97110 THERAPEUTIC EXERCISES: CPT

## 2021-10-21 PROCEDURE — 85025 COMPLETE CBC W/AUTO DIFF WBC: CPT

## 2021-10-21 PROCEDURE — 6370000000 HC RX 637 (ALT 250 FOR IP): Performed by: INTERNAL MEDICINE

## 2021-10-21 PROCEDURE — 36430 TRANSFUSION BLD/BLD COMPNT: CPT

## 2021-10-21 PROCEDURE — 80053 COMPREHEN METABOLIC PANEL: CPT

## 2021-10-21 PROCEDURE — 36415 COLL VENOUS BLD VENIPUNCTURE: CPT

## 2021-10-21 PROCEDURE — 2580000003 HC RX 258: Performed by: HOSPITALIST

## 2021-10-21 PROCEDURE — P9016 RBC LEUKOCYTES REDUCED: HCPCS

## 2021-10-21 PROCEDURE — 1200000000 HC SEMI PRIVATE

## 2021-10-21 PROCEDURE — 86922 COMPATIBILITY TEST ANTIGLOB: CPT

## 2021-10-21 PROCEDURE — 2580000003 HC RX 258: Performed by: STUDENT IN AN ORGANIZED HEALTH CARE EDUCATION/TRAINING PROGRAM

## 2021-10-21 PROCEDURE — 97116 GAIT TRAINING THERAPY: CPT

## 2021-10-21 RX ORDER — SODIUM CHLORIDE 9 MG/ML
INJECTION, SOLUTION INTRAVENOUS PRN
Status: DISCONTINUED | OUTPATIENT
Start: 2021-10-21 | End: 2021-10-25 | Stop reason: HOSPADM

## 2021-10-21 RX ADMIN — ATORVASTATIN CALCIUM 10 MG: 20 TABLET, FILM COATED ORAL at 10:08

## 2021-10-21 RX ADMIN — SODIUM CHLORIDE, PRESERVATIVE FREE 10 ML: 5 INJECTION INTRAVENOUS at 10:00

## 2021-10-21 RX ADMIN — SENNOSIDES, DOCUSATE SODIUM 1 TABLET: 8.6; 5 TABLET ORAL at 10:08

## 2021-10-21 RX ADMIN — METOPROLOL SUCCINATE 50 MG: 50 TABLET, EXTENDED RELEASE ORAL at 10:08

## 2021-10-21 RX ADMIN — PIPERACILLIN AND TAZOBACTAM 3375 MG: 3; .375 INJECTION, POWDER, LYOPHILIZED, FOR SOLUTION INTRAVENOUS at 02:27

## 2021-10-21 RX ADMIN — ACETAMINOPHEN 650 MG: 325 TABLET ORAL at 10:08

## 2021-10-21 ASSESSMENT — PAIN DESCRIPTION - ORIENTATION: ORIENTATION: LEFT

## 2021-10-21 ASSESSMENT — PAIN DESCRIPTION - FREQUENCY: FREQUENCY: INTERMITTENT

## 2021-10-21 ASSESSMENT — PAIN DESCRIPTION - PROGRESSION: CLINICAL_PROGRESSION: GRADUALLY IMPROVING

## 2021-10-21 ASSESSMENT — PAIN DESCRIPTION - DESCRIPTORS: DESCRIPTORS: ACHING

## 2021-10-21 ASSESSMENT — PAIN DESCRIPTION - LOCATION: LOCATION: HIP

## 2021-10-21 ASSESSMENT — PAIN DESCRIPTION - ONSET: ONSET: ON-GOING

## 2021-10-21 ASSESSMENT — PAIN DESCRIPTION - PAIN TYPE: TYPE: SURGICAL PAIN

## 2021-10-21 ASSESSMENT — PAIN SCALES - GENERAL
PAINLEVEL_OUTOF10: 0

## 2021-10-21 ASSESSMENT — PAIN - FUNCTIONAL ASSESSMENT: PAIN_FUNCTIONAL_ASSESSMENT: PREVENTS OR INTERFERES SOME ACTIVE ACTIVITIES AND ADLS

## 2021-10-21 NOTE — PROGRESS NOTES
Occupational Therapy  . Occupational Therapy Treatment Note      Name: Oleg Masters MRN: [id number] :   1940   Date:  10/21/2021   Admission Date: 10/17/2021 Room:  [address] [place] [address]     Primary Problem:     Restrictions/Precautions:    general precautions, fall risk, WBAT L     Communication with other providers:  Updated nurse Jacob Cutter on patients need for pain med. Subjective:  Patient states: Oh Im sorry Im such a pain  Pain: no rating but cried out when seated on commode.  (location, type, intensity)    Objective:    Observation:  Patient in high fowlers. Call light on wanting to go to the bathroom. Objective Measures: Iv, o2    Treatment, including education:    ADL activity training was instructed today. Cues were given for safety, sequence, UE/LE placement, visual cues, and balance. Activities performed today included dressing, toileting, hand hygiene, and grooming. toileting- SBA for safety d/t not being able to fit buttocks on commode  D/t pain despite cues for safety and appropriate sitting     Therapeutic activity training was instructed today. Cues were given for safety, sequence, UE/LE placement, awareness, and balance. Activities performed today included bed mobility training, sup-sit, sit-stand, SPT. Supine to EOB- Mod A plus cues to advance  BLE. Patient unable to move BLE without pain. EOB to FWW- Min A plus cues to push from bed. Functional mobility- x 10 feet. Close CGA for safety. slow mary. Sit to commode x2- 1st trial patient cried out in pain (patient seated appropriately on commode) Min A for slow decent plus use of grab bars. 2nd trial- patient L side buttocks off toilet, despite cues and Max A on hips attempting to position properly, patient not on toilet properly. Patient required CGA/Min seated on commode for safety. Stand from commode x2-  Mod A plus cues for safety.    patient unable to ambulate after toileting d/t \" not being able to put weight through my leg\" so chair brought up to bathroom door and patient able to SPT to recliner. Min A for slow descent plus cues to reach back and not sit too soon. Stand from [address] A plus cues to push from chair  Boost back in recliner-  Max x2. patient unable to shift hips to scoot back. Patient educated on role of OT , benefits of OT and rationale for therapeutic intervention. All therapeutic intervention performed c emphasis on dynamic balance / standing tolerance to increase strength, endurance and activity tolerance for increased [place] c ADL tasks and func transfers / mobility. Patient left safely in bedside chair at end of session, with call light in reach, alarm on and nursing aware. Gait belt was used for func transfers / mobility.       Assessment / Impression:    Patient's tolerance of treatment: ok  Adverse Reaction: none  Significant change in status and impact:  none  Barriers to improvement: weakness, pain      Plan for Next Session:    Continue with OT POC      Time in:  900  Time out:  938  Timed treatment minutes:  38  Total treatment time:  38      Electronically signed by:    SANTOS Jones COTA/L [id number]  10/21/2021, 9:40 AM

## 2021-10-21 NOTE — PROGRESS NOTES
Physical Therapy    Physical Therapy Treatment Note  Name: Tai Ontiveros MRN: [id number] :   1940   Date:  10/21/2021   Admission Date: 10/17/2021 Room:  [address] [place] [address]   Restrictions/Precautions:        diagnosis was Closed fracture of hip. WBAT L LE, general precautions, fall risk  Communication with other providers:  Per nurse ok to tx  Subjective:  Patient states: Motivated and agreeable to tx. Pain:   Location, Type, Intensity (0/10 to 10/10): Left hip declined need for pain meds and reports no pain at rest and only stinging with activity. Objective:    Observation:  Alert and oriented  Treatment, including education/measures:  Sit to sup mod assist and cues to lift legs into bed. Scooting to [facility][1] max assist and cues using bed rails and right leg to assist.  Sit<=>stand from bed, chair, and commode mod assist and cues. Pt attempted elkin care in sitting after having BM but unable and was dependent for elkin care. amb with rw 10' x 2 with min assist and cues. Pt has very slow step to gt pattern. Ex in sup with written copy:  Trunk stretches with deep breathing  20 reps aps and circles  15 reps quad sets  15 reps glut sets  10 reps heel slides RLR, not tolerated on LLE  10 reps abd/add on RLE and was able to do on LLE with leg  and assist  Safety  Patient left safely in the bed , with call light/phone in reach with alarm applied. Gait belt and mask were used for transfers and gait. Assessment / Impression:       Patient's tolerance of treatment:  good  Adverse Reaction: na  Significant change in status and impact:  na  Barriers to improvement:  Strength and safety  Plan for Next Session:    Cont.  POC  Time in:  1320  Time out:  1420  Timed treatment minutes:  60  Total treatment time:  61    Previously filed items:  Social/Functional History  Lives With: Spouse  Type of Home: House  Home Layout: Two level (Bi-level; 7+7 stairs with 1 HR)  Home Access: Level entry  Bathroom Shower/Tub: Tub/Shower unit  Bathroom Toilet: Standard  Bathroom Equipment: Toilet raiser  Bathroom Accessibility: Accessible  Home Equipment: Standard walker  ADL Assistance: Independent  Homemaking Assistance: Independent  Homemaking Responsibilities: Yes  Ambulation Assistance: Independent (uses no AD)  Transfer Assistance: Independent  Active : Yes  Occupation: Retired  Additional Comments: Pt reports falling from [employer] off my shoe\"  Short term goals  Time Frame for Short term goals: 1 week  Short term goal 1: Pt to complete all bed mobility SBA  Short term goal 2: Pt to complete all STS transfers to/from bed, commode, and chair CGA  Short term goal 3: Pt to ambulate 48' with LRAD CGA  Short term goal 4: Pt to ascend/descend 7 + 7 stairs with HR and CGA when appropriate to simulate home set up       Electronically signed by:    Rolando Mcgrath PTA  10/21/2021, 8:23 AM

## 2021-10-21 NOTE — PROGRESS NOTES
Hospitalist Progress Note      Name:  Talbot Landau /Age/Sex: 1940  (70 y.o. female)   MRN & CSN:  [id number] & [id number] Admission Date/Time: 10/17/2021  8:24 PM   Location:  [address] [place] [address] PCP: Mariette Oppenheim, MD         Hospital Day: 5    Assessment and Plan:   77-year-old female presented for fall found to have a left intertrochanteric femur fracture. Left Intertrochanteric Femoral Fracture  - s/p ORIF 10/18  - Eliquis for DVT prophylaxis     Acute Anemia  - Hgb 6.3 this AM  - Transfusing one unit pRBC today  - All cell lines dropped  - Will hold Eliquis today; possible resume tomorrow pending H/H trend    UTI  Sepsis  Lactic Acidosis  - Started on Zosyn yesterday; UA positive awaiting culture  - Continue IV abx  - Repeat Lactate in AM  - Monitor    HTN  - Continue Metoprolol    Hx Paroxysmal Atrial Fibrillation  - Holding Eliquis for now until repeat Hgb and ensure stable  - Continue Metoprolol    HLD  - Continue Statin    Reason for continued Hospitalization: Monitor h/h after transfusion, PT/OT recs    Diet ADULT DIET; Regular   DVT Prophylaxis [] Lovenox, []  Heparin, [] SCDs, [] Ambulation   GI Prophylaxis [] PPI,  [] H2 Blocker,  [] Carafate,  [] Diet/Tube Feeds   Code Status Full Code   Disposition Patient requires continued admission due to   MDM [] Low, [] Moderate,[]  High  Patient's risk as above due to      History of Present Illness:     Patient resting comfortably in her chair this AM; no chest pains or SOB; Tmax 100.4 overnight; pain controlled    Objective: Intake/Output Summary (Last 24 hours) at 10/21/2021 1249  Last data filed at 10/21/2021 0227  Gross per 24 hour   Intake --   Output 625 ml   Net -625 ml      Vitals:   Vitals:    10/21/21 1004   BP: (!) 134/49   Pulse: 91   Resp: 16   Temp: 98.2 °F (36.8 °C)   SpO2: 96%     Physical Exam:   GEN Awake female, sitting upright in bed in no apparent distress. Appears given age. EYES Pupils are equally round.   No scleral erythema, discharge, or conjunctivitis. HENT Mucous membranes are moist. Oral pharynx without exudates, no evidence of thrush. NECK Supple, no apparent thyromegaly or masses. RESP Clear to auscultation, no wheezes, rales or rhonchi. Symmetric chest movement while on 1L NC  CARDIO/VASC S1/S2 auscultated. Regular rate without appreciable murmurs, rubs, or gallops  GI Abdomen is soft without significant tenderness, masses, or guarding  HEME/LYMPH No petechiae or ecchymoses. MSK No gross joint deformities. SKIN Normal coloration, warm, dry. NEURO Cranial nerves appear grossly intact, normal speech  PSYCH Awake, alert, oriented x 4. Affect appropriate.     Medications:   Medications:    [Held by provider] apixaban  5 mg Oral BID    piperacillin-tazobactam  3,375 mg IntraVENous Q8H    sodium chloride flush  5-40 mL IntraVENous 2 times per day    atorvastatin  10 mg Oral Daily    calcium-cholecalciferol  1 tablet Oral Daily    metoprolol succinate  50 mg Oral Daily    sodium chloride flush  5-40 mL IntraVENous 2 times per day    sodium chloride flush  5-40 mL IntraVENous 2 times per day    sennosides-docusate sodium  1 tablet Oral BID      Infusions:    sodium chloride      sodium chloride      sodium chloride      sodium chloride 25 mL (10/18/21 1834)     PRN Meds: sodium chloride, , PRN  sodium chloride flush, 5-40 mL, PRN  sodium chloride, 25 mL, PRN  ondansetron, 4 mg, Q8H PRN   Or  ondansetron, 4 mg, Q6H PRN  polyethylene glycol, 17 g, Daily PRN  acetaminophen, 650 mg, Q6H PRN   Or  acetaminophen, 650 mg, Q6H PRN  morphine, 4 mg, Q30 Min PRN  sodium chloride flush, 5-40 mL, PRN  sodium chloride, 25 mL, PRN  bisacodyl, 5 mg, Daily PRN  sodium chloride flush, 5-40 mL, PRN  sodium chloride, 25 mL, PRN  magnesium hydroxide, 30 mL, Daily PRN        Electronically signed by Monty Grimm MD on 10/21/2021 at 12:49 PM

## 2021-10-21 NOTE — FLOWSHEET NOTE
Initial pastoral care visit. Patient identifies as Sabianist. Patient did not express any needs at this time. Patient appreciated this  for coming to visit. Spiritual care remains available and will continue to follow up as needed.

## 2021-10-22 LAB
ANION GAP SERPL CALCULATED.3IONS-SCNC: 7 MMOL/L (ref 4–16)
BASOPHILS ABSOLUTE: 0 K/CU MM
BASOPHILS RELATIVE PERCENT: 0.3 % (ref 0–1)
BUN BLDV-MCNC: 15 MG/DL (ref 6–23)
CALCIUM SERPL-MCNC: 7 MG/DL (ref 8.3–10.6)
CHLORIDE BLD-SCNC: 108 MMOL/L (ref 99–110)
CO2: 24 MMOL/L (ref 21–32)
CREAT SERPL-MCNC: 0.6 MG/DL (ref 0.6–1.1)
DIFFERENTIAL TYPE: ABNORMAL
EOSINOPHILS ABSOLUTE: 0.2 K/CU MM
EOSINOPHILS RELATIVE PERCENT: 3.3 % (ref 0–3)
GFR AFRICAN AMERICAN: >60 ML/MIN/1.73M2
GFR NON-AFRICAN AMERICAN: >60 ML/MIN/1.73M2
GLUCOSE BLD-MCNC: 108 MG/DL (ref 70–99)
HCT VFR BLD CALC: 23.2 % (ref 37–47)
HEMOGLOBIN: 7.5 GM/DL (ref 12.5–16)
IMMATURE NEUTROPHIL %: 0.4 % (ref 0–0.43)
LACTATE: 0.8 MMOL/L (ref 0.4–2)
LYMPHOCYTES ABSOLUTE: 0.9 K/CU MM
LYMPHOCYTES RELATIVE PERCENT: 12.6 % (ref 24–44)
MCH RBC QN AUTO: 31.8 PG (ref 27–31)
MCHC RBC AUTO-ENTMCNC: 32.3 % (ref 32–36)
MCV RBC AUTO: 98.3 FL (ref 78–100)
MONOCYTES ABSOLUTE: 0.5 K/CU MM
MONOCYTES RELATIVE PERCENT: 7.3 % (ref 0–4)
NUCLEATED RBC %: 0 %
PDW BLD-RTO: 13.7 % (ref 11.7–14.9)
PLATELET # BLD: 106 K/CU MM (ref 140–440)
PMV BLD AUTO: 10.6 FL (ref 7.5–11.1)
POTASSIUM SERPL-SCNC: 3.5 MMOL/L (ref 3.5–5.1)
RBC # BLD: 2.36 M/CU MM (ref 4.2–5.4)
SEGMENTED NEUTROPHILS ABSOLUTE COUNT: 5.3 K/CU MM
SEGMENTED NEUTROPHILS RELATIVE PERCENT: 76.1 % (ref 36–66)
SODIUM BLD-SCNC: 139 MMOL/L (ref 135–145)
TOTAL IMMATURE NEUTOROPHIL: 0.03 K/CU MM
TOTAL NUCLEATED RBC: 0 K/CU MM
WBC # BLD: 7 K/CU MM (ref 4–10.5)

## 2021-10-22 PROCEDURE — 83605 ASSAY OF LACTIC ACID: CPT

## 2021-10-22 PROCEDURE — 6360000002 HC RX W HCPCS: Performed by: HOSPITALIST

## 2021-10-22 PROCEDURE — 97116 GAIT TRAINING THERAPY: CPT

## 2021-10-22 PROCEDURE — 97530 THERAPEUTIC ACTIVITIES: CPT

## 2021-10-22 PROCEDURE — 97535 SELF CARE MNGMENT TRAINING: CPT

## 2021-10-22 PROCEDURE — 85025 COMPLETE CBC W/AUTO DIFF WBC: CPT

## 2021-10-22 PROCEDURE — 6370000000 HC RX 637 (ALT 250 FOR IP): Performed by: INTERNAL MEDICINE

## 2021-10-22 PROCEDURE — 97110 THERAPEUTIC EXERCISES: CPT

## 2021-10-22 PROCEDURE — 1200000000 HC SEMI PRIVATE

## 2021-10-22 PROCEDURE — 6360000002 HC RX W HCPCS: Performed by: NURSE PRACTITIONER

## 2021-10-22 PROCEDURE — 80048 BASIC METABOLIC PNL TOTAL CA: CPT

## 2021-10-22 PROCEDURE — 2580000003 HC RX 258: Performed by: HOSPITALIST

## 2021-10-22 PROCEDURE — 86900 BLOOD TYPING SEROLOGIC ABO: CPT

## 2021-10-22 PROCEDURE — 36415 COLL VENOUS BLD VENIPUNCTURE: CPT

## 2021-10-22 PROCEDURE — 2580000003 HC RX 258: Performed by: STUDENT IN AN ORGANIZED HEALTH CARE EDUCATION/TRAINING PROGRAM

## 2021-10-22 PROCEDURE — 99024 POSTOP FOLLOW-UP VISIT: CPT | Performed by: STUDENT IN AN ORGANIZED HEALTH CARE EDUCATION/TRAINING PROGRAM

## 2021-10-22 PROCEDURE — 94761 N-INVAS EAR/PLS OXIMETRY MLT: CPT

## 2021-10-22 PROCEDURE — C9113 INJ PANTOPRAZOLE SODIUM, VIA: HCPCS | Performed by: NURSE PRACTITIONER

## 2021-10-22 PROCEDURE — 6370000000 HC RX 637 (ALT 250 FOR IP): Performed by: STUDENT IN AN ORGANIZED HEALTH CARE EDUCATION/TRAINING PROGRAM

## 2021-10-22 RX ORDER — MORPHINE SULFATE 4 MG/ML
4 INJECTION, SOLUTION INTRAMUSCULAR; INTRAVENOUS EVERY 30 MIN PRN
Status: DISCONTINUED | OUTPATIENT
Start: 2021-10-22 | End: 2021-10-22

## 2021-10-22 RX ORDER — PANTOPRAZOLE SODIUM 40 MG/10ML
40 INJECTION, POWDER, LYOPHILIZED, FOR SOLUTION INTRAVENOUS 2 TIMES DAILY
Status: DISCONTINUED | OUTPATIENT
Start: 2021-10-22 | End: 2021-10-25 | Stop reason: HOSPADM

## 2021-10-22 RX ORDER — OXYCODONE HYDROCHLORIDE AND ACETAMINOPHEN 5; 325 MG/1; MG/1
1 TABLET ORAL EVERY 4 HOURS PRN
Status: DISCONTINUED | OUTPATIENT
Start: 2021-10-22 | End: 2021-10-25 | Stop reason: HOSPADM

## 2021-10-22 RX ORDER — SODIUM CHLORIDE 9 MG/ML
INJECTION, SOLUTION INTRAVENOUS PRN
Status: DISCONTINUED | OUTPATIENT
Start: 2021-10-22 | End: 2021-10-25 | Stop reason: HOSPADM

## 2021-10-22 RX ADMIN — Medication 1 TABLET: at 10:04

## 2021-10-22 RX ADMIN — ATORVASTATIN CALCIUM 10 MG: 20 TABLET, FILM COATED ORAL at 10:04

## 2021-10-22 RX ADMIN — APIXABAN 2.5 MG: 2.5 TABLET, FILM COATED ORAL at 20:49

## 2021-10-22 RX ADMIN — SENNOSIDES, DOCUSATE SODIUM 1 TABLET: 8.6; 5 TABLET ORAL at 10:04

## 2021-10-22 RX ADMIN — PANTOPRAZOLE SODIUM 40 MG: 40 INJECTION, POWDER, FOR SOLUTION INTRAVENOUS at 20:55

## 2021-10-22 RX ADMIN — SODIUM CHLORIDE 25 ML: 9 INJECTION, SOLUTION INTRAVENOUS at 10:26

## 2021-10-22 RX ADMIN — PIPERACILLIN AND TAZOBACTAM 3375 MG: 3; .375 INJECTION, POWDER, LYOPHILIZED, FOR SOLUTION INTRAVENOUS at 10:27

## 2021-10-22 RX ADMIN — SENNOSIDES, DOCUSATE SODIUM 1 TABLET: 8.6; 5 TABLET ORAL at 20:49

## 2021-10-22 RX ADMIN — OXYCODONE HYDROCHLORIDE AND ACETAMINOPHEN 1 TABLET: 5; 325 TABLET ORAL at 15:26

## 2021-10-22 RX ADMIN — METOPROLOL SUCCINATE 50 MG: 50 TABLET, EXTENDED RELEASE ORAL at 10:04

## 2021-10-22 RX ADMIN — SODIUM CHLORIDE, PRESERVATIVE FREE 10 ML: 5 INJECTION INTRAVENOUS at 20:53

## 2021-10-22 RX ADMIN — PIPERACILLIN AND TAZOBACTAM 3375 MG: 3; .375 INJECTION, POWDER, LYOPHILIZED, FOR SOLUTION INTRAVENOUS at 03:50

## 2021-10-22 ASSESSMENT — PAIN SCALES - GENERAL
PAINLEVEL_OUTOF10: 8
PAINLEVEL_OUTOF10: 0
PAINLEVEL_OUTOF10: 0

## 2021-10-22 ASSESSMENT — PAIN DESCRIPTION - PROGRESSION
CLINICAL_PROGRESSION: GRADUALLY IMPROVING
CLINICAL_PROGRESSION: GRADUALLY IMPROVING

## 2021-10-22 NOTE — PROGRESS NOTES
Jimmy Noel (1940)    Daily Progress Note-  Ever Dorantes DO, MD                     Today's Date:   10/22/2021          Subjective:     Patient seen and examined resting comfortably in hair this afternoon. Patient is doing well postoperatively, no orthopedic complaints. Denies any constitutional symptoms. Minimal hip pain at this time. No complaints of lightheadedness, dizziness. States PT/OT has gone well. She does have occasional cramping in her proximal left thigh but no other complaints. Objective:     Patient Vitals for the past 4 hrs:   BP Temp Temp src Pulse Resp SpO2 Height   10/22/21 1026 -- -- -- -- -- -- 4' 9.99\" (1.473 m)   10/22/21 1018 129/68 98.3 °F (36.8 °C) Oral 98 16 94 % --         Physical Exam:     The patient is awake and alert  Resting comfortably in bed    Operative extremity:    The dressing is clean dry and intact  Sensation and motor function intact distally  Leg lengths equal and appropriately aligned  No rotational deformity of lower extremity  Compartment soft compressible    No additional areas of tenderness or pain in the bilateral upper extremities or contralateral lower extremity. Neurovascularly intact throughout bilateral upper extremities and contralateral lower extremity    Brisk capillary refill and negative Homans bilaterally. Compartments are soft and compressible      LABS   CBC:   Recent Labs     10/20/21  0853 10/21/21  0050 10/22/21  0656   WBC 9.9 6.9 7.0   HGB 7.5* 6.3* 7.5*   * 89* 106*           Assessment and Plan     1. POD # 4 left hip intertrochanteric nailing    1:  Physical therapy consult for mobilization   -WBAT  2:  DVT prophylaxis   -May restart Eliquis  3:  Continue Pain Control   -wean to PO meds  4.   Medical management per hospitalist service   -Hgb improved today after receiving 1 unit   -Remains asymptomatic on my clinical exam  5:  D/C Planning:     -Per case management. 6.  No further orthopedic intervention at this time.   Patient to follow-up in my office in 2-3 weeks postop         Luma Beckford DO, MD

## 2021-10-22 NOTE — PROGRESS NOTES
Physical Therapy    Physical Therapy Treatment Note  Name: Hina Fuller MRN: [id number] :   1940   Date:  10/22/2021   Admission Date: 10/17/2021 Room:  [address] [place] [address]   Restrictions/Precautions:        diagnosis was Closed fracture of hip. WBAT L LE, general precautions, fall risk  Communication with other providers:  Nurse checking on pain meds  Subjective:  Patient states: Motivated and agreeable to tx  Pain:   Location, Type, Intensity (0/10 to 10/10):  Pt did not rate but did request pain meds. And does guard LLE with gt and ex  Objective:    Observation:  Alert and oriented  Treatment, including education/measures:  Sit to sup mod assist and cues to lift legs into bed. Scooting to [facility] max assist and cues using bed rails and right leg to assist.  Sit<=>stand from bed, chair, and commode mod assist and cues. Pt attempted elkin care in sitting after having BM but unable and was dependent for elkin care. amb with rw 45' x 1, 10' x 1 with min assist and cues. Pt has very slow step to gt pattern. Ex in sup and sitting with written copy:  Trunk stretches with deep breathing  20 reps aps and circles  15 reps quad sets  15 reps glut sets  10 reps heel slides RLR, 5 reps  on LLE with leg  assist  10 reps abd/add on RLE and was able to do 5 reps on LLE with leg  and min assist  Safety  Patient left safely in the bed , with call light/phone in reach with alarm applied. Gait belt and mask were used for transfers and gait. Assessment / Impression:       Patient's tolerance of treatment:  good  Adverse Reaction: na  Significant change in status and impact:  na  Barriers to improvement:  Strength and safety   Plan for Next Session:    Cont.  POC  Time in:  1305  Time out:  1400  Timed treatment minutes:  55  Total treatment time:  54    Previously filed items:  Social/Functional History  Lives With: Spouse  Type of Home: House  Home Layout: Two level (Bi-level; 7+7 stairs with 1 HR)  Home Access: Level entry  Bathroom Shower/Tub: Tub/Shower unit  Bathroom Toilet: Standard  Bathroom Equipment: Toilet raiser  Bathroom Accessibility: Accessible  Home Equipment: Standard walker  ADL Assistance: [address]: Independent  Homemaking Responsibilities: Yes  Ambulation Assistance: Independent (uses no AD)  Transfer Assistance: Independent  Active : Yes  Occupation: Retired  Additional Comments: Pt reports falling from [employer] off my shoe\"  Short term goals  Time Frame for Short term goals: 1 week  Short term goal 1: Pt to complete all bed mobility SBA  Short term goal 2: Pt to complete all STS transfers to/from bed, commode, and chair CGA  Short term goal 3: Pt to ambulate 48' with LRAD CGA  Short term goal 4: Pt to ascend/descend 7 + 7 stairs with HR and CGA when appropriate to simulate home set up       Electronically signed by:    Jazmín Price PTA  10/22/2021, 8:34 AM

## 2021-10-22 NOTE — CONSULTS
[facility] Gastroenterology  Gastroenterology Consultation    10/22/2021  3:20 PM    Patient:    Adam Sam  : 1940   80 y.o. MRN: [id number]  Admitted: 10/17/2021  8:24 PM ATT: Adiel Singh MD   [id number]  AdmitDx: Closed fracture of hip, unspecified laterality, initial encounter ([facility] Janita Nageotte  Intertrochanteric fracture of left femur, closed, initial encounter ([facility] [R52.017P]  PCP: Ramiro Hagan MD    Reason for Consult: Hemoglobin 6.2    Requesting Physician:  Adiel Singh MD      History Obtained From:  Patient and review of all records    HISTORY OF PRESENT ILLNESS:                The patient is a 80 y.o. female with significant past medical history as below who presents with above mentioned causes in reason for consult. Pt presented to [facility] with c/o left sided hip pain after a fall at home. Pt had Femur IM nail jazzy insertion on 10/18/21. Since then, her Hg has dropped from 11.9 to 6.3 today. Pt denies any melana, nausea, vomiting, bloody or tarry stool. BM yesterday was brown and soft. Takes eliquis at home.   Consult initiated for       Past Medical History:        Diagnosis Date    Compression fracture of lumbar spine, non-traumatic ([facility] 2013    Compression fx, thoracic spine (HCC)     T6 - scheduled for vertebroplasty 2015    Degenerative disc disease, lumbar 2013    Environmental allergies     Hx of colonic polyps 2006    Hypertension     Osteoarthritis of lumbar spine     Osteoporosis     PONV (postoperative nausea and vomiting)     \"just with the hernia surgery\"    Spondylolisthesis of lumbar region 2013    Surgical menopause     TIA (transient ischemic attack) 2006    Unspecified cerebral artery occlusion with cerebral infarction     \"had light stroke 6 yrs ago-everything went black and thought I was gone but only lasted about 10 minuts\" no residual now       Past Surgical History: Procedure Laterality Date    CHOLECYSTECTOMY  age 29's    open    COLONOSCOPY  2010    DILATION AND CURETTAGE OF UTERUS  age29's    FEMUR FRACTURE SURGERY Left 10/18/2021    FEMUR IM NAIL PB INSERTION performed by Darrin Rocha DO at [place] [address] Right 2014    HERNIA REPAIR  2011    right ing hernia    HYSTERECTOMY  age 29's    \"took both ovaries\"    TONSILLECTOMY  age 22    VERTEBROPLASTY  8/24/15         Current Medications:    Medications    Scheduled Medications:    apixaban  2.5 mg Oral BID    sodium chloride flush  5-40 mL IntraVENous 2 times per day    atorvastatin  10 mg Oral Daily    calcium-cholecalciferol  1 tablet Oral Daily    metoprolol succinate  50 mg Oral Daily    sodium chloride flush  5-40 mL IntraVENous 2 times per day    sodium chloride flush  5-40 mL IntraVENous 2 times per day    sennosides-docusate sodium  1 tablet Oral BID     PRN Medications: sodium chloride, oxyCODONE-acetaminophen, sodium chloride, sodium chloride flush, sodium chloride, ondansetron **OR** ondansetron, polyethylene glycol, acetaminophen **OR** acetaminophen, sodium chloride flush, sodium chloride, bisacodyl, sodium chloride flush, sodium chloride, magnesium hydroxide      Allergies:  Patient has no known allergies. Social History:   TOBACCO:   reports that she quit smoking about 61 years ago. She has a 6.00 pack-year smoking history. She has never used smokeless tobacco.  ETOH:   reports no history of alcohol use.     Family History:       Problem Relation Age of Onset    Heart Attack Mother         Myocardial Infarction- 76    Heart Disease Mother         MI   Cuate Arboleda Diabetes Mother     High Blood Pressure Mother     Diabetes Sister         Type 2    Other Brother         AAA    Heart Disease Brother         MI    Heart Disease Father         MI    Diabetes Sister     Heart Disease Brother         MI       No family history of colon cancer, Crohn's disease, or ulcerative colitis. REVIEW OF SYSTEMS:    The positive ROS will be identified in bold, otherwise ROS are negative     CONSTITUTIONAL:  Neg   Recent weight changes, fatigue, fever, chills or night sweats  MOUTH/THROAT:  Neg  bleeding gums, hoarseness or sore throat. RESPIRATORY:   Neg SOB, wheeze, cough, sputum, hemoptysis or bronchitis  CARDIOVASCULAR:  Neg chest pain, palpitations, dyspnea on exertion, orthopnea, paroxysmal nocturnal dyspnea or edema  GASTROINTESTINAL:  SEE HPI  GENITOURINARY:  Neg  Urinary frequency, hesitancy, urgency, polyuria, dysuria, hematuria, nocturia, or incontinence. HEMATOLOGIC/LYMPHATIC:  Neg  Anemia, bleeding tendency  MUSCULOSKELETAL:    New myalgias, bone pain, joint pain, swelling or stiffness and has had change in gait. NEUROLOGICAL:  Neg  Loss of Consciousness, memory loss, forgetfulness, periods of confusion, difficulty concentrating, seizures, decline in intellect, nervousness, insomina, aphasia or dysarthria. SKIN:  Neg  skin or hair changes, and has no itching, rashes, or sores. PSYCHIATRIC:  Neg depression, personality changes, anxiety.     PHYSICAL EXAM:      Vitals:    BP (!) 147/71   Pulse 97   Temp 98.8 °F (37.1 °C)   Resp 16   Ht 4' 9.99\" (1.473 m)   Wt 125 lb (56.7 kg)   LMP  (LMP Unknown)   SpO2 93%   BMI 26.13 kg/m²     General Appearance:    Alert, cooperative, no distress, appears stated age   HEENT:    Normocephalic, atraumatic, Conjunctiva clear, Lips, mucosa, and tongue normal; teeth and gums normal   Neck:   Supple, symmetrical, trachea midline   Lungs:     Clear to auscultation bilaterally, respirations unlabored   Chest Wall:    No tenderness or deformity    Heart:    Regular rate and rhythm, S1 and S2 normal, no murmur, rub   or gallop   Abdomen:     Soft, non-tender, bowel sounds active all four quadrants,     no masses, no organomegaly, no ascites    Rectal:    Deferred   Extremities:   Extremities normal, atraumatic, no cyanosis

## 2021-10-22 NOTE — PROGRESS NOTES
Occupational Therapy  . Occupational Therapy Treatment Note      Name: Markie Buenrostro MRN: [id number] :   1940   Date:  10/22/2021   Admission Date: 10/17/2021 Room:  [address] [place] [address]-A     Primary Problem:  The encounter diagnosis was Closed fracture of hip, unspecified laterality, initial encounter ([facility] Restrictions/Precautions:    General Precautions, Fall Risk, WBAT Lt LE,    Communication with other providers:  .Per chart review and Nurse Nicole Mercado, patient is appropriate for therapeutic intervention. Subjective:  Patient states:  \"I need the toilet to make a BM, and I think I may have had some in the bed. \" Pt agreeable to OT Tx session. Pain: 0/10, denies pain at rest, discomfort c mobility (location, type, intensity)    Objective:    Observation:  A&O x4. Pt requires increased time for bed mobility and all functional transfers / mobility and increased time / effort for tasks / mobility. Pt found to have been incontinent of small amount of loose stool on chux pad. Objective Measures: Nguyen Catheter    Treatment, including education:  Therapeutic Activity Training:   Therapeutic activity training was instructed today. Cues were given for safety, sequence, UE/LE placement, awareness, and balance. Activities performed today included bed mobility training, sup-sit, sit-stand, SPT. Supine to sit: Min A for LLE, increased time and effort, cues for sequencing  Scooting: Mod A for successful scoot to EOB s/p pt made attempts to advance to bed using BUE support. Sit to stands: Mod A c RW + increased time / effort, mod cues for safe body positioning / sequencing. Stand to sits: Min A c RW    Functional Mobility: Varied CGA to Min A c RW c increased assistance needed when patient performed step through c RLE. Cues were provided for safe body positioning / sequencing, including increased BUE support on walker handles during advance of RLE and this increased mobility speed.      Standing Balance / Tolerance: Varied CGA / Min A for two stands, 2-3 minutes each. Self Care Training:   Cues were given for safety, sequence, UE/LE placement, visual cues, and balance. Activities performed today included UB bathing / dressing, toileting, hand hygiene, and grooming. UB Bathing: Set up seated  UB Dressing: Min A for tie on gown    Toilet Transfer: Mod A for sit to stand portion, Min A for remainder c RW + mod cues for safe body positioning / sequencing / use of grab bar. Toileting: Dep for clothing mgmt before / after and hygiene following BM    Grooming: Set up seated for hair care and face washing and hand hygiene. Pt c decreased standing tolerance during toileting hygiene and was directed back to bedside chair versus stand at sink at this time. All therapeutic intervention performed c emphasis on dynamic balance / standing tolerance to inc strength, endurance and act tolerance for inc Indep c ADL tasks, func transfers / mobility. Safety  Patient safely in bedside chair + alarm at end of session, with call light/phone in reach, and nurse Abe Jack present for pt care. Gait belt was used for func transfers / mobility. Assessment / Impression:    Patient's tolerance of treatment: Well  Adverse Reaction: None  Significant change in status and impact:  Progressing mobility  Barriers to improvement: Pain, endurance, strength      Plan for Next Session:    Continue per OT POC per patient's tolerance c emphasis on standing balance / tolerance during ADL tasks. Time in:  0944  Time out:  1023  Timed treatment minutes:  39  Total treatment time:  39      Electronically signed by:    JASON Juan  10/22/2021, 9:42 AM    Goals:  1. Pt will complete all aspects of bed mobility for EOB/OOB ADLs min A  2. Pt will complete UB/LB bathing min A with setup using long handled sponge PRN  3. Pt will complete all aspects of LB dressing mod A with setup using AE PRN  4.  Pt will complete all functional transfers to and from bed, chair, toilet, shower chair CGA/good safety awareness  5. Pt will ambulate HH distance to bathroom for toileting CGA with RW  6. Pt will complete all aspects of toileting task CGA  7. Pt will complete oral hygiene/grooming routine in standing at [facility] SBA with setup  8.  Pt will complete ther ex/ther act with focus on UE strengthening and functional standing tolerance >8 minutes

## 2021-10-22 NOTE — PROGRESS NOTES
Comprehensive Nutrition Assessment    Type and Reason for Visit:  Initial    Nutrition Recommendations/Plan:   Add frozen oral nutrition supplement BID   Continue current diet as ordered  Please document all po intake  Will monitor po intake, weight trends, poc    Nutrition Assessment:  Pt admitted for closed fracture of hip, POD #4 s/p ORIF, PMH: HTN, Osteoporosis, Pt currently on regular diet, no po intake documented in the past 72 hr, will follow pt at moderate nutrition risk d/t limited po data    Malnutrition Assessment:  Malnutrition Status:  Insufficient data    Context:  Acute Illness       Estimated Daily Nutrient Needs:  Energy (kcal):  6530-9127 (23-26 kcal/kg); Weight Used for Energy Requirements:  Current     Protein (g):  57-68 (1.0-1.2 g/kg); Weight Used for Protein Requirements:  Current        Fluid (ml/day):  1500; Method Used for Fluid Requirements:  1 ml/kcal      Nutrition Related Findings:  H/H: 7.5/23.2      Wounds:  Surgical Incision       Current Nutrition Therapies:    ADULT DIET; Regular    Anthropometric Measures:  · Height: 4' 9.99\" (147.3 cm)  · Current Body Weight: 125 lb (56.7 kg) (unknown weight source)   · Admission Body Weight:  (n/a)    · Usual Body Weight: 124 lb 5.4 oz (56.4 kg) (9/9/21-unknown weight source)     · Ideal Body Weight: 90 lbs; % Ideal Body Weight 138.9 %   · BMI: 26.1  · BMI Categories: Overweight (BMI 25.0-29. 9)       Nutrition Diagnosis:   · Inadequate oral intake related to acute injury/trauma as evidenced by intake 0-25%    Nutrition Interventions:   Food and/or Nutrient Delivery:  Continue Current Diet, Start Oral Nutrition Supplement  Nutrition Education/Counseling:  No recommendation at this time   Coordination of Nutrition Care:  Continue to monitor while inpatient    Goals:  Pt will consume greater than 50-75% of meals and supplements       Nutrition Monitoring and Evaluation:   Behavioral-Environmental Outcomes:  None Identified   Food/Nutrient Intake Outcomes:  Supplement Intake, Food and Nutrient Intake  Physical Signs/Symptoms Outcomes:  Biochemical Data, Weight, GI Status, Hemodynamic Status, Fluid Status or Edema     Discharge Planning:     Too soon to determine     Electronically signed by Genesis Angulo MS, RD, LD on 10/22/21 at 10:33 AM EDT    Contact: [phone]

## 2021-10-22 NOTE — PROGRESS NOTES
Physician Progress Note      Maurice Sevilla  CSN #:                  [id number]  :                       1940  ADMIT DATE:       10/17/2021 8:24 PM  [address] [place] DATE:  RESPONDING  PROVIDER #:        Michelle Reynolds MD          QUERY TEXT:    //Hospitalists,    Pt admitted with hip fx from fall and is s/p IM nailing. Noted documentation   of sepsis and UT in the post op period. If possible, please document in the   progress notes and discharge summary if you are evaluating and/or treating any   of the following: The medical record reflects the following:  Risk Factors: srinivasan cath  Clinical Indicators: Srinivasan cath placed on 10/17,  no documented urinary   symptoms pre op  Treatment: labs, IV atb, IVF, I&O    Thank you,  Mauri Siddiqui RN CDS  [phone]  Options provided:  -- UTI due to indwelling urinary catheter 10/17/21  -- UTI is suspected to be POA and not due to indwelling urinary catheter  -- Other - I will add my own diagnosis  -- Disagree - Not applicable / Not valid  -- Disagree - Clinically unable to determine / Unknown  -- Refer to Clinical Documentation Reviewer    PROVIDER RESPONSE TEXT:    UTI is suspected to be POS and not due to the indwelling urinary catheter.     Query created by: Louann Alfonso on 10/22/2021 12:56 PM      Electronically signed by:  Michelle Reynolds MD 10/22/2021 2:01 PM

## 2021-10-22 NOTE — PROGRESS NOTES
Hospitalist Progress Note      Name:  Sherie Mckenna /Age/Sex: 1940  (10 y.o. female)   MRN & CSN:  [id number] & [id number] Admission Date/Time: 10/17/2021  8:24 PM   Location:  [address] [place] [address] PCP: West Stephens MD         Hospital Day: 6    Assessment and Plan:   57-year-old female presented for fall found to have a left intertrochanteric femur fracture. Left Intertrochanteric Femoral Fracture  - s/p ORIF 10/18  - Holding DVT prophylaxis for now given acute drop; likely resume this evening    Acute Anemia  - Hgb 6.3 yesterday with appropriate response to 7.5 today  - No dark BM's per bedside nursing and no suspicion for any evidence of bleeding; GI was consulted by overnight team  - Continue daily CBC    UTI  Sepsis  Lactic Acidosis  - Urine culture with no growth but also received abx pre-op  - Received 3 days abx and currently asymptomatic so will d/c abx today    HTN  - Continue Metoprolol    Hx Paroxysmal Atrial Fibrillation  - Holding Eliquis for now until repeat Hgb and ensure stable  - Continue Metoprolol    HLD  - Continue Statin    Reason for continued Hospitalization: Monitor h/h, PT/OT recs    Diet ADULT DIET; Regular  ADULT ORAL NUTRITION SUPPLEMENT; Breakfast, Lunch, Dinner; Fortified Pudding Oral Supplement   DVT Prophylaxis [] Lovenox, []  Heparin, [] SCDs, [] Ambulation   GI Prophylaxis [] PPI,  [] H2 Blocker,  [] Carafate,  [] Diet/Tube Feeds   Code Status Full Code   Disposition Patient requires continued admission due to   MDM [] Low, [] Moderate,[]  High  Patient's risk as above due to      History of Present Illness:     Resting comfortably and doing well; no abdominal pain or n/v    Objective:        Intake/Output Summary (Last 24 hours) at 10/22/2021 1047  Last data filed at 10/22/2021 1016  Gross per 24 hour   Intake --   Output 1200 ml   Net -1200 ml      Vitals:   Vitals:    10/22/21 1018   BP: 129/68   Pulse: 98   Resp: 16   Temp: 98.3 °F (36.8 °C)   SpO2: 94%     Physical Exam:   GEN Awake female, sitting upright in bed in no apparent distress. Appears given age. EYES Pupils are equally round. No scleral erythema, discharge, or conjunctivitis. HENT Mucous membranes are moist. Oral pharynx without exudates, no evidence of thrush. NECK Supple, no apparent thyromegaly or masses. RESP Clear to auscultation, no wheezes, rales or rhonchi. Symmetric chest movement while on 1L NC  CARDIO/VASC S1/S2 auscultated. Regular rate without appreciable murmurs, rubs, or gallops  GI Abdomen is soft without significant tenderness, masses, or guarding  HEME/LYMPH No petechiae or ecchymoses. MSK No gross joint deformities. SKIN Normal coloration, warm, dry. NEURO Cranial nerves appear grossly intact, normal speech  PSYCH Awake, alert, oriented x 4. Affect appropriate.     Medications:   Medications:    [Held by provider] apixaban  5 mg Oral BID    piperacillin-tazobactam  3,375 mg IntraVENous Q8H    sodium chloride flush  5-40 mL IntraVENous 2 times per day    atorvastatin  10 mg Oral Daily    calcium-cholecalciferol  1 tablet Oral Daily    metoprolol succinate  50 mg Oral Daily    sodium chloride flush  5-40 mL IntraVENous 2 times per day    sodium chloride flush  5-40 mL IntraVENous 2 times per day    sennosides-docusate sodium  1 tablet Oral BID      Infusions:    sodium chloride      sodium chloride      sodium chloride      sodium chloride      sodium chloride 25 mL (10/22/21 1026)     PRN Meds: sodium chloride, , PRN  sodium chloride, , PRN  sodium chloride flush, 5-40 mL, PRN  sodium chloride, 25 mL, PRN  ondansetron, 4 mg, Q8H PRN   Or  ondansetron, 4 mg, Q6H PRN  polyethylene glycol, 17 g, Daily PRN  acetaminophen, 650 mg, Q6H PRN   Or  acetaminophen, 650 mg, Q6H PRN  morphine, 4 mg, Q30 Min PRN  sodium chloride flush, 5-40 mL, PRN  sodium chloride, 25 mL, PRN  bisacodyl, 5 mg, Daily PRN  sodium chloride flush, 5-40 mL, PRN  sodium chloride, 25 mL, PRN  magnesium hydroxide, 30 mL, Daily PRN        Electronically signed by Rochelle Burnett MD on 10/22/2021 at 10:47 AM

## 2021-10-23 LAB
ANION GAP SERPL CALCULATED.3IONS-SCNC: 8 MMOL/L (ref 4–16)
BUN BLDV-MCNC: 14 MG/DL (ref 6–23)
CALCIUM SERPL-MCNC: 7.6 MG/DL (ref 8.3–10.6)
CHLORIDE BLD-SCNC: 107 MMOL/L (ref 99–110)
CO2: 26 MMOL/L (ref 21–32)
CREAT SERPL-MCNC: 0.5 MG/DL (ref 0.6–1.1)
FERRITIN: 184 NG/ML (ref 15–150)
GFR AFRICAN AMERICAN: >60 ML/MIN/1.73M2
GFR NON-AFRICAN AMERICAN: >60 ML/MIN/1.73M2
GLUCOSE BLD-MCNC: 110 MG/DL (ref 70–99)
HCT VFR BLD CALC: 24.6 % (ref 37–47)
HEMOGLOBIN: 7.6 GM/DL (ref 12.5–16)
IRON: 192 UG/DL (ref 37–145)
PCT TRANSFERRIN: 98 % (ref 10–44)
POTASSIUM SERPL-SCNC: 3.9 MMOL/L (ref 3.5–5.1)
SODIUM BLD-SCNC: 141 MMOL/L (ref 135–145)
TOTAL IRON BINDING CAPACITY: 196 UG/DL (ref 250–450)
UNSATURATED IRON BINDING CAPACITY: 4 UG/DL (ref 110–370)

## 2021-10-23 PROCEDURE — 6370000000 HC RX 637 (ALT 250 FOR IP): Performed by: INTERNAL MEDICINE

## 2021-10-23 PROCEDURE — C9113 INJ PANTOPRAZOLE SODIUM, VIA: HCPCS | Performed by: NURSE PRACTITIONER

## 2021-10-23 PROCEDURE — 85018 HEMOGLOBIN: CPT

## 2021-10-23 PROCEDURE — 2580000003 HC RX 258: Performed by: STUDENT IN AN ORGANIZED HEALTH CARE EDUCATION/TRAINING PROGRAM

## 2021-10-23 PROCEDURE — 85014 HEMATOCRIT: CPT

## 2021-10-23 PROCEDURE — 97530 THERAPEUTIC ACTIVITIES: CPT

## 2021-10-23 PROCEDURE — 6360000002 HC RX W HCPCS: Performed by: INTERNAL MEDICINE

## 2021-10-23 PROCEDURE — 80048 BASIC METABOLIC PNL TOTAL CA: CPT

## 2021-10-23 PROCEDURE — 97116 GAIT TRAINING THERAPY: CPT

## 2021-10-23 PROCEDURE — 36415 COLL VENOUS BLD VENIPUNCTURE: CPT

## 2021-10-23 PROCEDURE — 94150 VITAL CAPACITY TEST: CPT

## 2021-10-23 PROCEDURE — 82728 ASSAY OF FERRITIN: CPT

## 2021-10-23 PROCEDURE — 94761 N-INVAS EAR/PLS OXIMETRY MLT: CPT

## 2021-10-23 PROCEDURE — 83550 IRON BINDING TEST: CPT

## 2021-10-23 PROCEDURE — 97110 THERAPEUTIC EXERCISES: CPT

## 2021-10-23 PROCEDURE — 6360000002 HC RX W HCPCS: Performed by: NURSE PRACTITIONER

## 2021-10-23 PROCEDURE — 1200000000 HC SEMI PRIVATE

## 2021-10-23 PROCEDURE — 2700000000 HC OXYGEN THERAPY PER DAY

## 2021-10-23 PROCEDURE — 83540 ASSAY OF IRON: CPT

## 2021-10-23 PROCEDURE — 85025 COMPLETE CBC W/AUTO DIFF WBC: CPT

## 2021-10-23 PROCEDURE — 2580000003 HC RX 258: Performed by: INTERNAL MEDICINE

## 2021-10-23 RX ADMIN — Medication 1 TABLET: at 09:26

## 2021-10-23 RX ADMIN — OXYCODONE HYDROCHLORIDE AND ACETAMINOPHEN 1 TABLET: 5; 325 TABLET ORAL at 09:39

## 2021-10-23 RX ADMIN — PANTOPRAZOLE SODIUM 40 MG: 40 INJECTION, POWDER, FOR SOLUTION INTRAVENOUS at 09:27

## 2021-10-23 RX ADMIN — METOPROLOL SUCCINATE 50 MG: 50 TABLET, EXTENDED RELEASE ORAL at 09:25

## 2021-10-23 RX ADMIN — SODIUM CHLORIDE, PRESERVATIVE FREE 10 ML: 5 INJECTION INTRAVENOUS at 21:18

## 2021-10-23 RX ADMIN — APIXABAN 2.5 MG: 2.5 TABLET, FILM COATED ORAL at 21:18

## 2021-10-23 RX ADMIN — ATORVASTATIN CALCIUM 10 MG: 20 TABLET, FILM COATED ORAL at 09:26

## 2021-10-23 RX ADMIN — IRON SUCROSE 300 MG: 20 INJECTION, SOLUTION INTRAVENOUS at 10:38

## 2021-10-23 RX ADMIN — SODIUM CHLORIDE, PRESERVATIVE FREE 10 ML: 5 INJECTION INTRAVENOUS at 09:28

## 2021-10-23 RX ADMIN — PANTOPRAZOLE SODIUM 40 MG: 40 INJECTION, POWDER, FOR SOLUTION INTRAVENOUS at 21:17

## 2021-10-23 RX ADMIN — APIXABAN 2.5 MG: 2.5 TABLET, FILM COATED ORAL at 09:25

## 2021-10-23 RX ADMIN — ONDANSETRON 4 MG: 2 INJECTION INTRAMUSCULAR; INTRAVENOUS at 13:36

## 2021-10-23 ASSESSMENT — PAIN SCALES - GENERAL
PAINLEVEL_OUTOF10: 0
PAINLEVEL_OUTOF10: 0
PAINLEVEL_OUTOF10: 7

## 2021-10-23 NOTE — CARE COORDINATION
Call from therapy with concerns about Pt being able to return home. LSW f/u with Pt in the room. Pt stated that she talked to her spouse and she believes that if would be best for her to return home. Pt does not thinks she needs SNF at this time. LSW discussed the concerns and Pt believes that she will get help with home care and avoid SNF.

## 2021-10-23 NOTE — PROGRESS NOTES
female, sitting upright in bed in no apparent distress. Appears given age. EYES Pupils are equally round. No scleral erythema, discharge, or conjunctivitis. HENT Mucous membranes are moist. Oral pharynx without exudates, no evidence of thrush. NECK Supple, no apparent thyromegaly or masses. RESP Clear to auscultation, no wheezes, rales or rhonchi. Symmetric chest movement while on 1L NC  CARDIO/VASC S1/S2 auscultated. Regular rate without appreciable murmurs, rubs, or gallops  GI Abdomen is soft without significant tenderness, masses, or guarding  HEME/LYMPH No petechiae or ecchymoses. MSK No gross joint deformities. SKIN Normal coloration, warm, dry. NEURO Cranial nerves appear grossly intact, normal speech  PSYCH Awake, alert, oriented x 4. Affect appropriate.     Medications:   Medications:    iron sucrose  300 mg IntraVENous Q24H    apixaban  2.5 mg Oral BID    pantoprazole  40 mg IntraVENous BID    sodium chloride flush  5-40 mL IntraVENous 2 times per day    atorvastatin  10 mg Oral Daily    calcium-cholecalciferol  1 tablet Oral Daily    metoprolol succinate  50 mg Oral Daily    sodium chloride flush  5-40 mL IntraVENous 2 times per day    sodium chloride flush  5-40 mL IntraVENous 2 times per day    sennosides-docusate sodium  1 tablet Oral BID      Infusions:    sodium chloride      sodium chloride      sodium chloride      sodium chloride      sodium chloride 25 mL (10/22/21 1026)     PRN Meds: sodium chloride, , PRN  oxyCODONE-acetaminophen, 1 tablet, Q4H PRN  sodium chloride, , PRN  sodium chloride flush, 5-40 mL, PRN  sodium chloride, 25 mL, PRN  ondansetron, 4 mg, Q8H PRN   Or  ondansetron, 4 mg, Q6H PRN  polyethylene glycol, 17 g, Daily PRN  acetaminophen, 650 mg, Q6H PRN   Or  acetaminophen, 650 mg, Q6H PRN  sodium chloride flush, 5-40 mL, PRN  sodium chloride, 25 mL, PRN  bisacodyl, 5 mg, Daily PRN  sodium chloride flush, 5-40 mL, PRN  sodium chloride, 25 mL, PRN  magnesium hydroxide, 30 mL, Daily PRN        Electronically signed by Manuelito Grady MD on 10/23/2021 at 1:50 PM

## 2021-10-23 NOTE — PROGRESS NOTES
Physical Therapy    Physical Therapy Treatment Note  Name: Eugenie Keene MRN: [id number] :   1940   Date:  10/23/2021   Admission Date: 10/17/2021 Room:  [address] [place] [address]   Restrictions/Precautions: general precautions; fall risk; WBAT LLE  Communication with other providers:  RN handoff provided  Subjective:  Patient states:  Wow I marie pretty far today  Pain:   Location, Type, Intensity (0/10 to 10/10): Denies pain during functional mobility; endorses pain during therapetic exercise completion, does not rate  Objective:    Observation:  Pt presents supine, awake in bed on arrival. She is pleasant, alert, agreeable to therapy. Pocket telemetry, bed alarm in place. Treatment, including education/measures:  Therapeutic Activity Training:   Therapeutic activity training was instructed today. Cues were given for safety, sequence, UE/LE placement, awareness, and balance. Activities performed today included bed mobility training, sup-sit, sit-stand, SPT. Supine <->sit: min A; completion of trunk transition  Seated balance: good  Sit <->stand: min A for completion ; min vc/tc to improve posture, sequencing  Standing static balance: fair; reliant initially on BL UE support, improves w/ time spent WB through affected LE ; pre gait activities initiated soon after  Stand <-> sit: CGA    Gait Training:  Cues were given for safety, sequence, device management, balance, posture, awareness, path. Pt ambulates 35 ft using FWW w/ CGA progressing to SBA ; gait is slow, dec step height/stride length, step to pattern. W/ onset of fatigue, pt demonstrates inc postural sway, further dec mary, shuffling pattern, onset of light nausea    Therapeutic Exercise:  Cues were given for technique, safety, recruitment, and rationale. Cues were verbal and/or tactile.   10 reps of ankle pumps, LAQ's + iso hold, glute/quad sets, marches  Pt notes inc nausea and tx session is terminated at this time ; RN handoff provided    Assessment / Impression:    Initiated functional mobility, balance, transfer, gait, and strength training this session. Pt demonstrates positive response to therapeutic intervention 2/2 inc ambulation distance. Will cont to benefit from skilled acute care therapy. See plan for more info regarding discussion w/ pt regarding DC plan.       Patient's tolerance of treatment:  Fair  Barriers to improvement:  Nausea w/ inc functional mobility  Plan for Next Session:    Cont w/ est POC and DC plan  Pt wants to return home, educated on need for more functional mobility and therapeutic activities to inc patient independence; indecision communicated to CM; no notes from CM regarding DC planning since 10/19, communication needs to be initiated to better involve pt in DC plan    Time in:  1243  Time out:  1321  Timed treatment minutes:  38  Total treatment time:  38 (TA, GT, TE)    Previously filed items:  Social/Functional History  Lives With: Spouse  Type of Home: House  Home Layout: Two level (Bi-level; 7+7 stairs with 1 HR)  Home Access: Level entry  Bathroom Shower/Tub: Tub/Shower unit  Bathroom Toilet: Standard  Bathroom Equipment: Toilet raiser  Bathroom Accessibility: Accessible  Home Equipment: Standard walker  ADL Assistance: Independent  Homemaking Assistance: Independent  Homemaking Responsibilities: Yes  Ambulation Assistance: Independent (uses no AD)  Transfer Assistance: Independent  Active : Yes  Occupation: Retired  Additional Comments: Pt reports falling from [employer] off my shoe\"  Short term goals  Time Frame for Short term goals: 1 week  Short term goal 1: Pt to complete all bed mobility SBA  Short term goal 2: Pt to complete all STS transfers to/from bed, commode, and chair CGA  Short term goal 3: Pt to ambulate 48' with LRAD CGA  Short term goal 4: Pt to ascend/descend 7 + 7 stairs with HR and CGA when appropriate to simulate home set up       Electronically signed by:    Isabell Mackey PT, DPT  10/23/2021, 3:04 PM

## 2021-10-24 LAB
ANION GAP SERPL CALCULATED.3IONS-SCNC: 5 MMOL/L (ref 4–16)
BASOPHILS ABSOLUTE: 0 K/CU MM
BASOPHILS RELATIVE PERCENT: 0.4 % (ref 0–1)
BUN BLDV-MCNC: 11 MG/DL (ref 6–23)
CALCIUM SERPL-MCNC: 7.5 MG/DL (ref 8.3–10.6)
CHLORIDE BLD-SCNC: 106 MMOL/L (ref 99–110)
CO2: 27 MMOL/L (ref 21–32)
CREAT SERPL-MCNC: 0.5 MG/DL (ref 0.6–1.1)
DIFFERENTIAL TYPE: ABNORMAL
EOSINOPHILS ABSOLUTE: 0.3 K/CU MM
EOSINOPHILS RELATIVE PERCENT: 3.5 % (ref 0–3)
GFR AFRICAN AMERICAN: >60 ML/MIN/1.73M2
GFR NON-AFRICAN AMERICAN: >60 ML/MIN/1.73M2
GLUCOSE BLD-MCNC: 108 MG/DL (ref 70–99)
HCT VFR BLD CALC: 24.4 % (ref 37–47)
HEMOGLOBIN: 7.7 GM/DL (ref 12.5–16)
IMMATURE NEUTROPHIL %: 1 % (ref 0–0.43)
LYMPHOCYTES ABSOLUTE: 0.9 K/CU MM
LYMPHOCYTES RELATIVE PERCENT: 11.7 % (ref 24–44)
MCH RBC QN AUTO: 31.7 PG (ref 27–31)
MCHC RBC AUTO-ENTMCNC: 31.6 % (ref 32–36)
MCV RBC AUTO: 100.4 FL (ref 78–100)
MONOCYTES ABSOLUTE: 0.7 K/CU MM
MONOCYTES RELATIVE PERCENT: 8.7 % (ref 0–4)
NUCLEATED RBC %: 0.3 %
PDW BLD-RTO: 13.8 % (ref 11.7–14.9)
PLATELET # BLD: 169 K/CU MM (ref 140–440)
PMV BLD AUTO: 10.5 FL (ref 7.5–11.1)
POTASSIUM SERPL-SCNC: 3.7 MMOL/L (ref 3.5–5.1)
RBC # BLD: 2.43 M/CU MM (ref 4.2–5.4)
SEGMENTED NEUTROPHILS ABSOLUTE COUNT: 5.7 K/CU MM
SEGMENTED NEUTROPHILS RELATIVE PERCENT: 74.7 % (ref 36–66)
SODIUM BLD-SCNC: 138 MMOL/L (ref 135–145)
TOTAL IMMATURE NEUTOROPHIL: 0.08 K/CU MM
TOTAL NUCLEATED RBC: 0 K/CU MM
WBC # BLD: 7.7 K/CU MM (ref 4–10.5)

## 2021-10-24 PROCEDURE — 85025 COMPLETE CBC W/AUTO DIFF WBC: CPT

## 2021-10-24 PROCEDURE — 6370000000 HC RX 637 (ALT 250 FOR IP): Performed by: INTERNAL MEDICINE

## 2021-10-24 PROCEDURE — 1200000000 HC SEMI PRIVATE

## 2021-10-24 PROCEDURE — C9113 INJ PANTOPRAZOLE SODIUM, VIA: HCPCS | Performed by: NURSE PRACTITIONER

## 2021-10-24 PROCEDURE — 2580000003 HC RX 258: Performed by: INTERNAL MEDICINE

## 2021-10-24 PROCEDURE — 94150 VITAL CAPACITY TEST: CPT

## 2021-10-24 PROCEDURE — 97110 THERAPEUTIC EXERCISES: CPT

## 2021-10-24 PROCEDURE — 2580000003 HC RX 258: Performed by: STUDENT IN AN ORGANIZED HEALTH CARE EDUCATION/TRAINING PROGRAM

## 2021-10-24 PROCEDURE — 6360000002 HC RX W HCPCS: Performed by: NURSE PRACTITIONER

## 2021-10-24 PROCEDURE — 80048 BASIC METABOLIC PNL TOTAL CA: CPT

## 2021-10-24 PROCEDURE — 94761 N-INVAS EAR/PLS OXIMETRY MLT: CPT

## 2021-10-24 PROCEDURE — 97530 THERAPEUTIC ACTIVITIES: CPT

## 2021-10-24 PROCEDURE — 36415 COLL VENOUS BLD VENIPUNCTURE: CPT

## 2021-10-24 PROCEDURE — 6360000002 HC RX W HCPCS: Performed by: INTERNAL MEDICINE

## 2021-10-24 PROCEDURE — 97116 GAIT TRAINING THERAPY: CPT

## 2021-10-24 RX ADMIN — ATORVASTATIN CALCIUM 10 MG: 20 TABLET, FILM COATED ORAL at 10:13

## 2021-10-24 RX ADMIN — PANTOPRAZOLE SODIUM 40 MG: 40 INJECTION, POWDER, FOR SOLUTION INTRAVENOUS at 21:05

## 2021-10-24 RX ADMIN — PANTOPRAZOLE SODIUM 40 MG: 40 INJECTION, POWDER, FOR SOLUTION INTRAVENOUS at 10:12

## 2021-10-24 RX ADMIN — IRON SUCROSE 300 MG: 20 INJECTION, SOLUTION INTRAVENOUS at 10:21

## 2021-10-24 RX ADMIN — SODIUM CHLORIDE, PRESERVATIVE FREE 10 ML: 5 INJECTION INTRAVENOUS at 21:05

## 2021-10-24 RX ADMIN — APIXABAN 2.5 MG: 2.5 TABLET, FILM COATED ORAL at 21:05

## 2021-10-24 RX ADMIN — SODIUM CHLORIDE, PRESERVATIVE FREE 10 ML: 5 INJECTION INTRAVENOUS at 21:06

## 2021-10-24 RX ADMIN — Medication 1 TABLET: at 10:13

## 2021-10-24 RX ADMIN — APIXABAN 2.5 MG: 2.5 TABLET, FILM COATED ORAL at 10:12

## 2021-10-24 RX ADMIN — METOPROLOL SUCCINATE 50 MG: 50 TABLET, EXTENDED RELEASE ORAL at 10:13

## 2021-10-24 ASSESSMENT — PAIN SCALES - GENERAL
PAINLEVEL_OUTOF10: 0
PAINLEVEL_OUTOF10: 0

## 2021-10-24 NOTE — PROGRESS NOTES
Physical Therapy    Physical Therapy Treatment Note  Name: Brock White MRN: [id number] :   1940   Date:  10/24/2021   Admission Date: 10/17/2021 Room:  [address] [place] [address]   Restrictions/Precautions:  General precautions; fall risk; WBAT on LLE  Communication with other providers:  RN handoff  Subjective:  Patient states:  I think I would do better If I do this at home. Pain:   Location, Type, Intensity (0/10 to 10/10):  5/10 L hip pain during WB; acute, discomfort, dull  Objective:    Observation:  Pt presents seated, awake, alert on arrival. Agreeable to therapy, pleasant t/o. Pt is intermittently forgetful, but demonstrates safe functional mobility w/ min cue t/o. Dressing site is damp w/ serosanguinous exudate, RN alerted of need for dressing change. Treatment, including education/measures:  Therapeutic Activity Training:   Therapeutic activity training was instructed today. Cues were given for safety, sequence, UE/LE placement, awareness, and balance. Activities performed today included bed mobility training, sup-sit, sit-stand, SPT. Supine <-> sit: mod A for trunk transition and LE mgmt  Seated balance: good  Sit <-> stand: min A progressing to CGA w/ min vc/tc for improved set-up sequencing  Standing balance: fair; reliant on BL UE suppport  Stand to sit: CGA  Sit <-> supine: min A for LE mgmt on bed entrance at end of session 2/2 fatigue and inc pain  Scooting: min A  Standing balance activities initiated include dynamic reaching and static standing accepting perturbations  Inc time for completion of all functional mobility ; assisted to and from bathroom for successful BM, loose in quality    Gait Training:  Cues were given for safety, sequence, device management, balance, posture, awareness, path.     Pt ambulates 10 ft+20 ft+10 ft using FWW w/ CGA; slow mary, inc RAMÍREZ, dec step height ; min cues to improve sequencing and set-up; LLE fatigue limiting further mobility this session    Therapeutic Exercise:  Cues were given for technique, safety, recruitment, and rationale. Cues were verbal and/or tactile. 10 reps of ankle pumps/toe taps, marches, LAQ's + iso hold, clamshells, glute squeezes  Intermittent rest breaks to attend to fatigue    Assessment / Impression:    Initiated functional mobility, balance, transfer, gait, and strength training this session. Pt tolerating well, positive response noted as evidence by inc ambulation distance this PM. Will cont to benefit from skilled therapy services to help safely transition home; still operating well below her baseline.      Patient's tolerance of treatment:  good  Barriers to improvement:  None observed  Plan for Next Session:    Cont w/ est POC and DC planning  Still awaiting further CM consult to aide in DC planning; pt hopeful to return home; cont to recommend ARU    Time in:  1720  Time out:  1800  Timed treatment minutes:  40  Total treatment time:  40 (TA, TE, GT)    Previously filed items:  Social/Functional History  Lives With: Spouse  Type of Home: House  Home Layout: Two level (Bi-level; 7+7 stairs with 1 HR)  Home Access: Level entry  Bathroom Shower/Tub: Tub/Shower unit  Bathroom Toilet: Standard  Bathroom Equipment: Toilet raiser  Bathroom Accessibility: Accessible  Home Equipment: Standard walker  ADL Assistance: Independent  Homemaking Assistance: Independent  Homemaking Responsibilities: Yes  Ambulation Assistance: Independent (uses no AD)  Transfer Assistance: Independent  Active : Yes  Occupation: Retired  Additional Comments: Pt reports falling from [employer] off my shoe\"  Short term goals  Time Frame for Short term goals: 1 week  Short term goal 1: Pt to complete all bed mobility SBA  Short term goal 2: Pt to complete all STS transfers to/from bed, commode, and chair CGA  Short term goal 3: Pt to ambulate 48' with LRAD CGA  Short term goal 4: Pt to ascend/descend 7 + 7 stairs with HR and CGA when appropriate to simulate home set up       Electronically signed by:    Sandy Brown PT, DPT  10/24/2021, 6:04 PM

## 2021-10-24 NOTE — PROGRESS NOTES
Hospitalist Progress Note      Name:  Sandi Mitchell /Age/Sex: 1940  (48 y.o. female)   MRN & CSN:  [id number] & [id number] Admission Date/Time: 10/17/2021  8:24 PM   Location:  [address] [place] [address] PCP: Daquan Denson MD         Hospital Day: 8    Assessment and Plan:   59-year-old female presented for fall found to have a left intertrochanteric femur fracture. Left Intertrochanteric Femoral Fracture  s/p ORIF 10/18  Continue Eliquis  PT OT  Case management for discharge planning    Acute Anemia  Hgb 7.7  s/p one unit transfusion during this admission  no evidence of bleeding so Eliquis was continued  Protonix daily  Monitor H&H    HTN  Continue Metoprolol    Paroxysmal Atrial Fibrillation  On Eliquis  Continue Metoprolol      Reason for continued Hospitalization: SNF placement     Diet ADULT DIET; Regular  ADULT ORAL NUTRITION SUPPLEMENT; Breakfast, Lunch, Dinner; Fortified Pudding Oral Supplement   DVT Prophylaxis [] Lovenox, []  Heparin, [] SCDs, [] Ambulation   GI Prophylaxis [] PPI,  [] H2 Blocker,  [] Carafate,  [] Diet/Tube Feeds   Code Status Full Code   Disposition Patient requires continued admission due to   MDM [] Low, [] Moderate,[]  High  Patient's risk as above due to      History of Present Illness:     [facility] seen and examined at bedside; no active complaints. no abdominal pain or n/v; no dark bowel movements    Objective: Intake/Output Summary (Last 24 hours) at 10/24/2021 1434  Last data filed at 10/24/2021 0725  Gross per 24 hour   Intake --   Output 1400 ml   Net -1400 ml      Vitals:   Vitals:    10/24/21 0957   BP: (!) 145/58   Pulse: 89   Resp: 16   Temp: 99.1 °F (37.3 °C)   SpO2: (!) 2%     Physical Exam:   GEN Awake female, sitting upright in bed in no apparent distress. Appears given age. EYES Pupils are equally round. No scleral erythema, discharge, or conjunctivitis. HENT Mucous membranes are moist. Oral pharynx without exudates, no evidence of thrush.   NECK Supple, no apparent thyromegaly or masses. RESP Clear to auscultation, no wheezes, rales or rhonchi. Symmetric chest movement while on 1L NC  CARDIO/VASC S1/S2 auscultated. Regular rate without appreciable murmurs, rubs, or gallops  GI Abdomen is soft without significant tenderness, masses, or guarding  HEME/LYMPH No petechiae or ecchymoses. MSK No gross joint deformities. SKIN Normal coloration, warm, dry. NEURO Cranial nerves appear grossly intact, normal speech  PSYCH Awake, alert, oriented x 4. Affect appropriate.     Medications:   Medications:    apixaban  2.5 mg Oral BID    pantoprazole  40 mg IntraVENous BID    sodium chloride flush  5-40 mL IntraVENous 2 times per day    atorvastatin  10 mg Oral Daily    calcium-cholecalciferol  1 tablet Oral Daily    metoprolol succinate  50 mg Oral Daily    sodium chloride flush  5-40 mL IntraVENous 2 times per day    sodium chloride flush  5-40 mL IntraVENous 2 times per day    sennosides-docusate sodium  1 tablet Oral BID      Infusions:    sodium chloride      sodium chloride      sodium chloride      sodium chloride      sodium chloride 25 mL (10/22/21 1026)     PRN Meds: sodium chloride, , PRN  oxyCODONE-acetaminophen, 1 tablet, Q4H PRN  sodium chloride, , PRN  sodium chloride flush, 5-40 mL, PRN  sodium chloride, 25 mL, PRN  ondansetron, 4 mg, Q8H PRN   Or  ondansetron, 4 mg, Q6H PRN  polyethylene glycol, 17 g, Daily PRN  acetaminophen, 650 mg, Q6H PRN   Or  acetaminophen, 650 mg, Q6H PRN  sodium chloride flush, 5-40 mL, PRN  sodium chloride, 25 mL, PRN  bisacodyl, 5 mg, Daily PRN  sodium chloride flush, 5-40 mL, PRN  sodium chloride, 25 mL, PRN  magnesium hydroxide, 30 mL, Daily PRN      Electronically signed by Katharine Hopkins MD on 10/24/2021 at 2:34 PM

## 2021-10-25 ENCOUNTER — TELEPHONE (OUTPATIENT)
Dept: FAMILY MEDICINE CLINIC | Age: 81
End: 2021-10-25

## 2021-10-25 VITALS
BODY MASS INDEX: 28.83 KG/M2 | SYSTOLIC BLOOD PRESSURE: 140 MMHG | WEIGHT: 137.35 LBS | TEMPERATURE: 98.7 F | HEART RATE: 87 BPM | OXYGEN SATURATION: 96 % | DIASTOLIC BLOOD PRESSURE: 68 MMHG | HEIGHT: 58 IN | RESPIRATION RATE: 16 BRPM

## 2021-10-25 LAB
ABO/RH: NORMAL
ANION GAP SERPL CALCULATED.3IONS-SCNC: 11 MMOL/L (ref 4–16)
ANTIBODY SCREEN: NEGATIVE
BASOPHILS ABSOLUTE: 0 K/CU MM
BASOPHILS RELATIVE PERCENT: 0.3 % (ref 0–1)
BUN BLDV-MCNC: 12 MG/DL (ref 6–23)
CALCIUM SERPL-MCNC: 8.1 MG/DL (ref 8.3–10.6)
CHLORIDE BLD-SCNC: 104 MMOL/L (ref 99–110)
CO2: 23 MMOL/L (ref 21–32)
COMPONENT: NORMAL
COMPONENT: NORMAL
CREAT SERPL-MCNC: 0.5 MG/DL (ref 0.6–1.1)
CROSSMATCH RESULT: NORMAL
CROSSMATCH RESULT: NORMAL
CULTURE: NORMAL
DIFFERENTIAL TYPE: ABNORMAL
EOSINOPHILS ABSOLUTE: 0.2 K/CU MM
EOSINOPHILS RELATIVE PERCENT: 1.6 % (ref 0–3)
GFR AFRICAN AMERICAN: >60 ML/MIN/1.73M2
GFR NON-AFRICAN AMERICAN: >60 ML/MIN/1.73M2
GLUCOSE BLD-MCNC: 140 MG/DL (ref 70–99)
HCT VFR BLD CALC: 29 % (ref 37–47)
HEMOGLOBIN: 9.2 GM/DL (ref 12.5–16)
IMMATURE NEUTROPHIL %: 1.8 % (ref 0–0.43)
LYMPHOCYTES ABSOLUTE: 1.4 K/CU MM
LYMPHOCYTES RELATIVE PERCENT: 11.2 % (ref 24–44)
Lab: NORMAL
MCH RBC QN AUTO: 31.6 PG (ref 27–31)
MCHC RBC AUTO-ENTMCNC: 31.7 % (ref 32–36)
MCV RBC AUTO: 99.7 FL (ref 78–100)
MONOCYTES ABSOLUTE: 0.7 K/CU MM
MONOCYTES RELATIVE PERCENT: 6 % (ref 0–4)
NUCLEATED RBC %: 0.4 %
PDW BLD-RTO: 14.3 % (ref 11.7–14.9)
PLATELET # BLD: 303 K/CU MM (ref 140–440)
PMV BLD AUTO: 10.4 FL (ref 7.5–11.1)
POTASSIUM SERPL-SCNC: 4 MMOL/L (ref 3.5–5.1)
RBC # BLD: 2.91 M/CU MM (ref 4.2–5.4)
SEGMENTED NEUTROPHILS ABSOLUTE COUNT: 9.7 K/CU MM
SEGMENTED NEUTROPHILS RELATIVE PERCENT: 79.1 % (ref 36–66)
SODIUM BLD-SCNC: 138 MMOL/L (ref 135–145)
SPECIMEN: NORMAL
STATUS: NORMAL
STATUS: NORMAL
TOTAL IMMATURE NEUTOROPHIL: 0.22 K/CU MM
TOTAL NUCLEATED RBC: 0.1 K/CU MM
TRANSFUSION STATUS: NORMAL
TRANSFUSION STATUS: NORMAL
UNIT DIVISION: 0
UNIT DIVISION: 0
UNIT NUMBER: NORMAL
UNIT NUMBER: NORMAL
WBC # BLD: 12.3 K/CU MM (ref 4–10.5)

## 2021-10-25 PROCEDURE — 97530 THERAPEUTIC ACTIVITIES: CPT

## 2021-10-25 PROCEDURE — 6360000002 HC RX W HCPCS: Performed by: NURSE PRACTITIONER

## 2021-10-25 PROCEDURE — 97110 THERAPEUTIC EXERCISES: CPT

## 2021-10-25 PROCEDURE — 85025 COMPLETE CBC W/AUTO DIFF WBC: CPT

## 2021-10-25 PROCEDURE — 80048 BASIC METABOLIC PNL TOTAL CA: CPT

## 2021-10-25 PROCEDURE — C9113 INJ PANTOPRAZOLE SODIUM, VIA: HCPCS | Performed by: NURSE PRACTITIONER

## 2021-10-25 PROCEDURE — 6370000000 HC RX 637 (ALT 250 FOR IP): Performed by: STUDENT IN AN ORGANIZED HEALTH CARE EDUCATION/TRAINING PROGRAM

## 2021-10-25 PROCEDURE — 36415 COLL VENOUS BLD VENIPUNCTURE: CPT

## 2021-10-25 PROCEDURE — 6370000000 HC RX 637 (ALT 250 FOR IP): Performed by: INTERNAL MEDICINE

## 2021-10-25 PROCEDURE — 2580000003 HC RX 258: Performed by: STUDENT IN AN ORGANIZED HEALTH CARE EDUCATION/TRAINING PROGRAM

## 2021-10-25 PROCEDURE — 94761 N-INVAS EAR/PLS OXIMETRY MLT: CPT

## 2021-10-25 PROCEDURE — 97116 GAIT TRAINING THERAPY: CPT

## 2021-10-25 RX ORDER — POLYETHYLENE GLYCOL 3350 17 G/17G
17 POWDER, FOR SOLUTION ORAL DAILY PRN
Qty: 30 EACH | Refills: 0 | Status: SHIPPED | OUTPATIENT
Start: 2021-10-25 | End: 2021-11-24

## 2021-10-25 RX ORDER — FERROUS SULFATE 325(65) MG
325 TABLET ORAL
Qty: 90 TABLET | Refills: 0 | Status: SHIPPED | OUTPATIENT
Start: 2021-10-25 | End: 2022-08-31 | Stop reason: ALTCHOICE

## 2021-10-25 RX ADMIN — Medication 1 TABLET: at 09:31

## 2021-10-25 RX ADMIN — SENNOSIDES, DOCUSATE SODIUM 1 TABLET: 8.6; 5 TABLET ORAL at 09:31

## 2021-10-25 RX ADMIN — METOPROLOL SUCCINATE 50 MG: 50 TABLET, EXTENDED RELEASE ORAL at 09:30

## 2021-10-25 RX ADMIN — OXYCODONE HYDROCHLORIDE AND ACETAMINOPHEN 1 TABLET: 5; 325 TABLET ORAL at 09:30

## 2021-10-25 RX ADMIN — ONDANSETRON 4 MG: 4 TABLET, ORALLY DISINTEGRATING ORAL at 11:28

## 2021-10-25 RX ADMIN — APIXABAN 2.5 MG: 2.5 TABLET, FILM COATED ORAL at 09:31

## 2021-10-25 RX ADMIN — ATORVASTATIN CALCIUM 10 MG: 20 TABLET, FILM COATED ORAL at 09:30

## 2021-10-25 RX ADMIN — PANTOPRAZOLE SODIUM 40 MG: 40 INJECTION, POWDER, FOR SOLUTION INTRAVENOUS at 09:30

## 2021-10-25 RX ADMIN — SODIUM CHLORIDE, PRESERVATIVE FREE 10 ML: 5 INJECTION INTRAVENOUS at 09:30

## 2021-10-25 ASSESSMENT — PAIN - FUNCTIONAL ASSESSMENT: PAIN_FUNCTIONAL_ASSESSMENT: ACTIVITIES ARE NOT PREVENTED

## 2021-10-25 ASSESSMENT — PAIN DESCRIPTION - FREQUENCY: FREQUENCY: INTERMITTENT

## 2021-10-25 ASSESSMENT — PAIN SCALES - GENERAL
PAINLEVEL_OUTOF10: 4
PAINLEVEL_OUTOF10: 0
PAINLEVEL_OUTOF10: 0

## 2021-10-25 ASSESSMENT — PAIN DESCRIPTION - PAIN TYPE: TYPE: SURGICAL PAIN

## 2021-10-25 ASSESSMENT — PAIN DESCRIPTION - ORIENTATION: ORIENTATION: LEFT

## 2021-10-25 ASSESSMENT — PAIN DESCRIPTION - ONSET: ONSET: ON-GOING

## 2021-10-25 ASSESSMENT — PAIN DESCRIPTION - DESCRIPTORS: DESCRIPTORS: DISCOMFORT

## 2021-10-25 ASSESSMENT — PAIN DESCRIPTION - PROGRESSION: CLINICAL_PROGRESSION: GRADUALLY IMPROVING

## 2021-10-25 ASSESSMENT — PAIN DESCRIPTION - LOCATION: LOCATION: HIP

## 2021-10-25 NOTE — TELEPHONE ENCOUNTER
Jennifer Mccullough from [facility] called stating she has gotten a referral from [facility] patient is needing PT OT and nursing.  Needs a verbal orders to say you will follow

## 2021-10-25 NOTE — CARE COORDINATION
Chart reviewed and met w/ pt for continued discharge planning. CM met w/ pt for [facility]. Pt states she does not have a preference in [address] [place] [address]. Referral sent to [facility] liaison.

## 2021-10-25 NOTE — DISCHARGE SUMMARY
Discharge Summary    Date: 10/25/2021  Patient Name: Kelly Bobo YOB: 1940 Age: 80 y.o. Admit Date: 10/17/2021  Discharge Date: 10/25/2021  Discharge Condition: Good    Admission Diagnosis  Closed fracture of hip, unspecified laterality, initial encounter ([facility] [address] (S72.009A); Intertrochanteric fracture of left femur, closed, initial encounter ([facility] [address] (Y53.727Y)     Discharge Diagnosis  Active Problems: Intertrochanteric fracture of left femur, closed, initial encounter ([facility])Resolved Problems: * No resolved hospital problems. [facility] Stay  Narrative of Hospital Course:  63-year-old female presented for fall found to have a left intertrochanteric femur fracture.     Left Intertrochanteric Femoral Fracture  s/p ORIF 10/18  Doing well  Continue Eliquis  PT OT  Continue Nguyen  discharge home with home health care     Acute Anemia  Hgb 9.2   s/p one unit transfusion during this admission  no evidence of bleeding Eliquis is continued  Protonix daily  Given 1 dose of IV Venofer  Check CBC in 2 weeks     HTN  Continue Metoprolol     Paroxysmal Atrial Fibrillation  On Eliquis  Continue Metoprolol    Consultants:  IP CONSULT TO ORTHOPEDIC SURGERYIP CONSULT TO HOSPITALISTIP CONSULT TO CASE MANAGEMENTIP CONSULT TO GIIP CONSULT TO HOME CARE NEEDS    Surgeries/procedures Performed:       Treatments:           Discharge Plan/Disposition:  Home    Hospital/Incidental Findings Requiring Follow Up:    Patient Instructions:    Diet:    Activity:  For number of days (if applicable): Other Instructions:    Provider Follow-Up:   No follow-ups on file. Significant Diagnostic Studies:    Recent Labs:  Admission on 10/17/2021No results displayed because visit has over 200 results. ------------    Radiology last 7 days:  XR CHEST PORTABLEResult Date: 10/20/2021Left perihilar atelectasis. Right perihilar bronchial thickening suggestive of bronchitis.      Pending Labs Order Current Status Culture, Blood 1 Preliminary result     Discharge Medications    Current Discharge Medication ListSTART taking these medicationspolyethylene glycol (GLYCOLAX) 17 g packetTake 17 g by mouth daily as needed for ConstipationQty: 30 each Refills: 0ferrous sulfate (IRON 325) 325 (65 Fe) MG tabletTake 1 tablet by mouth daily (with breakfast)Qty: 90 tablet Refills: 0    Current Discharge Medication ListCONTINUE these medications which have CHANGEDapixaban (ELIQUIS) 5 MG TABS tabletTake 1 tablet by mouth 2 times dailyQty: 60 tablet Refills: 0    Current Discharge Medication ListCONTINUE these medications which have NOT CHANGEDmetoprolol succinate (TOPROL XL) 50 MG extended release tabletTake 1 tablet by mouth dailyQty: 90 tablet Refills: 1Associated Diagnoses:Essential hypertension; SVT (supraventricular tachycardia) ([address] Essential tremoratorvastatin (LIPITOR) 10 MG tabletTake 1 tablet by mouth dailyQty: 90 tablet Refills: 1Associated Diagnoses:Essential hypertensionfluticasone (FLONASE) 50 MCG/ACT nasal spray2 sprays by Each Nostril route dailyQty: 1 Bottle Refills: 1Associated Diagnoses:Nasal congestionAscorbic Acid (VITAMIN C) 500 MG tabletTake 1,000 mg by mouth dailycalcium-vitamin D (OSCAL-500) 500-200 MG-UNIT per tabletTake 1 tablet by mouth daily. Current Discharge Medication List    Time Spent on Discharge:E] minutes were spent in patient examination, evaluation, counseling as well as medication reconciliation, prescriptions for required medications, discharge plan, and follow up.     Electronically signed by Salo Morataya MD on 10/25/21 at 3:24 PM EDT

## 2021-10-25 NOTE — PLAN OF CARE
Problem: Falls - Risk of:  Goal: Will remain free from falls  Description: Will remain free from falls  Outcome: Completed  Goal: Absence of physical injury  Description: Absence of physical injury  Outcome: Completed     Problem: Pain:  Goal: Pain level will decrease  Description: Pain level will decrease  Outcome: Completed  Goal: Control of acute pain  Description: Control of acute pain  Outcome: Completed  Goal: Control of chronic pain  Description: Control of chronic pain  Outcome: Completed     Problem: Nutrition  Goal: Optimal nutrition therapy  Outcome: Completed     Problem: Mobility - Impaired:  Goal: Mobility will improve  Description: Mobility will improve  Outcome: Completed

## 2021-10-25 NOTE — PROGRESS NOTES
Physical Therapy    Physical Therapy Treatment Note  Name: Adam Sam MRN: [id number] :   1940   Date:  10/25/2021   Admission Date: 10/17/2021 Room:  [address] [place] [address]   Restrictions/Precautions:        General precautions; fall risk; WBAT on LLE  Communication with other providers:  Notified pt requesting pain meds. Subjective:  Patient states: Motivated and agreeable to tx.  General melanie) present and requesting pt return home. Discussed and reviewed home safety and getting pt from car to house. Pt and  report no steps to enter and bed bath on first floor. Pain:   Location, Type, Intensity (0/10 to 10/10):  Rates pain in left hip 4 at rest 6 with gt and ex  Objective:    Observation:  Alert and oriented  Treatment, including education/measures:  Sup to sit cga assist and cues to lift legs out of   Sit<=>stand from bed, chair, and commode mod assist and cues. Pt was able to do elkin care in sitting. Nurse removed catheter while pt on commode and pt was dependent for donning depends. amb with rw 100' x 1, 10' x 2 with min assist and cues. Pt has very slow step to gt pattern. Ex in sup and sitting with written copy:  Trunk stretches with deep breathing  20 reps aps and circles  15 reps quad sets  15 reps glut sets  10 reps heel slides RLR, 5 reps  on LLE with leg  assist  10 reps abd/add on RLE and was able to do 5 reps on LLE with leg  and min assist  10 reps x 2 laqs  Safety  Patient left safely in the chair , with call light/phone in reach with alarm applied. Gait belt and mask were used for transfers and gait  Assessment / Impression:       Patient's tolerance of treatment:  good  Adverse Reaction: na  Significant change in status and impact:  na  Barriers to improvement:  Strength and safety  Plan for Next Session:    Cont.  POC  Time in:  0840  Time out:  1000  Timed treatment minutes:  80  Total treatment time:  [de-identified]  Previously filed items:  Social/Functional History  Lives With: Spouse  Type of Home: House  Home Layout: Two level (Bi-level; 7+7 stairs with 1 HR)  Home Access: Level entry  Bathroom Shower/Tub: Tub/Shower unit  Bathroom Toilet: Standard  Bathroom Equipment: Toilet raiser  Bathroom Accessibility: Accessible  Home Equipment: Standard walker  ADL Assistance: [address]: Independent  Homemaking Responsibilities: Yes  Ambulation Assistance: Independent (uses no AD)  Transfer Assistance: Independent  Active : Yes  Occupation: Retired  Additional Comments: Pt reports falling from [employer] off my shoe\"  Short term goals  Time Frame for Short term goals: 1 week  Short term goal 1: Pt to complete all bed mobility SBA  Short term goal 2: Pt to complete all STS transfers to/from bed, commode, and chair CGA  Short term goal 3: Pt to ambulate 48' with LRAD CGA  Short term goal 4: Pt to ascend/descend 7 + 7 stairs with HR and CGA when appropriate to simulate home set up       Electronically signed by:    Andrew Goldberg PTA  10/25/2021, 8:19 AM

## 2021-10-25 NOTE — PROGRESS NOTES
[facility] Liaison spoke with pt and is aware of discharge & will initiate [facility].

## 2021-10-26 ENCOUNTER — CARE COORDINATION (OUTPATIENT)
Dept: CASE MANAGEMENT | Age: 81
End: 2021-10-26

## 2021-10-26 DIAGNOSIS — S72.142A INTERTROCHANTERIC FRACTURE OF LEFT FEMUR, CLOSED, INITIAL ENCOUNTER (HCC): Primary | ICD-10-CM

## 2021-10-26 PROCEDURE — 1111F DSCHRG MED/CURRENT MED MERGE: CPT | Performed by: FAMILY MEDICINE

## 2021-10-26 NOTE — CARE COORDINATION
Jose R [facility] Transitions Initial Follow Up Call    Call within 2 business days of discharge: Yes    Patient: Shayna Morrison Patient : 1940   MRN: [id number]  Reason for Admission: Left Intertrochanteric Femoral Fracture  Discharge Date: 10/25/21 RARS: Readmission Risk Score: 13.8      Last Discharge [facility]       Complaint Diagnosis Description Type Department Provider    10/17/21 Fall; Hip Pain Closed fracture of hip, unspecified laterality, initial encounter [facility]) ED to Hosp-Admission (Discharged) (ADMITTED) Abelardo Guzman MD; Soila Donahue. .. Transitions of Care Initial Call:    Patient states she is doing fine. No C/O pain. Up and ambulating with walker with no issues. Left hip dressing dry and intact. Reviewed medications with  Confirmed Patient obtained and is taking medications as directed. There are no questions concerning medications at this time. Medication education provided needed, questions answered, verbalizes understanding. Stressed importance of medication compliance. Advised contacting Pharmacy/MD for refill needs. Expresses understanding. 1111F Medication Reconciliation completed. Routed to PCP. Patient has fair appetite and is drinking adequate fluids. Normal bladder and bowel elimination patterns. Had BM today. Patient states will have [facility]  A few days. [address], spoke to [place]. She states they have referral and will schedule a visit in the next few days with patient. Offered patient assistance making PCP appointment,  patient declined. Patient states she will call PCP after this phone call and schedule appointment. Instructed patient that PCP will need to be seen within 7 days of discharge. Expresses understanding. Explained the Transition to Home program to patient and that they are followed for 30 days after discharge. Patient expresses understanding. Will continue to follow.      Jerry Bolton Adonay Adames, [facility]  Lydiala Handler / Jose R 45 Coordinator      Was this an external facility discharge? No Discharge Facility: [facility]    Challenges to be reviewed by the provider   Additional needs identified to be addressed with provider No  none             Method of communication with provider : none      Advance Care Planning:   Does patient have an Advance Directive:  not on file. Was this a readmission? No  Patient stated reason for admission: Left Intertrochanteric Femoral Fracture  Patients top risk factors for readmission: functional physical ability, lack of knowledge about disease, medical condition-Left Intertrochanteric Femoral Fracture and medication management    Care Transition Nurse (CTN) contacted the patient by telephone to perform post hospital discharge assessment. Verified name and  with patient as identifiers. Provided introduction to self, and explanation of the CTN role. CTN reviewed discharge instructions, medical action plan and red flags with patient who verbalized understanding. Patient given an opportunity to ask questions and does not have any further questions or concerns at this time. Were discharge instructions available to patient? Yes. Reviewed appropriate site of care based on symptoms and resources available to patient including: PCP, Specialist, Home health and When to call 911. The patient agrees to contact the PCP office for questions related to their healthcare. Medication reconciliation was performed with patient, who verbalizes understanding of administration of home medications. Advised obtaining a 90-day supply of all daily and as-needed medications. Covid Risk Education     Educated patient about risk for severe COVID-19 due to risk factors according to CDC guidelines. LPN CC reviewed discharge instructions, medical action plan and red flag symptoms with patient who verbalized understanding.      Discussed COVID vaccination status Yes. Education provided on COVID-19 vaccination as appropriate. Discussed exposure protocols and quarantine with CDC Guidelines. Patient was given an opportunity to verbalize any questions and concerns and agrees to contact LPN CC or health care provider for questions related to their healthcare. Reviewed and educated patient on any new and changed medications related to discharge diagnosis     Was patient discharged with a pulse oximeter? No     Discussed and confirmed pulse oximeter discharge instructions and when to notify provider or seek emergency care. LPN CC provided contact information. Plan for follow-up call in 5-7 days based on severity of symptoms and risk factors.        Non-face-to-face services provided:  Obtained and reviewed discharge summary and/or continuity of care documents    Care Transitions 24 Hour Call    Schedule Follow Up Appointment with PCP: Carlene Lefort you have any ongoing symptoms?: No  Do you have a copy of your discharge instructions?: Yes  Do you have all of your prescriptions and are they filled?: Yes  Have you been contacted by a [facility] Pharmacist?: No  Have you scheduled your follow up appointment?: No  Were you discharged with any Home Care or Post Acute Services: Yes  Post [facility]  Do you feel like you have everything you need to keep you well at home?: Yes  Care Transitions Interventions  No Identified Needs         Follow Up  Future Appointments   Date Time Provider Silvano Cole   12/1/2021 11:00 AM SCHEDULE, MARGA FRANCO   3/9/2022 11:30 AM MD MARGA Aquino LPN

## 2021-11-02 ENCOUNTER — CARE COORDINATION (OUTPATIENT)
Dept: CASE MANAGEMENT | Age: 81
End: 2021-11-02

## 2021-11-02 NOTE — CARE COORDINATION
about disease and medication management  Interventions to address risk factors: Obtained and reviewed discharge summary and/or continuity of care documents    Non-[facility] follow up appointment(s): 11/3/2021 Surgeon    CTN provided contact information for future needs. Plan for follow-up call in 7-10 days based on severity of symptoms and risk factors. Plan for next call: symptom management-Left Intertrochanteric Femoral Fracture     Care Transitions Subsequent and Final Call    Subsequent and Final Calls  Do you have any ongoing symptoms?: No  Have your medications changed?: No  Do you have any questions related to your medications?: No  Do you currently have any active services?: Yes  Are you currently active with any services?: Home Health  Do you have any needs or concerns that I can assist you with?: No  Identified Barriers: None  Care Transitions Interventions  Other Interventions:            Follow Up  Future Appointments   Date Time Provider Silvano Cole   11/3/2021 11:15 AM Brianda Harris DO [facility]   12/1/2021 11:00 AM SCHEDULE, MARGA FRANCO   3/9/2022 11:30 AM MD MARGA Cornell, LPN

## 2021-11-03 ENCOUNTER — OFFICE VISIT (OUTPATIENT)
Dept: ORTHOPEDIC SURGERY | Age: 81
End: 2021-11-03

## 2021-11-03 VITALS
HEIGHT: 57 IN | RESPIRATION RATE: 16 BRPM | HEART RATE: 78 BPM | OXYGEN SATURATION: 97 % | WEIGHT: 125 LBS | BODY MASS INDEX: 26.97 KG/M2

## 2021-11-03 DIAGNOSIS — Z09 POSTOP CHECK: Primary | ICD-10-CM

## 2021-11-03 PROCEDURE — 99024 POSTOP FOLLOW-UP VISIT: CPT | Performed by: STUDENT IN AN ORGANIZED HEALTH CARE EDUCATION/TRAINING PROGRAM

## 2021-11-03 NOTE — PROGRESS NOTES
Patient comes in today for a post op follow up of a left hip IM nail procedure completed on 10/18/21. She states that she is doing well. She rates her pain at 0/10 today but states that she has random sharp pains that go away. She is ambulating with a walker.    Occupation: retired

## 2021-11-05 ENCOUNTER — TELEPHONE (OUTPATIENT)
Dept: FAMILY MEDICINE CLINIC | Age: 81
End: 2021-11-05

## 2021-11-05 NOTE — TELEPHONE ENCOUNTER
----- Message from [facility] sent at 11/5/2021 10:41 AM EDT -----  Subject: Message to Provider    QUESTIONS  Information for Provider? Patient called to reschedule her flu shot she   has it scheduled for 12/1 but needs to get in sooner and would like to   come in next week. I was unable to reschedule this for her and was unable   to connect with the office do to technical difficulties. ---------------------------------------------------------------------------  --------------  Shan Drain INFO  What is the best way for the office to contact you? OK to leave message on   voicemail  Preferred Call Back Phone Number? [phone]  ---------------------------------------------------------------------------  --------------  SCRIPT ANSWERS  Relationship to Patient?  Self

## 2021-11-09 ENCOUNTER — CARE COORDINATION (OUTPATIENT)
Dept: CASE MANAGEMENT | Age: 81
End: 2021-11-09

## 2021-11-09 NOTE — CARE COORDINATION
Jose R [facility] Transitions Follow Up Call    2021    Patient: Yury Daniel  Patient : 1940   MRN: [id number]  Reason for Admission: Left Intertrochanteric Femoral Fracture  Discharge Date: 10/25/21 RARS: Readmission Risk Score: 13.8    Attempted to contact patient for Transition Subsequent call. Left HIPAA Compliant message on Voice Mail to call CTN (number given) with any questions and an update on patient's condition since discharge. Will continue to follow. Scott Flores LPN    [phone]  Tommy Arellano / Emeterio Nevarez 50 Transitions Subsequent and Final Call    Subsequent and Final Calls  Care Transitions Interventions  Other Interventions:            Follow Up  Future Appointments   Date Time Provider Silvano Cole   2021 11:30 AM MD MARGA Viveros [facility]   2021  1:00 PM Katelynn Moreland [facility]   3/9/2022 11:30 AM MD MARGA Viveros [facility]       Scott Flores LPN

## 2021-11-17 ENCOUNTER — OFFICE VISIT (OUTPATIENT)
Dept: FAMILY MEDICINE CLINIC | Age: 81
End: 2021-11-17
Payer: MEDICARE

## 2021-11-17 VITALS
HEART RATE: 74 BPM | SYSTOLIC BLOOD PRESSURE: 118 MMHG | WEIGHT: 121 LBS | BODY MASS INDEX: 26.1 KG/M2 | DIASTOLIC BLOOD PRESSURE: 70 MMHG | TEMPERATURE: 96.8 F | HEIGHT: 57 IN

## 2021-11-17 VITALS
DIASTOLIC BLOOD PRESSURE: 70 MMHG | BODY MASS INDEX: 25.4 KG/M2 | HEART RATE: 74 BPM | RESPIRATION RATE: 16 BRPM | OXYGEN SATURATION: 94 % | HEIGHT: 58 IN | SYSTOLIC BLOOD PRESSURE: 118 MMHG | WEIGHT: 121 LBS

## 2021-11-17 DIAGNOSIS — G89.29 CHRONIC LEFT-SIDED LOW BACK PAIN WITH LEFT-SIDED SCIATICA: ICD-10-CM

## 2021-11-17 DIAGNOSIS — M54.42 CHRONIC LEFT-SIDED LOW BACK PAIN WITH LEFT-SIDED SCIATICA: ICD-10-CM

## 2021-11-17 DIAGNOSIS — D64.9 ANEMIA, UNSPECIFIED TYPE: ICD-10-CM

## 2021-11-17 DIAGNOSIS — Z00.00 ROUTINE GENERAL MEDICAL EXAMINATION AT A HEALTH CARE FACILITY: Primary | ICD-10-CM

## 2021-11-17 DIAGNOSIS — Z09 HOSPITAL DISCHARGE FOLLOW-UP: Primary | ICD-10-CM

## 2021-11-17 DIAGNOSIS — Z91.81 AT HIGH RISK FOR FALLS: ICD-10-CM

## 2021-11-17 DIAGNOSIS — S72.145D NONDISPLACED INTERTROCHANTERIC FRACTURE OF LEFT FEMUR, SUBSEQUENT ENCOUNTER FOR CLOSED FRACTURE WITH ROUTINE HEALING: ICD-10-CM

## 2021-11-17 DIAGNOSIS — Z23 NEED FOR INFLUENZA VACCINATION: ICD-10-CM

## 2021-11-17 DIAGNOSIS — Z23 NEED FOR SHINGLES VACCINE: ICD-10-CM

## 2021-11-17 PROBLEM — R11.2 INTRACTABLE NAUSEA AND VOMITING: Status: RESOLVED | Noted: 2018-05-08 | Resolved: 2021-11-17

## 2021-11-17 PROBLEM — J18.9 SEPSIS DUE TO PNEUMONIA (HCC): Status: RESOLVED | Noted: 2018-05-08 | Resolved: 2021-11-17

## 2021-11-17 PROBLEM — A41.9 SEPSIS DUE TO PNEUMONIA (HCC): Status: RESOLVED | Noted: 2018-05-08 | Resolved: 2021-11-17

## 2021-11-17 PROBLEM — M81.0 AGE-RELATED OSTEOPOROSIS WITHOUT CURRENT PATHOLOGICAL FRACTURE: Status: RESOLVED | Noted: 2018-02-13 | Resolved: 2021-11-17

## 2021-11-17 LAB
BASOPHILS ABSOLUTE: 0 K/UL (ref 0–0.2)
BASOPHILS RELATIVE PERCENT: 0.6 %
EOSINOPHILS ABSOLUTE: 0.1 K/UL (ref 0–0.6)
EOSINOPHILS RELATIVE PERCENT: 2 %
FOLATE: 19.75 NG/ML (ref 4.78–24.2)
HCT VFR BLD CALC: 37.9 % (ref 36–48)
HEMOGLOBIN: 12.3 G/DL (ref 12–16)
LYMPHOCYTES ABSOLUTE: 1.4 K/UL (ref 1–5.1)
LYMPHOCYTES RELATIVE PERCENT: 26.1 %
MCH RBC QN AUTO: 31.7 PG (ref 26–34)
MCHC RBC AUTO-ENTMCNC: 32.4 G/DL (ref 31–36)
MCV RBC AUTO: 98.1 FL (ref 80–100)
MONOCYTES ABSOLUTE: 0.5 K/UL (ref 0–1.3)
MONOCYTES RELATIVE PERCENT: 8.5 %
NEUTROPHILS ABSOLUTE: 3.4 K/UL (ref 1.7–7.7)
NEUTROPHILS RELATIVE PERCENT: 62.8 %
PDW BLD-RTO: 15.9 % (ref 12.4–15.4)
PLATELET # BLD: 218 K/UL (ref 135–450)
PMV BLD AUTO: 9.1 FL (ref 5–10.5)
RBC # BLD: 3.87 M/UL (ref 4–5.2)
VITAMIN B-12: >2000 PG/ML (ref 211–911)
WBC # BLD: 5.5 K/UL (ref 4–11)

## 2021-11-17 PROCEDURE — G0008 ADMIN INFLUENZA VIRUS VAC: HCPCS | Performed by: FAMILY MEDICINE

## 2021-11-17 PROCEDURE — 36415 COLL VENOUS BLD VENIPUNCTURE: CPT | Performed by: FAMILY MEDICINE

## 2021-11-17 PROCEDURE — 1111F DSCHRG MED/CURRENT MED MERGE: CPT | Performed by: FAMILY MEDICINE

## 2021-11-17 PROCEDURE — 3288F FALL RISK ASSESSMENT DOCD: CPT | Performed by: FAMILY MEDICINE

## 2021-11-17 PROCEDURE — 99214 OFFICE O/P EST MOD 30 MIN: CPT | Performed by: FAMILY MEDICINE

## 2021-11-17 PROCEDURE — G0439 PPPS, SUBSEQ VISIT: HCPCS | Performed by: FAMILY MEDICINE

## 2021-11-17 PROCEDURE — 90694 VACC AIIV4 NO PRSRV 0.5ML IM: CPT | Performed by: FAMILY MEDICINE

## 2021-11-17 RX ORDER — ALENDRONATE SODIUM 70 MG/1
70 TABLET ORAL
Qty: 13 TABLET | Refills: 3 | Status: SHIPPED | OUTPATIENT
Start: 2021-11-17 | End: 2022-03-09 | Stop reason: SDUPTHER

## 2021-11-17 SDOH — ECONOMIC STABILITY: FOOD INSECURITY: WITHIN THE PAST 12 MONTHS, YOU WORRIED THAT YOUR FOOD WOULD RUN OUT BEFORE YOU GOT MONEY TO BUY MORE.: NEVER TRUE

## 2021-11-17 SDOH — ECONOMIC STABILITY: FOOD INSECURITY: WITHIN THE PAST 12 MONTHS, THE FOOD YOU BOUGHT JUST DIDN'T LAST AND YOU DIDN'T HAVE MONEY TO GET MORE.: NEVER TRUE

## 2021-11-17 ASSESSMENT — PATIENT HEALTH QUESTIONNAIRE - PHQ9
SUM OF ALL RESPONSES TO PHQ9 QUESTIONS 1 & 2: 0
1. LITTLE INTEREST OR PLEASURE IN DOING THINGS: 0
SUM OF ALL RESPONSES TO PHQ QUESTIONS 1-9: 0
2. FEELING DOWN, DEPRESSED OR HOPELESS: 0
SUM OF ALL RESPONSES TO PHQ QUESTIONS 1-9: 0
SUM OF ALL RESPONSES TO PHQ QUESTIONS 1-9: 0

## 2021-11-17 ASSESSMENT — LIFESTYLE VARIABLES: HOW OFTEN DO YOU HAVE A DRINK CONTAINING ALCOHOL: 0

## 2021-11-17 ASSESSMENT — SOCIAL DETERMINANTS OF HEALTH (SDOH): HOW HARD IS IT FOR YOU TO PAY FOR THE VERY BASICS LIKE FOOD, HOUSING, MEDICAL CARE, AND HEATING?: NOT HARD AT ALL

## 2021-11-17 NOTE — PATIENT INSTRUCTIONS
PLEASE BRING YOUR MEDICATIONS TO ALL APPOINTMENTS    The diagnoses and medications listed in this after visit summary may not be accurate at the time of check out. Please check MY CHART in 28-48 hours for possible corrections. Late cancellation policy: So that we can better accommodate people who are sick, please give our office 24 hour notice for an appointment cancellation. Thank you. Missed appointments: Your care is very important to us. It is important that you keep your scheduled appointments. Multiple missed appointments will lead to a dismissal from the office. Later arrival policy: If you are more than 10 minutes late for your appointment, you will be asked to re-schedule. Please allow 5-7 business days for paperwork to be processed. It is important that you check your MY Chart messages, as they include appointment reminders, test results, and other important information. If you have forgotten your password, please call [phone]. HERE ARE SOME LIFE CHANGING TIPS      1. Take your blood pressure medications at bedtime to reduce your chance of heart attack or stroke  2. Fever in kids:  Give both Tylenol and Ibuprofen at the same time rather than staggering them   3. Follow these tips to reduce childhood obesity: Reduce unnecessary exposure to antibiotics, consume whole milk instead of skim milk, watch public TV instead of regular TV (less exposure to junk food commercials), and reduce traumatic experiences. 4. 1 egg per day is good for your heart  5. Alternate day fasting does promote weight loss. Skipping breakfast increases your risk of obesity  6. Artificially sweetened drinks increase all cause mortality (strokes, body mass index, cardiovascular disease)  7. Kale consumption can reduce onset of dementia  8. Walking at least 8000 steps per day and resistance exercise 2-3 x per week are good for your heart  9.  Brushing teeth 3 times per day can decrease chance of

## 2021-11-17 NOTE — PATIENT INSTRUCTIONS
Personalized Preventive Plan for Paz Wu - 11/17/2021  Medicare offers a range of preventive health benefits. Some of the tests and screenings are paid in full while other may be subject to a deductible, co-insurance, and/or copay. Some of these benefits include a comprehensive review of your medical history including lifestyle, illnesses that may run in your family, and various assessments and screenings as appropriate. After reviewing your medical record and screening and assessments performed today your provider may have ordered immunizations, labs, imaging, and/or referrals for you. A list of these orders (if applicable) as well as your Preventive Care list are included within your After Visit Summary for your review. Other Preventive Recommendations:    · A preventive eye exam performed by an eye specialist is recommended every 1-2 years to screen for glaucoma; cataracts, macular degeneration, and other eye disorders. · A preventive dental visit is recommended every 6 months. · Try to get at least 150 minutes of exercise per week or 10,000 steps per day on a pedometer . · Order or download the FREE \"Exercise & Physical Activity: Your Everyday Guide\" from [employer]. Call 8-345.250.4672 or search [employer] online. · You need 2470-0708 mg of calcium and 8324-6443 IU of vitamin D per day. It is possible to meet your calcium requirement with diet alone, but a vitamin D supplement is usually necessary to meet this goal.  · When exposed to the sun, use a sunscreen that protects against both UVA and UVB radiation with an SPF of 30 or greater. Reapply every 2 to 3 hours or after sweating, drying off with a towel, or swimming. · Always wear a seat belt when traveling in a car. Always wear a helmet when riding a bicycle or motorcycle. Heart-Healthy Diet   Sodium, Fat, and Cholesterol Controlled Diet       What Is a Heart Healthy Diet?    A heart-healthy diet is one that limits sodium , certain types of fat , and cholesterol . This type of diet is recommended for:   People with any form of cardiovascular disease (eg, coronary heart disease , peripheral vascular disease , previous heart attack , previous stroke )   People with risk factors for cardiovascular disease, such as high blood pressure , high cholesterol , or diabetes   Anyone who wants to lower their risk of developing cardiovascular disease   Sodium    Sodium is a mineral found in many foods. In general, most people consume much more sodium than they need. Diets high in sodium can increase blood pressure and lead to edema (water retention). On a heart-healthy diet, you should consume no more than 2,300 mg (milligrams) of sodium per dayabout the amount in one teaspoon of table salt. The foods highest in sodium include table salt (about 50% sodium), processed foods, convenience foods, and preserved foods. Cholesterol    Cholesterol is a fat-like, waxy substance in your blood. Our bodies make some cholesterol. It is also found in animal products, with the highest amounts in fatty meat, egg yolks, whole milk, cheese, shellfish, and organ meats. On a heart-healthy diet, you should limit your cholesterol intake to less than 200 mg per day. It is normal and important to have some cholesterol in your bloodstream. But too much cholesterol can cause plaque to build up within your arteries, which can eventually lead to a heart attack or stroke. The two types of cholesterol that are most commonly referred to are:   Low-density lipoprotein (LDL) cholesterol  Also known as bad cholesterol, this is the cholesterol that tends to build up along your arteries. Bad cholesterol levels are increased by eating fats that are saturated or hydrogenated. Optimal level of this cholesterol is less than 100. Over 130 starts to get risky for heart disease.    High-density lipoprotein (HDL) cholesterol  Also known as good cholesterol, this type of cholesterol actually carries cholesterol away from your arteries and may, therefore, help lower your risk of having a heart attack. You want this level to be high (ideally greater than 60). It is a risk to have a level less than 40. You can raise this good cholesterol by eating olive oil, canola oil, avocados, or nuts. Exercise raises this level, too. Fat    Fat is calorie dense and packs a lot of calories into a small amount of food. Even though fats should be limited due to their high calorie content, not all fats are bad. In fact, some fats are quite healthful. Fat can be broken down into four main types. The good-for-you fats are:   Monounsaturated fat  found in oils such as olive and canola, avocados, and nuts and natural nut butters; can decrease cholesterol levels, while keeping levels of HDL cholesterol high   Polyunsaturated fat  found in oils such as safflower, sunflower, soybean, corn, and sesame; can decrease total cholesterol and LDL cholesterol   Omega-3 fatty acids  particularly those found in fatty fish (such as salmon, trout, tuna, mackerel, herring, and sardines); can decrease risk of arrhythmias, decrease triglyceride levels, and slightly lower blood pressure   The fats that you want to limit are:   Saturated fat  found in animal products, many fast foods, and a few vegetables; increases total blood cholesterol, including LDL levels   Animal fats that are saturated include: butter, lard, whole-milk dairy products, meat fat, and poultry skin   Vegetable fats that are saturated include: hydrogenated shortening, palm oil, coconut oil, cocoa butter   Hydrogenated or trans fat  found in margarine and vegetable shortening, most shelf stable snack foods, and fried foods; increases LDL and decreases HDL     It is generally recommended that you limit your total fat for the day to less than 30% of your total calories.  If you follow an 1800-calorie heart healthy diet, for week) Tofu Nuts or seeds (unsalted, dry-roasted), low-sodium peanut butter Dried peas, beans, and lentils   Any smoked, cured, salted, or canned meat, fish, or poultry (including dominguez, chipped beef, cold cuts, hot dogs, sausages, sardines, and anchovies) Poultry skins Breaded and/or fried fish or meats Canned peas, beans, and lentils Salted nuts   Fats and Oils   Olive oil and canola oil Low-sodium, low-fat salad dressings and mayonnaise   Butter, margarine, coconut and palm oils, dominguez fat   Snacks, Sweets, and Condiments   Low-sodium or unsalted versions of broths, soups, soy sauce, and condiments Pepper, herbs, and spices; vinegar, lemon, or lime juice Low-fat frozen desserts (yogurt, sherbet, fruit bars) Sugar, cocoa powder, honey, syrup, jam, and preserves Low-fat, trans-fat free cookies, cakes, and pies Christopher and animal crackers, fig bars, sahara snaps   High-fat desserts Broth, soups, gravies, and sauces, made from instant mixes or other high-sodium ingredients Salted snack foods Canned olives Meat tenderizers, seasoning salt, and most flavored vinegars   Beverages   Low-sodium carbonated beverages Tea and coffee in moderation Soy milk   Commercially softened water   Suggestions   Make whole grains, fruits, and vegetables the base of your diet. Choose heart-healthy fats such as canola, olive, and flaxseed oil, and foods high in heart-healthy fats, such as nuts, seeds, soybeans, tofu, and fish. Eat fish at least twice per week; the fish highest in omega-3 fatty acids and lowest in mercury include salmon, herring, mackerel, sardines, and canned chunk light tuna. If you eat fish less than twice per week or have high triglycerides, talk to your doctor about taking fish oil supplements. Read food labels.    For products low in fat and cholesterol, look for fat free, low-fat, cholesterol free, saturated fat free, and trans fat freeAlso scan the Nutrition Facts Label, which lists saturated fat, trans fat, and cholesterol amounts. For products low in sodium, look for sodium free, very low sodium, low sodium, no added salt, and unsalted   Skip the salt when cooking or at the table; if food needs more flavor, get creative and try out different herbs and spices. Garlic and onion also add substantial flavor to foods. Trim any visible fat off meat and poultry before cooking, and drain the fat off after guido. Use cooking methods that require little or no added fat, such as grilling, boiling, baking, poaching, broiling, roasting, steaming, stir-frying, and sauting. Avoid fast food and convenience food. They tend to be high in saturated and trans fat and have a lot of added salt. Talk to a registered dietitian for individualized diet advice. Last Reviewed: March 2011 Ramesh Krishna MS, MPH, RD   Updated: 3/29/2011   ·     Heart-Healthy Diet   Sodium, Fat, and Cholesterol Controlled Diet       What Is a Heart Healthy Diet? A heart-healthy diet is one that limits sodium , certain types of fat , and cholesterol . This type of diet is recommended for:   People with any form of cardiovascular disease (eg, coronary heart disease , peripheral vascular disease , previous heart attack , previous stroke )   People with risk factors for cardiovascular disease, such as high blood pressure , high cholesterol , or diabetes   Anyone who wants to lower their risk of developing cardiovascular disease   Sodium    Sodium is a mineral found in many foods. In general, most people consume much more sodium than they need. Diets high in sodium can increase blood pressure and lead to edema (water retention). On a heart-healthy diet, you should consume no more than 2,300 mg (milligrams) of sodium per dayabout the amount in one teaspoon of table salt. The foods highest in sodium include table salt (about 50% sodium), processed foods, convenience foods, and preserved foods.    Cholesterol    Cholesterol is a fat-like, waxy substance in your blood. Our bodies make some cholesterol. It is also found in animal products, with the highest amounts in fatty meat, egg yolks, whole milk, cheese, shellfish, and organ meats. On a heart-healthy diet, you should limit your cholesterol intake to less than 200 mg per day. It is normal and important to have some cholesterol in your bloodstream. But too much cholesterol can cause plaque to build up within your arteries, which can eventually lead to a heart attack or stroke. The two types of cholesterol that are most commonly referred to are:   Low-density lipoprotein (LDL) cholesterol  Also known as bad cholesterol, this is the cholesterol that tends to build up along your arteries. Bad cholesterol levels are increased by eating fats that are saturated or hydrogenated. Optimal level of this cholesterol is less than 100. Over 130 starts to get risky for heart disease. High-density lipoprotein (HDL) cholesterol  Also known as good cholesterol, this type of cholesterol actually carries cholesterol away from your arteries and may, therefore, help lower your risk of having a heart attack. You want this level to be high (ideally greater than 60). It is a risk to have a level less than 40. You can raise this good cholesterol by eating olive oil, canola oil, avocados, or nuts. Exercise raises this level, too. Fat    Fat is calorie dense and packs a lot of calories into a small amount of food. Even though fats should be limited due to their high calorie content, not all fats are bad. In fact, some fats are quite healthful. Fat can be broken down into four main types.    The good-for-you fats are:   Monounsaturated fat  found in oils such as olive and canola, avocados, and nuts and natural nut butters; can decrease cholesterol levels, while keeping levels of HDL cholesterol high   Polyunsaturated fat  found in oils such as safflower, sunflower, soybean, corn, and sesame; can decrease total cholesterol and LDL cholesterol   Omega-3 fatty acids  particularly those found in fatty fish (such as salmon, trout, tuna, mackerel, herring, and sardines); can decrease risk of arrhythmias, decrease triglyceride levels, and slightly lower blood pressure   The fats that you want to limit are:   Saturated fat  found in animal products, many fast foods, and a few vegetables; increases total blood cholesterol, including LDL levels   Animal fats that are saturated include: butter, lard, whole-milk dairy products, meat fat, and poultry skin   Vegetable fats that are saturated include: hydrogenated shortening, palm oil, coconut oil, cocoa butter   Hydrogenated or trans fat  found in margarine and vegetable shortening, most shelf stable snack foods, and fried foods; increases LDL and decreases HDL     It is generally recommended that you limit your total fat for the day to less than 30% of your total calories. If you follow an 1800-calorie heart healthy diet, for example, this would mean 60 grams of fat or less per day. Saturated fat and trans fat in your diet raises your blood cholesterol the most, much more than dietary cholesterol does. For this reason, on a heart-healthy diet, less than 7% of your calories should come from saturated fat and ideally 0% from trans fat. On an 1800-calorie diet, this translates into less than 14 grams of saturated fat per day, leaving 46 grams of fat to come from mono- and polyunsaturated fats.    Food Choices on a Heart Healthy Diet   Food Category   Foods Recommended   Foods to Avoid   Grains   Breads and rolls without salted tops Most dry and cooked cereals Unsalted crackers and breadsticks Low-sodium or homemade breadcrumbs or stuffing All rice and pastas   Breads, rolls, and crackers with salted tops High-fat baked goods (eg, muffins, donuts, pastries) Quick breads, self-rising flour, and biscuit mixes Regular bread crumbs Instant hot cereals Commercially prepared rice, pasta, or stuffing mixes Vegetables   Most fresh, frozen, and low-sodium canned vegetables Low-sodium and salt-free vegetable juices Canned vegetables if unsalted or rinsed   Regular canned vegetables and juices, including sauerkraut and pickled vegetables Frozen vegetables with sauces Commercially prepared potato and vegetable mixes   Fruits   Most fresh, frozen, and canned fruits All fruit juices   Fruits processed with salt or sodium   Milk   Nonfat or low-fat (1%) milk Nonfat or low-fat yogurt Cottage cheese, low-fat ricotta, cheeses labeled as low-fat and low-sodium   Whole milk Reduced-fat (2%) milk Malted and chocolate milk Full fat yogurt Most cheeses (unless low-fat and low salt) Buttermilk (no more than 1 cup per week)   Meats and Beans   Lean cuts of fresh or frozen beef, veal, lamb, or pork (look for the word loin) Fresh or frozen poultry without the skin Fresh or frozen fish and some shellfish Egg whites and egg substitutes (Limit whole eggs to three per week) Tofu Nuts or seeds (unsalted, dry-roasted), low-sodium peanut butter Dried peas, beans, and lentils   Any smoked, cured, salted, or canned meat, fish, or poultry (including dominguez, chipped beef, cold cuts, hot dogs, sausages, sardines, and anchovies) Poultry skins Breaded and/or fried fish or meats Canned peas, beans, and lentils Salted nuts   Fats and Oils   Olive oil and canola oil Low-sodium, low-fat salad dressings and mayonnaise   Butter, margarine, coconut and palm oils, dominguez fat   Snacks, Sweets, and Condiments   Low-sodium or unsalted versions of broths, soups, soy sauce, and condiments Pepper, herbs, and spices; vinegar, lemon, or lime juice Low-fat frozen desserts (yogurt, sherbet, fruit bars) Sugar, cocoa powder, honey, syrup, jam, and preserves Low-fat, trans-fat free cookies, cakes, and pies Christopher and animal crackers, fig bars, sahara snaps   High-fat desserts Broth, soups, gravies, and sauces, made from instant mixes or other high-sodium ingredients Salted snack foods Canned olives Meat tenderizers, seasoning salt, and most flavored vinegars   Beverages   Low-sodium carbonated beverages Tea and coffee in moderation Soy milk   Commercially softened water   Suggestions   Make whole grains, fruits, and vegetables the base of your diet. Choose heart-healthy fats such as canola, olive, and flaxseed oil, and foods high in heart-healthy fats, such as nuts, seeds, soybeans, tofu, and fish. Eat fish at least twice per week; the fish highest in omega-3 fatty acids and lowest in mercury include salmon, herring, mackerel, sardines, and canned chunk light tuna. If you eat fish less than twice per week or have high triglycerides, talk to your doctor about taking fish oil supplements. Read food labels. For products low in fat and cholesterol, look for fat free, low-fat, cholesterol free, saturated fat free, and trans fat freeAlso scan the Nutrition Facts Label, which lists saturated fat, trans fat, and cholesterol amounts. For products low in sodium, look for sodium free, very low sodium, low sodium, no added salt, and unsalted   Skip the salt when cooking or at the table; if food needs more flavor, get creative and try out different herbs and spices. Garlic and onion also add substantial flavor to foods. Trim any visible fat off meat and poultry before cooking, and drain the fat off after guido. Use cooking methods that require little or no added fat, such as grilling, boiling, baking, poaching, broiling, roasting, steaming, stir-frying, and sauting. Avoid fast food and convenience food. They tend to be high in saturated and trans fat and have a lot of added salt. Talk to a registered dietitian for individualized diet advice. Last Reviewed: March 2011 Sangita Santillan MS, MPH, RD   Updated: 3/29/2011   ·     High-Fiber Diet     What Is Fiber? Dietary fiber is a form of carbohydrate found in plants that cannot be digested by humans.  All plants contain fiber, including fruits, vegetables, grains, and legumes. Fiber is often classified into two categories: soluble and insoluble. Soluble fiber draws water into the bowel and can help slow digestion. Examples of foods that are high in soluble fiber include oatmeal, oat bran, barley, legumes (eg, beans and peas), apples, and strawberries. Insoluble fiber speeds digestion and can add bulk to the stool. Examples of foods that are high in insoluble fiber include whole-wheat products, wheat bran, cauliflower, green beans, and potatoes. Why Follow a High-Fiber Diet? A high-fiber diet is often recommended to prevent and treat constipation , hemorrhoids , diverticulitis , and irritable bowel syndrome . Eating a high-fiber diet can also help improve your cholesterol levels, lower your risk of coronary heart disease , reduce your risk of type 2 diabetes , and lower your weight. For people with type 1 or 2 diabetes, a high-fiber diet can also help stabilize blood sugar levels. How Much Fiber Should I Eat? A high-fiber diet should contain  20-35 grams  of fiber a day. This is actually the amount recommended for the general adult population; however, most Americans eat only 15 grams of fiber per day. Digestion of Fiber   Eating a higher fiber diet than usual can take some getting used to by your body's digestive system. To avoid the side effects of sudden increases in dietary fiber (eg, gas, cramping, bloating, and diarrhea), increase fiber gradually and be sure to drink plenty of fluids every day. Tips for Increasing Fiber Intake   Whenever possible, choose whole grains over refined grains (eg, brown rice instead of white rice, whole-wheat bread instead of white bread). Include a variety of grains in your diet, such as wheat, rye, barley, oats, quinoa, and bulgur. Eat more vegetarian-based meals. Here are some ideas: black bean burgers, eggplant lasagna, and veggie tofu stir-fernandes.     Choose high-fiber snacks, such as fruits, popcorn, whole-grain crackers, and nuts. Make whole-grain cereal or whole-grain toast part of your daily breakfast regime. When eating out, whether ordering a sandwich or dinner, ask for extra vegetables. When baking, replace part of the white flour with whole-wheat flour. Whole-wheat flour is particularly easy to incorporate into a recipe. High-Fiber Diet Eating Guide   Food Category   Foods Recommended   Notes   Grains   Whole-grain breads, muffins, bagels, or andre bread Rye bread Whole-wheat crackers or crisp breads Whole-grain or bran cereals Oatmeal, oat bran, or grits Wheat germ Whole-wheat pasta and brown rice   Read the ingredients list on food labels. Look for products that list \"whole\" as the first ingredient (eg, whole-wheat, whole oats). Choose cereals with at least 2 grams of fiber per serving. Vegetables   All vegetables, especially asparagus, bean sprouts, broccoli, [place] sprouts, cabbage, carrots, cauliflower, celery, corn, greens, green beans, green pepper, onions, peas, potatoes (with skin), snow peas, spinach, squash, sweet potatoes, tomatoes, zucchini   For maximum fiber intake, eat the peels of fruits and vegetablesjust be sure to wash them well first.   Fruits   All fruits, especially apples, berries, grapefruits, mangoes, nectarines, oranges, peaches, pears, dried fruits (figs, dates, prunes, raisins)   Choose raw fruits and vegetables over juice, cooked, or cannedraw fruit has more fiber. Dried fruit is also a good source of fiber. Milk   With the exception of yogurt containing inulin (a type of fiber), dairy foods provide little fiber. Add more fiber by topping your yogurt or cottage cheese with fresh fruit, whole grain or bran cereals, nuts, or seeds.    Meats and Beans   All beans and peas, especially Garbanzo beans, kidney beans, lentils, lima beans, split peas, and vera beans All nuts and seeds, especially almonds, peanuts, [place] nuts, cashews, peanut butter, walnuts, sesame and sunflower seeds All meat, poultry, fish, and eggs   Increase fiber in meat dishes by adding vera beans, kidney beans, black-eyed peas, bran, or oatmeal. If you are following a low-fat diet, use nuts and seeds only in moderation. Fats and Oils   All in moderation   Fats and oils do not provide fiber   Snacks, Sweets, and Condiments   Fruit Nuts Popcorn, whole-wheat pretzels, or trail mix made with dried fruits, nuts, and seeds Cakes, breads, and cookies made with oatmeal or whole-wheat flour   Most snack foods do not provide much fiber. Choose snacks with at least 2 grams of fiber per serving. Last Reviewed: March 2011 Sangita Santillan MS, MPH, RD   Updated: 3/29/2011   ·     Keep Your Memory Elfrieda Min       Many factors can affect your ability to remembera hectic lifestyle, aging, stress, chronic disease, and certain medicines. But, there are steps you can take to sharpen your mind and help preserve your memory. Challenge Your Brain   Regularly challenging your mind may help keeps it in top shape. Good mental exercises include:   Crossword puzzlesUse a dictionary if you need it; you will learn more that way. Brainteasers Try some! Crafts, such as wood working and sewing   Hobbies, such as gardening and building model airplanes   SocializingVisit old friends or join groups to meet new ones. Reading   Learning a new language   Taking a class, whether it be art history or robert chi   TravelingExperience the food, history, and culture of your destination   Learning to use a computer   Going to museums, the theater, or thought-provoking movies   Changing things in your daily life, such as reversing your pattern in the grocery store or brushing your teeth using your nondominant hand   Use Memory Aids   There is no need to remember every detail on your own.  These memory aids can help:   Calendars and day planners   Electronic organizers to store all sorts of helpful informationThese devices can \"beep\" to remind you of appointments. A book of days to record birthdays, anniversaries, and other occasions that occur on the same date every year   Detailed \"to-do\" lists and strategically placed sticky notes   Quick \"study\" sessionsBefore a gathering, review who will be there so their names will be fresh in your mind. Establish routinesFor example, keep your keys, wallet, and umbrella in the same place all the time or take medicine with your 8:00 AM glass of juice   Live a Healthy Life   Many actions that will keep your body strong will do the same for your mind. For example:   Talk to Your Doctor About Herbs and Supplements    Malnutrition and vitamin deficiencies can impair your mental function. For example, vitamin B12 deficiency can cause a range of symptoms, including confusion. But, what if your nutritional needs are being met? Can herbs and supplements still offer a benefit? Researchers have investigated a range of natural remedies, such as ginkgo , ginseng , and the supplement phosphatidylserine (PS). So far, though, the evidence is inconsistent as to whether these products can improve memory or thinking. If you are interested in taking herbs and supplements, talk to your doctor first because they may interact with other medicines that you are taking. Exercise Regularly    Among the many benefits of regular exercise are increased blood flow to the brain and decreased risk of certain diseases that can interfere with memory function. One study found that even moderate exercise has a beneficial effect. Examples of \"moderate\" exercise include:   Playing 18 holes of golf once a week, without a cart   Playing tennis twice a week   Walking one mile per day   Manage Stress    It can be tough to remember what is important when your mind is cluttered. Make time for relaxation. Choose activities that calm you down, and make it routine.    Manage Chronic Conditions    Side effects of high blood pressure , diabetes, and heart disease can interfere with mental function. Many of the lifestyle steps discussed here can help manage these conditions. Strive to eat a healthy diet, exercise regularly, get stress under control, and follow your doctor's advice for your condition. Minimize Medications    Talk to your doctor about the medicines that you take. Some may be unnecessary. Also, healthy lifestyle habits may lower the need for certain drugs. Last Reviewed: April 2010 Ronald Mike MD   Updated: 4/13/2010   ·     [address]       As we get older, changes in balance, gait, strength, vision, hearing, and cognition make even the most youthful senior more prone to accidents. Falls are one of the leading health risks for older people. This increased risk of falling is related to:   Aging process (eg, decreased muscle strength, slowed reflexes)   Higher incidence of chronic health problems (eg, arthritis, diabetes) that may limit mobility, agility or sensory awareness   Side effects of medicine (eg, dizziness, blurred vision)especially medicines like prescription pain medicines and drugs used to treat mental health conditions   Depending on the brittleness of your bones, the consequences of a fall can be serious and long lasting. Home Life   Research by the Association of Aging [employer]) shows that some home accidents among older adults can be prevented by making simple lifestyle changes and basic modifications and repairs to the home environment. Here are some lifestyle changes that experts recommend:   Have your hearing and vision checked regularly. Be sure to wear prescription glasses that are right for you. Speak to your doctor or pharmacist about the possible side effects of your medicines. A number of medicines can cause dizziness. If you have problems with sleep, talk to your doctor. Limit your intake of alcohol.    If necessary, use a cane or walker to help maintain your balance. Wear supportive, rubber-soled shoes, even at home. If you live in a region that gets wintry weather, you may want to put special cleats on your shoes to prevent you from slipping on the snow and ice. Exercise regularly to help maintain muscle tone, agility, and balance. Always hold the banister when going up or down stairs. Also, use  bars when getting in or out of the bath or shower, or using the toilet. To avoid dizziness, get up slowly from a lying down position. Sit up first, dangling your legs for a minute or two before rising to a standing position. Overall Home Safety Check   According to the Consumer Product Safety Commision's \"Older Consumer Home Safety Checklist,\" it is important to check for potential hazards in each room. And remember, proper lighting is an essential factor in home safety. If you cannot see clearly, you are more likely to fall. Important questions to ask yourself include:   Are lamp, electric, extension, and telephone cords placed out of the flow of traffic and maintained in good condition? Have frayed cords been replaced? Are all small rugs and runners slip resistant? If not, you can secure them to the floor with a special double-sided carpet tape. Are smoke detectors properly locatedone on every floor of your home and one outside of every sleeping area? Are they in good working order? Are batteries replaced at least once a year? Do you have a well-maintained carbon monoxide detector outside every sleeping are in your home? Does your furniture layout leave plenty of space to maneuver between and around chairs, tables, beds, and sofas? Are hallways, stairs and passages between rooms well lit? Can you reach a lamp without getting out of bed? Are floor surfaces well maintained? Shag rugs, high-pile carpeting, tile floors, and polished wood floors can be particularly slippery.  Stairs should always have handrails and be carpeted or fitted with a non-skid tread. Is your telephone easily reachable. Is the cord safely tucked away? Room by Room   According to the Association of Aging, bathrooms and eva are the two most potentially hazardous rooms in your home. In the Kitchen    Be sure your stove is in proper working order and always make sure burners and the oven are off before you go out or go to sleep. Keep pots on the back burners, turn handles away from the front of the stove, and keep stove clean and free of grease build-up. Kitchen ventilation systems and range exhausts should be working properly. Keep flammable objects such as towels and pot holders away from the cooking area except when in use. Make sure kitchen curtains are tied back. Move cords and appliances away from the sink and hot surfaces. If extension cords are needed, install wiring guides so they do not hang over the sink, range, or working areas. Look for coffee pots, kettles and toaster ovens with automatic shut-offs. Keep a mop handy in the kitchen so you can wipe up spills instantly. You should also have a small fire extinguisher. Arrange your kitchen with frequently used items on lower shelves to avoid the need to stand on a stepstool to reach them. Make sure countertops are well-lit to avoid injuries while cutting and preparing food. In the Bathroom    Use a non-slip mat or decals in the tub and shower, since wet, soapy tile or porcelain surfaces are extremely slippery. Make sure bathroom rugs are non-skid or tape them firmly to the floor. Bathtubs should have at least one, preferably two, grab bars, firmly attached to structural supports in the wall. (Do not use built-in soap holders or glass shower doors as grab bars.)    Tub seats fitted with non-slip material on the legs allow you to wash sitting down. For people with limited mobility, bathtub transfer benches allow you to slide safely into the tub.     Raised toilet seats and toilet safety rails are helpful for those with knee or hip problems. In the [facility]    Make sure you use a nightlight and that the area around your bed is clear of potential obstacles. Be careful with electric blankets and never go to sleep with a heating pad, which can cause serious burns even if on a low setting. Use fire-resistant mattress covers and pillows, and NEVER smoke in bed. Keep a phone next to the bed that is programmed to dial 911 at the push of a button. If you have a chronic condition, you may want to sign on with an automatic call-in service. Typically the system includes a small pendant that connects directly to an emergency medical voice-response system. You should also make arrangements to stay in contact with someonefriend, neighbor, family memberon a regular schedule. Fire Prevention   According to the [employer] (Smoke Alarms for Every) [address] [place] [address], senior citizens are one of the two highest risk groups for death and serious injuries due to residential fires. When cooking, wear short-sleeved items, never a bulky long-sleeved robe. The [facility]'s Safety Checklist for Older Consumers emphasizes the importance of checking basements, garages, workshops and storage areas for fire hazards, such as volatile liquids, piles of old rags or clothing and overloaded circuits. Never smoke in bed or when lying down on a couch or recliner chair. Small portable electric or kerosene heaters are responsible for many home fires and should be used cautiously if at all. If you do use one, be sure to keep them away from flammable materials. In case of fire, make sure you have a pre-established emergency exit plan. Have a professional check your fireplace and other fuel-burning appliances yearly. Hel[employer]   Baby boomers entering the maurer years will continue to see the development of new products to help older adults live safely and independently in spite of age-related changes. Making Life More Livable  , by Renae Sullivan, lists over 1,000 products for \"living well in the mature years,\" such as bathing and mobility aids, household security devices, ergonomically designed knives and peelers, and faucet valves and knobs for temperature control. Medical supply stores and organizations are good sources of information about products that improve your quality of life and insure your safety.      Last Reviewed: November 2009 Saloni Angulo MD   Updated: 3/7/2011     ·

## 2021-11-17 NOTE — PROGRESS NOTES
On the basis of positive falls risk screening, assessment and plan is as follows: has already been going to PT. Post-Discharge Transitional Care Management Services or Hospital Follow Up      Chalino Lerma   YOB: 1940    Date of Office Visit:  11/17/2021  Date of Hospital Admission: 10/17/21  Date of Hospital Discharge: 10/25/21  Risk of hospital readmission (high >=14%.  Medium >=10%) :Readmission Risk Score: 13.8      Care management risk score Rising risk (score 2-5) and Complex Care (Scores >=6): 1     Non face to face  following discharge, date last encounter closed (first attempt may have been earlier): *No documented post hospital discharge outreach found in the last 14 days    Call initiated 2 business days of discharge: *No response recorded in the last 14 days    Patient Active Problem List   Diagnosis    Essential hypertension    Osteoporosis    Hx of colonic polyps    Degenerative disc disease, lumbar    Spondylolisthesis of lumbar region    Osteoarthritis of cervical spine    SVT (supraventricular tachycardia) ([address]    Chronic left-sided low back pain with left-sided sciatica    Nonrheumatic aortic valve insufficiency    Mitral regurgitation    History of compression fracture of spine    Anticoagulated    Paroxysmal atrial fibrillation ([address]    Nondisplaced intertrochanteric fracture of left femur, subsequent encounter for closed fracture with routine healing       No Known Allergies    Medications listed as ordered at the time of discharge from hospital  @DISCHARGEMEDSLIST(<NOROUTINE> error)@      Medications marked \"taking\" at this time  Outpatient Medications Marked as Taking for the 11/17/21 encounter (Office Visit) with Fermín Becker MD   Medication Sig Dispense Refill    alendronate (FOSAMAX) 70 MG tablet Take 1 tablet by mouth every 7 days 13 tablet 3    apixaban (ELIQUIS) 5 MG TABS tablet Take 1 tablet by mouth 2 times daily (Patient taking differently: Take 5 mg by mouth daily ) 60 tablet 0    atorvastatin (LIPITOR) 10 MG tablet Take 1 tablet by mouth daily 90 tablet 1    metoprolol succinate (TOPROL XL) 50 MG extended release tablet Take 1 tablet by mouth daily 90 tablet 1    Ascorbic Acid (VITAMIN C) 500 MG tablet Take 1,000 mg by mouth daily      calcium-vitamin D (OSCAL-500) 500-200 MG-UNIT per tablet Take 1 tablet by mouth daily. Medications patient taking as of now reconciled against medications ordered at time of hospital discharge: Yes    Chief Complaint   Patient presents with    Follow-Up from Hospital     fell 10/17/21 broke left hip       History of Present illness - Follow up of Hospital diagnosis(es): Left hip fracture. Anemia. Received blood transfusion    Inpatient course: Discharge summary reviewed- see chart. Interval history/Current status: doing very well and almost done with PT    A comprehensive review of systems was negative except for what was noted in the HPI. Vitals:    11/17/21 1120   BP: 118/70   Site: Left Upper Arm   Position: Sitting   Cuff Size: Medium Adult   Pulse: 74   Temp: 96.8 °F (36 °C)   TempSrc: Infrared   Weight: 121 lb (54.9 kg)   Height: 4' 9\" (1.448 m)     Body mass index is 26.18 kg/m².    Wt Readings from Last 3 Encounters:   11/17/21 121 lb (54.9 kg)   11/17/21 121 lb (54.9 kg)   11/03/21 125 lb (56.7 kg)     BP Readings from Last 3 Encounters:   11/17/21 118/70   11/17/21 118/70   10/25/21 (!) 140/68        Physical Exam:  General Appearance: alert and oriented to person, place and time, well developed and well- nourished, in no acute distress  Skin: warm and dry, no rash or erythema  Head: normocephalic and atraumatic  Eyes: pupils equal, round, and reactive to light, extraocular eye movements intact, conjunctivae normal  ENT: tympanic membrane, external ear and ear canal normal bilaterally, nose without deformity, nasal mucosa and turbinates normal without polyps  Neck: supple and non-tender without mass, no thyromegaly or thyroid nodules, no cervical lymphadenopathy  Pulmonary/Chest: clear to auscultation bilaterally- no wheezes, rales or rhonchi, normal air movement, no respiratory distress  Cardiovascular: normal rate, regular rhythm, normal S1 and S2, no murmurs, rubs, clicks, or gallops, distal pulses intact, no carotid bruits  Abdomen: soft, non-tender, non-distended, normal bowel sounds, no masses or organomegaly  Extremities: no cyanosis, clubbing or edema  Musculoskeletal: normal range of motion, no joint swelling, deformity or tenderness  Neurologic:, no cranial nerve deficit, antalgic gait with cane, coordination and speech normal     Diagnosis Orders   1. Hospital discharge follow-up  HI DISCHARGE MEDS RECONCILED W/ CURRENT OUTPATIENT MED LIST   2. Essential hypertension     3. Chronic left-sided low back pain with left-sided sciatica     4. Need for influenza vaccination  INFLUENZA, QUADV, ADJUVANTED, 65 YRS =, IM, PF, PREFILL SYR, 0.5ML (FLUAD)   5. Nondisplaced intertrochanteric fracture of left femur, subsequent encounter for closed fracture with routine healing  alendronate (FOSAMAX) 70 MG tablet   6. Need for shingles vaccine     7. Anemia, unspecified type  CBC WITH AUTO DIFFERENTIAL    VITAMIN B12 & FOLATE   8. At high risk for falls       Review of chart indicates that patient was anemic. We will recheck her blood count today    Reminded her to get her shingles vaccine        Because of her fracture and osteoporosis, will restart the Fosamax.     Medical Decision Making: high complexity

## 2021-11-17 NOTE — PROGRESS NOTES
2015    Degenerative disc disease, lumbar 2013    Environmental allergies     Hx of colonic polyps 2006    Hypertension     Osteoarthritis of lumbar spine     Osteoporosis     PONV (postoperative nausea and vomiting)     \"just with the hernia surgery\"    Spondylolisthesis of lumbar region 2013    Surgical menopause     TIA (transient ischemic attack) 2006    Unspecified cerebral artery occlusion with cerebral infarction     \"had light stroke 6 yrs ago-everything went black and thought I was gone but only lasted about 10 minuts\" no residual now       Past Surgical History:   Procedure Laterality Date    CHOLECYSTECTOMY  age 29's    open    COLONOSCOPY  2010    DILATION AND CURETTAGE OF UTERUS  age29's    FEMUR FRACTURE SURGERY Left 10/18/2021    FEMUR IM NAIL PB INSERTION performed by Vane Reis DO at [facility] Right 2014   [address]      right ing hernia    HYSTERECTOMY  age 29's    \"took both ovaries\"    TONSILLECTOMY  age 22    VERTEBROPLASTY  8/24/15       Family History   Problem Relation Age of Onset    Heart Attack Mother         Myocardial Infarction- 76    Heart Disease Mother         MI   [facility] Diabetes Mother     High Blood Pressure Mother     Diabetes Sister         Type 2    Other Brother         AAA    Heart Disease Brother         MI    Heart Disease Father         MI    Diabetes Sister     Heart Disease Brother         MI       CareTeam (Including outside providers/suppliers regularly involved in providing care):   Patient Care Team:  Mireille Goldberg MD as PCP - Venita Marquez MD as PCP - [facility] Empaneled Provider  Chiki Longo MD as Consulting Physician (Orthopedic Surgery)  Duncan Reyes LPN as Care Transitions Nurse    Wt Readings from Last 3 Encounters:   21 121 lb (54.9 kg)   21 125 lb (56.7 kg)   10/25/21 137 lb 5.6 oz (62.3 kg)     There were no vitals filed for this clutter?: Yes  Do you always fasten your seatbelt when you are in a car?: Yes  Safety Interventions:  · Home safety tips provided, states  is adding non-slip mats in tubs and shower     Personalized Preventive Plan   Current Health Maintenance Status  Immunization History   Administered Date(s) Administered    COVID-19, Dwain Mejia, PF, 30mcg/0.3mL 02/13/2021, 03/11/2021    Influenza 09/19/2012    Influenza Virus Vaccine 09/01/2010, 09/01/2011    Influenza, High Dose (Fluzone 65 yrs and older) 10/29/2015, 09/28/2016, 09/23/2017, 12/05/2018    Influenza, Quadv, adjuvanted, 65 yrs +, IM, PF (Fluad) 12/10/2020, 11/17/2021    Influenza, Triv, inactivated, subunit, adjuvanted, IM (Fluad 65 yrs and older) 11/20/2019    Pneumococcal Conjugate 13-valent ([id number]) 10/29/2015    Pneumococcal Polysaccharide ([id number]) 11/12/2008    Tdap (Boostrix, Adacel) 08/18/2008, 12/05/2018    Zoster Live (Zostavax) 04/28/2010        Health Maintenance   Topic Date Due    COVID-19 Vaccine (3 - Booster for Dwain Mejia series) 09/11/2021    Annual Wellness Visit (AWV)  09/24/2021    Shingles Vaccine (3 of 3) 11/10/2021    Lipid screen  10/19/2022    Potassium monitoring  10/25/2022    Creatinine monitoring  10/25/2022    DTaP/Tdap/Td vaccine (3 - Td or Tdap) 12/05/2028    DEXA (modify frequency per FRAX score)  Completed    Flu vaccine  Completed    Pneumococcal 65+ years Vaccine  Completed    Hepatitis A vaccine  Aged Out    Hepatitis B vaccine  Aged Out    Hib vaccine  Aged Out    Meningococcal (ACWY) vaccine  Aged Out     Recommendations for [employer] Due: see orders and patient instructions/AVS. Discussed vaccines, getting booster next week.        .  Recommended screening schedule for the next 5-10 years is provided to the patient in written form: see Patient Instructions/AVS.    Alma Delia LOVE LPN, 12[birth year], performed the documented evaluation under the direct supervision of the attending

## 2021-11-18 ENCOUNTER — CARE COORDINATION (OUTPATIENT)
Dept: CASE MANAGEMENT | Age: 81
End: 2021-11-18

## 2021-11-18 NOTE — CARE COORDINATION
[facility] Transitions Follow Up Call    2021    Patient: Prosper Bruno  Patient : 1940   MRN: [id number]  Reason for Admission: Left Intertrochanteric Femoral Fracture  Discharge Date: 10/25/21 RARS: Readmission Risk Score: 13.8    Care Transitions Follow Up Call:    Patient states she is doing \"wonderful\". Denies any pain in left hip. Is ambulating with cane at this time. Left Hip Incision clean, dry and intact. Denies warmth, pain, redness, pus drainage, or swollen to area. No fever or chills. This was final [facility] appointment today. Patient confirms all medications are available and are being taken as directed. Patient had Transitional Follow up appointment. 2021 PCP  Patient Denies Transportation, Home, or Medication needs or concerns at this time. Instructed patient that this is the Final Transition Call and to call their Specialist/PCP for any problems or issues that may occur. Expresses understanding. Nathan Carrillo LPN    [phone]  Levi Johnson / [facility] Coordinator          Needs to be reviewed by the provider   Additional needs identified to be addressed with provider No  none             Method of communication with provider : none      Care Transition Nurse (CTN) contacted the patient by telephone to follow up after admission on 10/26/2021. Verified name and  with patient as identifiers. Discussed follow-up appointments. If no appointment was previously scheduled, appointment scheduling offered: Yes   Is follow up appointment scheduled within 7 days of discharge? Yes    Advance Care Planning:   Does patient have an Advance Directive:  reviewed and current. CTN reviewed discharge instructions, medical action plan and red flags with patient and discussed any barriers to care and/or understanding of plan of care after discharge.    Discussed appropriate site of care based on symptoms and resources available to patient including: When to call 911. The patient agrees to contact the PCP office for questions related to their healthcare. Patients top risk factors for readmission: lack of knowledge about disease, medical condition-Left Intertrochanteric Femoral Fracture and medication management  Interventions to address risk factors: Reviewed and followed up on pending diagnostic tests and treatments-NA    Non-[facility] follow up appointment(s): 11/17/2021 PCP    CTN provided contact information for future needs. No further follow-up call identified based on severity of symptoms and risk factors. Plan for next call:       Care Transitions Subsequent and Final Call    Subsequent and Final Calls  Do you have any ongoing symptoms?: No  Have your medications changed?: No  Do you have any questions related to your medications?: No  Do you currently have any active services?: Yes  Are you currently active with any services?: Home Health  Do you have any needs or concerns that I can assist you with?: No  Identified Barriers: None  Care Transitions Interventions  Other Interventions:            Follow Up  Future Appointments   Date Time Provider Silvano Cole   12/1/2021  1:00 PM Anam Garsia DO [facility]   3/9/2022 11:30 AM MD MARGA Lee [facility]   11/23/2022 11:15 AM SCHEDULE, REHAN Andres [facility]       Hattie Monahan LPN

## 2021-11-23 ENCOUNTER — TELEPHONE (OUTPATIENT)
Dept: FAMILY MEDICINE CLINIC | Age: 81
End: 2021-11-23

## 2021-11-23 NOTE — TELEPHONE ENCOUNTER
Manny from [facility] called stating that she needs a verbal for nursing to stop visits with the patient,  but PT will continue their visits.  Please advise

## 2021-12-01 ENCOUNTER — OFFICE VISIT (OUTPATIENT)
Dept: ORTHOPEDIC SURGERY | Age: 81
End: 2021-12-01

## 2021-12-01 VITALS
OXYGEN SATURATION: 99 % | RESPIRATION RATE: 19 BRPM | HEIGHT: 58 IN | HEART RATE: 80 BPM | WEIGHT: 121 LBS | BODY MASS INDEX: 25.4 KG/M2 | TEMPERATURE: 97.3 F

## 2021-12-01 DIAGNOSIS — Z09 POSTOP CHECK: Primary | ICD-10-CM

## 2021-12-01 PROCEDURE — 99024 POSTOP FOLLOW-UP VISIT: CPT | Performed by: STUDENT IN AN ORGANIZED HEALTH CARE EDUCATION/TRAINING PROGRAM

## 2021-12-01 NOTE — PROGRESS NOTES
Patient returns to the office with a post of left hip IM nail DOS: 10/18/21. Pt states she is doing well and has no pain today. Pt states she has done some exercises with progress and has no need for pain medications or therapy measures. Pt denies any new injuries.

## 2021-12-01 NOTE — PROGRESS NOTES
Date of surgery: 10-     Procedure:  Left hip cephalomedullary nailing on       History:  Betty Phillips is here in follow up regarding their left hip cephalomedullary nailing 6 weeks prior. Patient is doing well. They have 0/10 pain currently but does states she has episodic moments of 2/10 pain. They deny chest pain, SOB, calf pain,fever,wound drainage. No other issues. Patient walking with and without cane and states she is doing very well and does home exercise program on her own. Patient states they have been compliant with restrictions. Patient has been taking Eliquis for DVT prophylaxis as prescribed by separate physician     Physical:   Patient demonstrates appropriate mood and affect. Left lower extremity exam:   The incisions are clean, dry, intact and nontender with no erythema. They have minimal edema, the Leg compartments are soft . There are No cords or calf tenderness. No significant calf/ankle edema. They are neurovascularly intact distally. ROM of the hip is intact. .    Imaging:  X-rays taken today demonstrate 4 views of a left hip and pelvis demonstrating previous cephalomedullary nailing. No sign of any hardware failure. No sign of any displacement of fracture from intraoperative imaging. No sign of any new osseous abnormalities. Significant callus formation seen at fracture site. Impression: Status post above, doing well        Plan:   -continue the PT protocol  -Weightbearing as tolerated and range of motion as tolerated.   -Continue Eliquis as ordered by outside physician  -rest/elevation as needed  -f/u in 6 week(s) with new x-rays at next visit  -f/u sooner prn any issues

## 2021-12-31 PROBLEM — Z09 POSTOP CHECK: Status: RESOLVED | Noted: 2021-12-01 | Resolved: 2021-12-31

## 2022-01-05 ENCOUNTER — OFFICE VISIT (OUTPATIENT)
Dept: ORTHOPEDIC SURGERY | Age: 82
End: 2022-01-05

## 2022-01-05 VITALS
WEIGHT: 121 LBS | HEART RATE: 81 BPM | HEIGHT: 58 IN | OXYGEN SATURATION: 97 % | BODY MASS INDEX: 25.4 KG/M2 | RESPIRATION RATE: 16 BRPM

## 2022-01-05 DIAGNOSIS — Z09 POSTOP CHECK: Primary | ICD-10-CM

## 2022-01-05 PROCEDURE — 99024 POSTOP FOLLOW-UP VISIT: CPT | Performed by: STUDENT IN AN ORGANIZED HEALTH CARE EDUCATION/TRAINING PROGRAM

## 2022-01-05 NOTE — PATIENT INSTRUCTIONS
Follow up in 3 months  Continue to weight bear as tolerated. Patient Education        Knee Arthritis: Exercises  Introduction  Here are some examples of exercises for you to try. The exercises may be suggested for a condition or for rehabilitation. Start each exercise slowly. Ease off the exercises if you start to have pain. You will be told when to start these exercises and which ones will work best for you. How to do the exercises  Knee flexion with heel slide    1. Lie on your back with your knees bent. 2. Slide your heel back by bending your affected knee as far as you can. Then hook your other foot around your ankle to help pull your heel even farther back. 3. Hold for about 6 seconds, then rest for up to 10 seconds. 4. Repeat 8 to 12 times. 5. Switch legs and repeat steps 1 through 4, even if only one knee is sore. Quad sets    1. Sit with your affected leg straight and supported on the floor or a firm bed. Place a small, rolled-up towel under your knee. Your other leg should be bent, with that foot flat on the floor. 2. Tighten the thigh muscles of your affected leg by pressing the back of your knee down into the towel. 3. Hold for about 6 seconds, then rest for up to 10 seconds. 4. Repeat 8 to 12 times. 5. Switch legs and repeat steps 1 through 4, even if only one knee is sore. Straight-leg raises to the front    1. Lie on your back with your good knee bent so that your foot rests flat on the floor. Your affected leg should be straight. Make sure that your low back has a normal curve. You should be able to slip your hand in between the floor and the small of your back, with your palm touching the floor and your back touching the back of your hand. 2. Tighten the thigh muscles in your affected leg by pressing the back of your knee flat down to the floor. Hold your knee straight.   3. Keeping the thigh muscles tight and your leg straight, lift your affected leg up so that your heel is about 12 inches off the floor. Hold for about 6 seconds, then lower slowly. 4. Relax for up to 10 seconds between repetitions. 5. Repeat 8 to 12 times. 6. Switch legs and repeat steps 1 through 5, even if only one knee is sore. Active knee flexion    1. Lie on your stomach with your knees straight. If your kneecap is uncomfortable, roll up a washcloth and put it under your leg just above your kneecap. 2. Lift the foot of your affected leg by bending the knee so that you bring the foot up toward your buttock. If this motion hurts, try it without bending your knee quite as far. This may help you avoid any painful motion. 3. Slowly move your leg up and down. 4. Repeat 8 to 12 times. 5. Switch legs and repeat steps 1 through 4, even if only one knee is sore. Quadriceps stretch (facedown)    1. Lie flat on your stomach, and rest your face on the floor. 2. Wrap a towel or belt strap around the lower part of your affected leg. Then use the towel or belt strap to slowly pull your heel toward your buttock until you feel a stretch. 3. Hold for about 15 to 30 seconds, then relax your leg against the towel or belt strap. 4. Repeat 2 to 4 times. 5. Switch legs and repeat steps 1 through 4, even if only one knee is sore. Stationary exercise bike    1. If you do not have a stationary exercise bike at home, you can find one to ride at your local health club or community center. 2. Adjust the height of the bike seat so that your knee is slightly bent when your leg is extended downward. If your knee hurts when the pedal reaches the top, you can raise the seat so that your knee does not bend as much. 3. Start slowly. At first, try to do 5 to 10 minutes of cycling with little to no resistance. Then increase your time and the resistance bit by bit until you can do 20 to 30 minutes without pain. 4. If you start to have pain, rest your knee until your pain gets back to the level that is normal for you.  Or cycle for less time or with less effort. Follow-up care is a key part of your treatment and safety. Be sure to make and go to all appointments, and call your doctor if you are having problems. It's also a good idea to know your test results and keep a list of the medicines you take. Where can you learn more? Go to [email or web address] and sign in to your [employer] account. Enter C159 in the [employer] box to learn more about \"Knee Arthritis: Exercises. \"     If you do not have an account, please click on the \"Sign Up Now\" link. Current as of: July 1, 2021               Content Version: 13.1  © 2006-2021 [employer]wise[employer]. Care instructions adapted under license by [facility] ([facility]). If you have questions about a medical condition or this instruction, always ask your healthcare professional. [address] any warranty or liability for your use of this information.

## 2022-01-05 NOTE — PROGRESS NOTES
Patient comes in today for a follow up for a Left Femur IM Nail on 10/18/21. Patient is ambulating with a cane. She rates her left hip pain at 0/10 but c/o left knee pain with weight bearing. She states that she has been trying to stretch her calf and that seems to help the knee pain. She denies any new falls or injuries.

## 2022-01-05 NOTE — PROGRESS NOTES
Date of surgery: 10-     Procedure:  Left hip cephalomedullary nailing on       History:  Robert Peng is here for 3-month follow-up regarding their left hip cephalomedullary. Patient is doing well. They have 0/10 pain currently but does states she has episodic moments of 2/10 pain. They deny chest pain, SOB, calf pain,fever,wound drainage. No other issues. Patient walking with and without cane and states she is doing very well and does home exercise program on her own. Patient states they have been compliant with restrictions. Patient has been taking Eliquis for DVT prophylaxis as prescribed by separate physician     Physical:   Patient demonstrates appropriate mood and affect. Left lower extremity exam:   The incisions are clean, dry, intact and nontender with no erythema. They have minimal edema, the Leg compartments are soft . There are No cords or calf tenderness. No significant calf/ankle edema. They are neurovascularly intact distally. ROM of the hip is intact. .    Imaging:  X-rays taken today demonstrate 4 views of a left hip and pelvis demonstrating previous cephalomedullary nailing. No sign of any hardware failure. No sign of any displacement of fracture from previous postoperative imaging. No sign of any new osseous abnormalities. Significant callus formation seen at fracture site, increased from prior films. Impression: Status post above, doing well        Plan:   -continue the PT protocol  -Weightbearing as tolerated and range of motion as tolerated.   -Continue Eliquis as ordered by outside physician  -rest/elevation as needed  -f/u in 3 months with new x-rays at next visit  -f/u sooner prn any issues

## 2022-02-04 PROBLEM — Z09 POSTOP CHECK: Status: RESOLVED | Noted: 2021-12-01 | Resolved: 2022-02-04

## 2022-02-16 ENCOUNTER — OFFICE VISIT (OUTPATIENT)
Dept: ORTHOPEDIC SURGERY | Age: 82
End: 2022-02-16
Payer: MEDICARE

## 2022-02-16 VITALS
OXYGEN SATURATION: 99 % | HEIGHT: 58 IN | WEIGHT: 121 LBS | HEART RATE: 81 BPM | BODY MASS INDEX: 25.4 KG/M2 | RESPIRATION RATE: 16 BRPM

## 2022-02-16 DIAGNOSIS — M17.12 PRIMARY OSTEOARTHRITIS OF LEFT KNEE: ICD-10-CM

## 2022-02-16 PROCEDURE — 20610 DRAIN/INJ JOINT/BURSA W/O US: CPT | Performed by: STUDENT IN AN ORGANIZED HEALTH CARE EDUCATION/TRAINING PROGRAM

## 2022-02-16 ASSESSMENT — ENCOUNTER SYMPTOMS
COLOR CHANGE: 0
NAUSEA: 0
BACK PAIN: 0
VOMITING: 0
VOICE CHANGE: 0
SHORTNESS OF BREATH: 0
COUGH: 0
WHEEZING: 0
SORE THROAT: 0

## 2022-02-16 NOTE — PROGRESS NOTES
Patient is a 80year old female. Patient is in the office today with left knee pain. Pain scale  0/10 currently but increases with activity. She denies a previous injury or surgery to her left knee. She states that her left knee has been bothering her since her hip surgery on 10/18/21. Her pain in located on the medial portion of her knee and sometimes down into the anterior portion of there tibia. She states that a couple days ago her knee gave out.    Occupation: retired

## 2022-02-16 NOTE — PATIENT INSTRUCTIONS
Continue to weight bear as tolerated  Continue range of motion  Ice and elevate as needed  Tylenol or Motrin for pain  Injection given today in the office   Rest for 24-48 hours  Follow up in 3 months

## 2022-02-16 NOTE — PROGRESS NOTES
2/16/2022   Chief Complaint   Patient presents with    Knee Pain     left        History of Present Illness:                             July Rojas is a 80 y.o. female well-known to myself from previous left inotrope fracture and is being seen today for left knee pain. She states that she has had medial sided knee pain since the surgery. It is at the medial joint line without any mechanical symptoms. No significant prior left knee pain or injury. Patient states left hip continues to heal well and she is ambulating with a cane and states she is quite happy with her progress she is made. The pain's location is medial joint line of left knee. she describes the symptoms as aching, dull. Symptoms improve with rest. Symptoms worsen with deep knee bending, getting up from a chair, weight bearing, sitting for prolonged periods of time, twisting activities. Patient denies prior injury to knee, denies numbness, tingling, fever, chills. Denies swelling or effusions. No prominent mechanical symptoms. Denies instability. Worse with prolonged weightbearing. Better with rest.    Treatment thus far has included rest, activity modifications, oral medications with minimal relief. Here today to discuss diagnosis and treatment options. Is affecting ADLs. Pain is 3/10 at it's worst.  Currently no pain when not weightbearing    No advanced imaging thus far    Outside reports reviewed: none. Patient's medications, allergies, past medical, surgical, social and family histories were reviewed and updated as appropriate. MA HPI: Patient is a 80year old female. Patient is in the office today with left knee pain. Pain scale  0/10 currently but increases with activity. She denies a previous injury or surgery to her left knee. She states that her left knee has been bothering her since her hip surgery on 10/18/21.  Her pain in located on the medial portion of her knee and sometimes down into the anterior portion of there tibia. She states that a couple days ago her knee gave out. Occupation: retired         Medical History  Patient's medications, allergies, past medical, surgical, social and family histories were reviewed and updated as appropriate.     Past Medical History:   Diagnosis Date    Compression fracture of lumbar spine, non-traumatic ([address] 2013    Compression fx, thoracic spine ([address]     T6 - scheduled for vertebroplasty 2015    Degenerative disc disease, lumbar 2013    Environmental allergies     Hx of colonic polyps 2006    Hypertension     Osteoarthritis of lumbar spine     Osteoporosis     PONV (postoperative nausea and vomiting)     \"just with the hernia surgery\"    Spondylolisthesis of lumbar region 2013    Surgical menopause     TIA (transient ischemic attack) 2006    Unspecified cerebral artery occlusion with cerebral infarction     \"had light stroke 6 yrs ago-everything went black and thought I was gone but only lasted about 10 minuts\" no residual now     Past Surgical History:   Procedure Laterality Date    CHOLECYSTECTOMY  age 29's    open    COLONOSCOPY  2010    DILATION AND CURETTAGE OF UTERUS  age31's    FEMUR FRACTURE SURGERY Left 10/18/2021    FEMUR IM NAIL PB INSERTION performed by Apolinar Zambrano DO at [facility] Right 2014   [address]      right ing hernia    HYSTERECTOMY  age 29's    \"took both ovaries\"    TONSILLECTOMY  age 22    VERTEBROPLASTY  8/24/15     Family History   Problem Relation Age of Onset    Heart Attack Mother         Myocardial Infarction- 76    Heart Disease Mother         MI    Diabetes Mother     High Blood Pressure Mother     Diabetes Sister         Type 2    Other Brother         AAA    Heart Disease Brother         MI    Heart Disease Father         MI    Diabetes Sister     Heart Disease Brother         MI     Social History     Socioeconomic History    Marital status:      Spouse name: Not on file    Number of children: Not on file    Years of education: Not on file    Highest education level: Not on file   Occupational History    Not on file   Tobacco Use    Smoking status: Former Smoker     Packs/day: 3.00     Years: 2.00     Pack years: 6.00     Quit date: 1959     Years since quittin.2    Smokeless tobacco: Never Used   Substance and Sexual Activity    Alcohol use: No     Comment:        CAFFEINE: 2 - cups of 1/2 diet soda, 1/2 citrus & water mixture daily & tea occasionally.  Drug use: No    Sexual activity: Not on file   Other Topics Concern    Not on file   Social History Narrative    Not on file     Social Determinants of Health     Financial Resource Strain: Low Risk     Difficulty of Paying Living Expenses: Not hard at all   Food Insecurity: No Food Insecurity    Worried About Running Out of Food in the Last Year: Never true    [address] in the Last Year: Never true   Transportation Needs:     Lack of Transportation (Medical): Not on file    Lack of Transportation (Non-Medical):  Not on file   Physical Activity:     Days of Exercise per Week: Not on file    Minutes of Exercise per Session: Not on file   Stress:     Feeling of Stress : Not on file   Social Connections:     Frequency of Communication with Friends and Family: Not on file    Frequency of Social Gatherings with Friends and Family: Not on file    Attends Bahai Services: Not on file    Active Member of Clubs or Organizations: Not on file    Attends Club or Organization Meetings: Not on file    Marital Status: Not on file   Intimate Partner Violence:     Fear of Current or Ex-Partner: Not on file    Emotionally Abused: Not on file    Physically Abused: Not on file    Sexually Abused: Not on file   Housing Stability:     Unable to Pay for Housing in the Last Year: Not on file    Number of [place] in the Last Year: Not on file    Unstable Housing in the Last Year: Not on file     Current Outpatient Medications   Medication Sig Dispense Refill    alendronate (FOSAMAX) 70 MG tablet Take 1 tablet by mouth every 7 days 13 tablet 3    apixaban (ELIQUIS) 5 MG TABS tablet Take 1 tablet by mouth 2 times daily 60 tablet 0    atorvastatin (LIPITOR) 10 MG tablet Take 1 tablet by mouth daily 90 tablet 1    metoprolol succinate (TOPROL XL) 50 MG extended release tablet Take 1 tablet by mouth daily 90 tablet 1    fluticasone (FLONASE) 50 MCG/ACT nasal spray 2 sprays by Each Nostril route daily 1 Bottle 1    Ascorbic Acid (VITAMIN C) 500 MG tablet Take 1,000 mg by mouth daily      calcium-vitamin D (OSCAL-500) 500-200 MG-UNIT per tablet Take 1 tablet by mouth daily.  ferrous sulfate (IRON 325) 325 (65 Fe) MG tablet Take 1 tablet by mouth daily (with breakfast) (Patient not taking: Reported on 11/17/2021) 90 tablet 0     No current facility-administered medications for this visit. No Known Allergies      Review of Systems   Constitutional: Negative for activity change, fatigue and fever. HENT: Negative for sneezing, sore throat and voice change. Respiratory: Negative for cough, shortness of breath and wheezing. Cardiovascular: Negative for leg swelling. Gastrointestinal: Negative for nausea and vomiting. Musculoskeletal: Positive for arthralgias. Negative for back pain, gait problem, joint swelling, myalgias, neck pain and neck stiffness. Skin: Negative for color change, rash and wound. Neurological: Negative for weakness and numbness. Psychiatric/Behavioral: Negative for behavioral problems, confusion and self-injury. Examination:  General Exam:  Vitals: Pulse 81   Resp 16   Ht 4' 10\" (1.473 m)   Wt 121 lb (54.9 kg)   LMP  (LMP Unknown)   SpO2 99%   BMI 25.29 kg/m²    Physical Exam  Constitutional:       General: She is not in acute distress.      Appearance: Normal appearance. She is normal weight. HENT:      Head: Normocephalic and atraumatic. Eyes:      General:         Right eye: No discharge. Left eye: No discharge. Extraocular Movements: Extraocular movements intact. Cardiovascular:      Pulses: Normal pulses. Pulmonary:      Effort: Pulmonary effort is normal.      Breath sounds: Normal breath sounds. Musculoskeletal:         General: Tenderness present. No swelling, deformity or signs of injury. Cervical back: Normal range of motion. Right hip: Normal.      Left hip: Normal.      Right upper leg: Normal.      Left upper leg: Normal.      Left knee: Bony tenderness present. No swelling, deformity, effusion, erythema, ecchymosis, lacerations or crepitus. Normal range of motion. Tenderness present over the medial joint line. No lateral joint line tenderness. No LCL laxity, MCL laxity, ACL laxity or PCL laxity. Normal alignment, normal meniscus and normal patellar mobility. Normal pulse. Instability Tests: Anterior drawer test negative. Posterior drawer test negative. Medial Germain test negative and lateral Germain test negative. Right lower leg: Normal. No edema. Left lower leg: Normal. No edema. Skin:     Capillary Refill: Capillary refill takes less than 2 seconds. Neurological:      General: No focal deficit present. Mental Status: She is alert and oriented to person, place, and time. Mental status is at baseline. Gait: Gait normal.   Psychiatric:         Mood and Affect: Mood normal.         Behavior: Behavior normal.        LEFT KNEE EXAMINATION       OBSERVATION / INSPECTION     Gait: Minimally antalgic    Alignment: Neutral     Scars: None     Muscle atrophy: Minimal    Effusion: None     Warmth: None     Discoloration: none       TENDERNESS / CREPITUS (T / C):       Patella - / -     Lateral joint line - / -  Medial joint line  + / -     Peripatellar lateral - / -  Peripatellar medial  - / -     Medial plica - / -  Lateral plica - / -    Patellar tendon - / -   Prepatellar Bursa - / -     Popliteal fossa - / -    Gastrocnemius - / -    Quadricep - / -   Quad tendon - / -      Tibial tubercle - / -     Thigh/hamstring - / -  Pes anserine/HS - / -     ITB - / -     Tibia - / -   Fibula - / -    Tib/fib joint - / -     MFC - / -    LFC - / -     MCL: Proximal - / -  Distal - / -    LCL - / -    MCL - / -      ROM: (* = pain)  PASSIVE  ACTIVE     Left :    0 / 145  0 / 145      Right :    0 / 145  0 / 145      PATELLOFEMORAL EXAMINATION:    See above noted areas of tenderness. Patella position     Subluxation / dislocation: Centered     Sup. / Inf; Normal     Crepitus (PF): Absent     Patellar Mobility:     Medial-lateral: Normal     Superior-inferior: Normal     Inferior tilt Normal     Patellar tendon: Normal     Lateral tilt: Normal     J-sign: None     Patellofemoral grind: No pain         MENISCAL SIGNS:     Pain on terminal extension: -    Pain on terminal flexion: -      LIGAMENT EXAMINATION:    ACL / Lachman: normal (-1 to 2mm)      PCL-Post.  drawer: normal 0 to 2mm    MCL- Valgus: normal 0 to 2mm    LCL- Varus:  normal 0 to 2mm      STRENGTH: (* = with pain) PAINFUL SIDE    Quadricep 5/5    Hamstrin/5      EXTREMITY NEURO-VASCULAR EXAMINATION:     Sensation:  Grossly intact to light touch all dermatomal regions. Motor Function:  Fully intact motor function at hip, knee, foot and ankle      DTRs;  quadriceps and  achilles 2+. No clonus and downgoing Babinski. Vascular status:  DP and PT pulses 2+, brisk capillary refill, symmetric. Diagnostic testing:  X-ray images were reviewed by myself and discussed with the patient:  4 views of left knee including distal femur demonstrate previous cephalomedullary nail with distal screw fixation. Minimal osteoarthritic changes at the knee most prominent at medial joint line.   No sign of any acute osseous abnormalities including type of fracture. No sign of any soft tissue avulsion type injury. No obvious effusion. Office Procedures:  No orders of the defined types were placed in this encounter. Left lateral Infrapatetellar Injection Procedure Note   Pre-operative Diagnosis: Left knee osteoarthritis   Post-operative Diagnosis: same   Indications: Symptomatic relief of osteoarthritis   Anesthesia: Lidocaine 1% and marcaine 0.5% without epinephrine without added sodium bicarbonate   Procedure Details   Verbal consent was obtained for the procedure. The knee was prepped with alcohol. A 22 gauge needle was advanced into the lateral joint space through lateral infrapatellar approach without difficulty The space was then injected with 1 ml 1% lidocaine and 1 ml 0.5% marcaine and 1 ml of triamcinolone (KENALOG) 40mg/ml. The injection site was cleansed with isopropyl alcohol and a dressing was applied. Complications: None; patient tolerated the procedure well. Symptoms were improved immediately after the injection. Assessment and Plan    A: Left knee osteoarthritis    P:   I had a thorough conversation with the patient regarding her left knee pain. I explained to the patient that there is a chance that she is getting referred pain from the hip down to the knee but because of the pinpoint tenderness palpation I feel confident that her knee pain is from true knee pathology including her mild osteoarthritic changes on x-ray. I offered a cortisone injection and patient agreed. This was performed without complication and she had immediate pain relief. She was educated on the injection and side effects. She will continue to ice the knee and continue her home exercise program.  She will follow-up as scheduled for the left hip and we will also evaluate her knee at that time if it remains painful. All questions were answered and patient voiced understanding.     Electronically signed by Raciel Andersen DO on 2/16/2022 at 1:08 PM

## 2022-03-08 DIAGNOSIS — I47.1 SVT (SUPRAVENTRICULAR TACHYCARDIA) (HCC): ICD-10-CM

## 2022-03-08 DIAGNOSIS — I10 ESSENTIAL HYPERTENSION: Chronic | ICD-10-CM

## 2022-03-08 DIAGNOSIS — G25.0 ESSENTIAL TREMOR: ICD-10-CM

## 2022-03-08 RX ORDER — METOPROLOL SUCCINATE 50 MG/1
TABLET, EXTENDED RELEASE ORAL
Qty: 90 TABLET | Refills: 3 | OUTPATIENT
Start: 2022-03-08

## 2022-03-09 ENCOUNTER — OFFICE VISIT (OUTPATIENT)
Dept: FAMILY MEDICINE CLINIC | Age: 82
End: 2022-03-09
Payer: MEDICARE

## 2022-03-09 VITALS
HEART RATE: 65 BPM | SYSTOLIC BLOOD PRESSURE: 142 MMHG | WEIGHT: 125.2 LBS | OXYGEN SATURATION: 97 % | DIASTOLIC BLOOD PRESSURE: 80 MMHG | BODY MASS INDEX: 26.28 KG/M2 | HEIGHT: 58 IN

## 2022-03-09 DIAGNOSIS — I10 ESSENTIAL HYPERTENSION: Primary | Chronic | ICD-10-CM

## 2022-03-09 DIAGNOSIS — I47.1 SVT (SUPRAVENTRICULAR TACHYCARDIA) (HCC): ICD-10-CM

## 2022-03-09 DIAGNOSIS — R39.15 URINARY URGENCY: ICD-10-CM

## 2022-03-09 DIAGNOSIS — G25.0 ESSENTIAL TREMOR: ICD-10-CM

## 2022-03-09 DIAGNOSIS — M81.0 AGE-RELATED OSTEOPOROSIS WITHOUT CURRENT PATHOLOGICAL FRACTURE: ICD-10-CM

## 2022-03-09 LAB
BILIRUBIN, POC: ABNORMAL
BLOOD URINE, POC: ABNORMAL
CLARITY, POC: CLEAR
COLOR, POC: YELLOW
GLUCOSE URINE, POC: ABNORMAL
KETONES, POC: ABNORMAL
LEUKOCYTE EST, POC: ABNORMAL
NITRITE, POC: ABNORMAL
PH, POC: 6.5
PROTEIN, POC: ABNORMAL
SPECIFIC GRAVITY, POC: 1.02
UROBILINOGEN, POC: 0.2

## 2022-03-09 PROCEDURE — 99214 OFFICE O/P EST MOD 30 MIN: CPT | Performed by: FAMILY MEDICINE

## 2022-03-09 PROCEDURE — 81002 URINALYSIS NONAUTO W/O SCOPE: CPT | Performed by: FAMILY MEDICINE

## 2022-03-09 RX ORDER — ALENDRONATE SODIUM 70 MG/1
70 TABLET ORAL
Qty: 13 TABLET | Refills: 3 | Status: SHIPPED | OUTPATIENT
Start: 2022-03-09

## 2022-03-09 RX ORDER — METOPROLOL SUCCINATE 50 MG/1
50 TABLET, EXTENDED RELEASE ORAL DAILY
Qty: 90 TABLET | Refills: 1 | Status: SHIPPED | OUTPATIENT
Start: 2022-03-09 | End: 2022-08-31 | Stop reason: SDUPTHER

## 2022-03-09 RX ORDER — TOLTERODINE 4 MG/1
4 CAPSULE, EXTENDED RELEASE ORAL DAILY
Qty: 30 CAPSULE | Refills: 0 | Status: SHIPPED | OUTPATIENT
Start: 2022-03-09 | End: 2022-04-11 | Stop reason: DRUGHIGH

## 2022-03-09 ASSESSMENT — ENCOUNTER SYMPTOMS: SHORTNESS OF BREATH: 0

## 2022-03-09 NOTE — PATIENT INSTRUCTIONS
PLEASE BRING YOUR MEDICATIONS TO ALL APPOINTMENTS      Please check MY CHART for test results. If you have forgotten your password, please call [phone]. The diagnoses and medications listed in this after visit summary may not be up to date. Please check MY CHART in 28-48 hours for possible corrections. Late cancellation policy: So that we can better accommodate people who are sick, please give our office 24 hour notice for an appointment cancellation. Missed appointments: Your care is very important to us. It is important that you keep your scheduled appointments. Multiple missed appointments will lead to a dismissal from the office. Patients arriving late will be worked into the schedule as time permits, with patients arriving on time taken as scheduled. Late arriving patients are more than welcome to wait or reschedule their appointments. Please allow 5-7 business days for paperwork to be processed. HERE ARE SOME LIFE CHANGING TIPS      1. Take your blood pressure medications at bedtime to reduce your chance of heart attack or stroke  2. Fever in kids:  Give both Tylenol and Ibuprofen at the same time rather than staggering them   3. Follow these tips to reduce childhood obesity: Reduce unnecessary exposure to antibiotics, consume whole milk instead of skim milk, watch public TV instead of regular TV (less exposure to junk food commercials), and reduce traumatic experiences. 4. 1 egg per day is good for your heart  5. Alternate day fasting does promote weight loss. Skipping breakfast increases your risk of obesity  6. Artificially sweetened drinks increase all cause mortality (strokes, body mass index, cardiovascular disease)  7. Kale consumption can reduce onset of dementia  8. Walking at least 8000 steps per day and resistance exercise 2-3 x per week are good for your heart  9. Brushing teeth 3 times per day can decrease chance of getting diabetes  10.  Antibiotic use is associated with a lifetime increased risk of breast cancer and heart disease. Patient Education        Bladder Training: Care Instructions  Your Care Instructions     Bladder training is used to treat urge incontinence and stress incontinence. Urge incontinence means that the need to urinate comes on so fast that you can't get to a toilet in time. Stress incontinence means that you leak urine because of pressure on your bladder. For example, it may happen when you laugh, cough, or lift something heavy. Bladder training can increase how long you can wait before you have to urinate. It can also help your bladder hold more urine. And it can give you better control over the urge to urinate. It is important to remember that bladder training takes a few weeks to a few months to make a difference. You may not see results right away, but don't give up. Follow-up care is a key part of your treatment and safety. Be sure to make and go to all appointments, and call your doctor if you are having problems. It's also a good idea to know your test results and keep a list of the medicines you take. How can you care for yourself at home? Work with your doctor to come up with a bladder training program that is right for you. You may use one or more of the following methods. Delayed urination  · In the beginning, try to keep from urinating for 5 minutes after you first feel the need to go. · While you wait, take deep, slow breaths to relax. Kegel exercises can also help you delay the need to go to the bathroom. · After some practice, when you can easily wait 5 minutes to urinate, try to wait 10 minutes before you urinate. · Slowly increase the waiting period until you are able to control when you have to urinate. Scheduled urination  · Empty your bladder when you first wake up in the morning. · Schedule times throughout the day when you will urinate.   · Start by going to the bathroom every hour, even if you don't need to go. · Slowly increase the time between trips to the bathroom. · When you have found a schedule that works well for you, keep doing it. · If you wake up during the night and have to urinate, do it. Apply your schedule to waking hours only. Kegel exercises  These tighten and strengthen pelvic muscles, which can help you control the flow of urine. To do Kegel exercises:  · Squeeze the same muscles you would use to stop your urine. Your belly and thighs should not move. · Hold the squeeze for 3 seconds, and then relax for 3 seconds. · Start with 3 seconds. Then add 1 second each week until you are able to squeeze for 10 seconds. · Repeat the exercise 10 to 15 times a session. Do three or more sessions a day. When should you call for help? Watch closely for changes in your health, and be sure to contact your doctor if:    · Your incontinence is getting worse.     · You do not get better as expected. Where can you learn more? Go to [email or web address] org and sign in to your [employer] account. Enter E621 in the [facility] box to learn more about \"Bladder Training: Care Instructions. \"     If you do not have an account, please click on the \"Sign Up Now\" link. Current as of: February 10, 2021               Content Version: 13.1  © 7427-4330 [employer]wise, Incorporated. Care instructions adapted under license by [facility], [place] ([facility]). If you have questions about a medical condition or this instruction, always ask your healthcare professional. [place] [address] any warranty or liability for your use of this information.

## 2022-03-09 NOTE — PROGRESS NOTES
Patient ID: Anne Terry [id number]    . Chief Complaint   Patient presents with    Hyperlipidemia    Hypertension         Hyperlipidemia  Pertinent negatives include no shortness of breath. Hypertension  This is a chronic problem. The current episode started more than 1 year ago. The problem is unchanged. The problem is controlled. Pertinent negatives include no palpitations or shortness of breath. Risk factors for coronary artery disease include post-menopausal state. Past treatments include beta blockers and diuretics. Urinary Frequency   This is a new problem. The current episode started more than 1 month ago. The problem occurs intermittently (aggravated by coughing or sneezing). The problem has been unchanged. Associated symptoms include frequency and urgency. Treatments tried: kegal exercisies. Tremor: doing well with beta blocker and using \"clothespins\"    Review of Systems   Respiratory: Negative for shortness of breath. Cardiovascular: Negative for palpitations. Genitourinary: Positive for frequency and urgency.        Patient Active Problem List   Diagnosis    Essential hypertension    Osteoporosis    Hx of colonic polyps    Degenerative disc disease, lumbar    Spondylolisthesis of lumbar region    Osteoarthritis of cervical spine    SVT (supraventricular tachycardia) (HCC)    Chronic left-sided low back pain with left-sided sciatica    Nonrheumatic aortic valve insufficiency    Mitral regurgitation    History of compression fracture of spine    Anticoagulated    Paroxysmal atrial fibrillation (HCC)    Nondisplaced intertrochanteric fracture of left femur, subsequent encounter for closed fracture with routine healing       Past Surgical History:   Procedure Laterality Date    CHOLECYSTECTOMY  age 29's    open    COLONOSCOPY  12/2010    DILATION AND CURETTAGE OF UTERUS  age29's    FEMUR FRACTURE SURGERY Left 10/18/2021    FEMUR IM NAIL PB INSERTION performed by Nani Rene,  at [facility] Right 2014    HERNIA REPAIR  2011    right ing hernia    HYSTERECTOMY  age 29's    \"took both ovaries\"    TONSILLECTOMY  age 22    VERTEBROPLASTY  8/24/15       Family History   Problem Relation Age of Onset    Heart Attack Mother         Myocardial Infarction- 76    Heart Disease Mother         MI    Diabetes Mother     High Blood Pressure Mother     Diabetes Sister         Type 2    Other Brother         AAA    Heart Disease Brother         MI    Heart Disease Father         MI    Diabetes Sister     Heart Disease Brother         MI       Current Outpatient Medications on File Prior to Visit   Medication Sig Dispense Refill    ferrous sulfate (IRON 325) 325 (65 Fe) MG tablet Take 1 tablet by mouth daily (with breakfast) 90 tablet 0    apixaban (ELIQUIS) 5 MG TABS tablet Take 1 tablet by mouth 2 times daily 60 tablet 0    atorvastatin (LIPITOR) 10 MG tablet Take 1 tablet by mouth daily 90 tablet 1    fluticasone (FLONASE) 50 MCG/ACT nasal spray 2 sprays by Each Nostril route daily 1 Bottle 1    Ascorbic Acid (VITAMIN C) 500 MG tablet Take 1,000 mg by mouth daily      calcium-vitamin D (OSCAL-500) 500-200 MG-UNIT per tablet Take 1 tablet by mouth daily. No current facility-administered medications on file prior to visit. Objective:         Physical Exam  Vitals and nursing note reviewed. Constitutional:       Appearance: She is well-developed. HENT:      Head: Normocephalic and atraumatic. Neck:      Thyroid: No thyromegaly. Trachea: No tracheal deviation. Cardiovascular:      Rate and Rhythm: Normal rate and regular rhythm. Heart sounds: Normal heart sounds, S1 normal and S2 normal.   Pulmonary:      Effort: Pulmonary effort is normal. No respiratory distress. Breath sounds: Normal breath sounds. No wheezing. Abdominal:      General: There is no distension.       Palpations: Abdomen is soft. There is no mass. Tenderness: There is no abdominal tenderness. Musculoskeletal:      Cervical back: Neck supple. Right lower leg: No edema. Left lower leg: No edema. Skin:     General: Skin is warm and dry. Neurological:      Mental Status: She is alert. Motor: No tremor. Vitals:    03/09/22 1128 03/09/22 1131   BP: (!) 140/76 (!) 142/80   Site: Left Upper Arm    Position: Sitting    Cuff Size: Medium Adult    Pulse: 65    SpO2: 97%    Weight: 125 lb 3.2 oz (56.8 kg)    Height: 4' 10\" (1.473 m)      Body mass index is 26.17 kg/m². Wt Readings from Last 3 Encounters:   03/09/22 125 lb 3.2 oz (56.8 kg)   02/16/22 121 lb (54.9 kg)   01/05/22 121 lb (54.9 kg)     BP Readings from Last 3 Encounters:   03/09/22 (!) 142/80   11/17/21 118/70   11/17/21 118/70          Results for orders placed or performed in visit on 03/09/22   POCT Urinalysis no Micro   Result Value Ref Range    Color, UA yellow     Clarity, UA clear     Glucose, UA POC Neg     Bilirubin, UA Neg     Ketones, UA Neg     Spec Grav, UA 1.025     Blood, UA POC Trace     pH, UA 6.5     Protein, UA POC neg     Urobilinogen, UA 0.2     Leukocytes, UA Neg     Nitrite, UA Neg      The ASCVD Risk score (Marta Osborne, et al., 2013) failed to calculate for the following reasons: The 2013 ASCVD risk score is only valid for ages 36 to 78  Lab Review   No visits with results within 2 Month(s) from this visit.    Latest known visit with results is:   Office Visit on 11/17/2021   Component Date Value    WBC 11/17/2021 5.5     RBC 11/17/2021 3.87*    Hemoglobin 11/17/2021 12.3     Hematocrit 11/17/2021 37.9     MCV 11/17/2021 98.1     MCH 11/17/2021 31.7     MCHC 11/17/2021 32.4     RDW 11/17/2021 15.9*    Platelets 45/74/8284 218     MPV 11/17/2021 9.1     Neutrophils % 11/17/2021 62.8     Lymphocytes % 11/17/2021 26.1     Monocytes % 11/17/2021 8.5     Eosinophils % 11/17/2021 2.0     Basophils % 11/17/2021 0.6     Neutrophils Absolute 11/17/2021 3.4     Lymphocytes Absolute 11/17/2021 1.4     Monocytes Absolute 11/17/2021 0.5     Eosinophils Absolute 11/17/2021 0.1     Basophils Absolute 11/17/2021 0.0     Vitamin B-12 11/17/2021 >2000*    Folate 11/17/2021 19.75            Assessment:       Diagnosis Orders   1. Essential hypertension  metoprolol succinate (TOPROL XL) 50 MG extended release tablet   2. SVT (supraventricular tachycardia) (HCC)  metoprolol succinate (TOPROL XL) 50 MG extended release tablet   3. Essential tremor  metoprolol succinate (TOPROL XL) 50 MG extended release tablet   4. Age-related osteoporosis without current pathological fracture  alendronate (FOSAMAX) 70 MG tablet   5. Urinary urgency  tolterodine (DETROL LA) 4 MG extended release capsule    POCT Urinalysis no Micro           Plan:      Hypertension stable with metoprolol. Continue taking. Tremor stable with metoprolol. Continue taking    Continue the Fosamax for the osteoporosis    Urine unremarkable. We will put her on Detrol and recheck in 1 month. Return in about 25 weeks (around 8/31/2022) for HTN, Hyperlipid.

## 2022-04-06 ENCOUNTER — OFFICE VISIT (OUTPATIENT)
Dept: ORTHOPEDIC SURGERY | Age: 82
End: 2022-04-06
Payer: MEDICARE

## 2022-04-06 VITALS
RESPIRATION RATE: 17 BRPM | BODY MASS INDEX: 26.24 KG/M2 | HEIGHT: 58 IN | HEART RATE: 78 BPM | OXYGEN SATURATION: 98 % | WEIGHT: 125 LBS

## 2022-04-06 DIAGNOSIS — Z09 POSTOP CHECK: Primary | ICD-10-CM

## 2022-04-06 PROCEDURE — 99213 OFFICE O/P EST LOW 20 MIN: CPT | Performed by: STUDENT IN AN ORGANIZED HEALTH CARE EDUCATION/TRAINING PROGRAM

## 2022-04-06 ASSESSMENT — ENCOUNTER SYMPTOMS
COUGH: 0
VOMITING: 0
WHEEZING: 0
VOICE CHANGE: 0
SHORTNESS OF BREATH: 0
NAUSEA: 0
SORE THROAT: 0
BACK PAIN: 0
COLOR CHANGE: 0

## 2022-04-06 NOTE — PROGRESS NOTES
Patient comes in today for a 6 month follow up of a left femur IM nail on 10/18/21. She was last seen in our office on  1/5/22. She rates her hip pain at 0/10. She was also seen in our office for knee pain on 2/16/22. She states that still has knee pain when walking. She rates her knee pain at 5/10. She denies any new falls or injuries.

## 2022-04-06 NOTE — PROGRESS NOTES
4/6/2022   Chief Complaint   Patient presents with    Hip Pain     L femur IM Nail DOS 10/18/21                          Date of surgery: 10-     Procedure:  Left hip cephalomedullary nailing on       History:  Juliette Yeboah is here for 6-month follow-up regarding their left hip cephalomedullary. Patient is doing well. They have 0/10 pain regarding the left hip and thigh but continues with occasional left knee pain. She was seen 2 months prior for left knee pain was given a cortisone injection and states that this significantly helped her knee pain. They deny chest pain, SOB, calf pain,fever,wound drainage. No other issues. Patient continues to walk without assistive device and is doing a home exercise program on her own without issue. She states she does occasionally use a cane. Patient has been taking Eliquis for DVT prophylaxis as prescribed by separate physician      Medical History  Patient's medications, allergies, past medical, surgical, social and family histories were reviewed and updated as appropriate.     Past Medical History:   Diagnosis Date    Compression fracture of lumbar spine, non-traumatic ([address] 6/2013    Compression fx, thoracic spine (HCC)     T6 - scheduled for vertebroplasty 8/24/2015    Degenerative disc disease, lumbar 6/26/2013    Environmental allergies     Hx of colonic polyps 7/2006    Hypertension 2006    Osteoarthritis of lumbar spine     Osteoporosis     PONV (postoperative nausea and vomiting)     \"just with the hernia surgery\"    Spondylolisthesis of lumbar region 6/26/2013    Surgical menopause     TIA (transient ischemic attack) 1/2006    Unspecified cerebral artery occlusion with cerebral infarction     \"had light stroke 6 yrs ago-everything went black and thought I was gone but only lasted about 10 minuts\" no residual now     Past Surgical History:   Procedure Laterality Date    CHOLECYSTECTOMY  age 29's    open    COLONOSCOPY  12/2010    DILATION AND CURETTAGE OF UTERUS  age31's    FEMUR FRACTURE SURGERY Left 10/18/2021    FEMUR IM NAIL PB INSERTION performed by Akil Horvath DO at [address] Right 2014    HERNIA REPAIR  2011    right ing hernia    HYSTERECTOMY  age 29's    \"took both ovaries\"    TONSILLECTOMY  age 22    VERTEBROPLASTY  8/24/15     Family History   Problem Relation Age of Onset    Heart Attack Mother         Myocardial Infarction- 76    Heart Disease Mother         MI    Diabetes Mother     High Blood Pressure Mother     Diabetes Sister         Type 2    Other Brother         AAA    Heart Disease Brother         MI    Heart Disease Father         MI    Diabetes Sister     Heart Disease Brother         MI     Social History     Socioeconomic History    Marital status:      Spouse name: Not on file    Number of children: Not on file    Years of education: Not on file    Highest education level: Not on file   Occupational History    Not on file   Tobacco Use    Smoking status: Former Smoker     Packs/day: 3.00     Years: 2.00     Pack years: 6.00     Quit date: 1959     Years since quittin.3    Smokeless tobacco: Never Used   Substance and Sexual Activity    Alcohol use: No     Comment:        CAFFEINE: 2 - cups of 1/2 diet soda, 1/2 citrus & water mixture daily & tea occasionally.  Drug use: No    Sexual activity: Not on file   Other Topics Concern    Not on file   Social History Narrative    Not on file     Social Determinants of Health     Financial Resource Strain: Low Risk     Difficulty of Paying Living Expenses: Not hard at all   Food Insecurity: No Food Insecurity    Worried About Running Out of Food in the Last Year: Never true    [address] in the Last Year: Never true   Transportation Needs:     Lack of Transportation (Medical): Not on file    Lack of Transportation (Non-Medical):  Not on file   Physical Activity:     Days of Negative for sneezing, sore throat and voice change. Respiratory: Negative for cough, shortness of breath and wheezing. Cardiovascular: Negative for leg swelling. Gastrointestinal: Negative for nausea and vomiting. Musculoskeletal: Positive for arthralgias. Negative for back pain, gait problem, joint swelling, myalgias, neck pain and neck stiffness. Skin: Negative for color change, rash and wound. Neurological: Negative for weakness and numbness. Psychiatric/Behavioral: Negative for behavioral problems, confusion and self-injury. Examination:  General Exam:  Vitals: Pulse 78   Resp 17   Ht 4' 10\" (1.473 m)   Wt 125 lb (56.7 kg)   LMP  (LMP Unknown)   SpO2 98%   BMI 26.13 kg/m²    Physical Exam  Constitutional:       Appearance: Normal appearance. She is normal weight. HENT:      Head: Normocephalic and atraumatic. Nose: Nose normal.      Mouth/Throat:      Mouth: Mucous membranes are dry. Eyes:      Extraocular Movements: Extraocular movements intact. Cardiovascular:      Rate and Rhythm: Normal rate. Pulses: Normal pulses. Pulmonary:      Effort: Pulmonary effort is normal.      Breath sounds: Normal breath sounds. Musculoskeletal:         General: Tenderness present. No swelling, deformity or signs of injury. Cervical back: Normal range of motion. Lumbar back: Normal.      Right hip: Normal.      Left hip: No deformity, lacerations, tenderness, bony tenderness or crepitus. Normal range of motion. Normal strength. Right upper leg: Normal.      Left upper leg: Normal.      Right knee: Normal.      Left knee: Bony tenderness and crepitus present. No swelling, deformity, effusion or erythema. Normal range of motion. Tenderness present over the medial joint line. Normal alignment, normal meniscus and normal patellar mobility. Skin:     General: Skin is warm and dry.       Capillary Refill: Capillary refill takes less than 2 seconds. Neurological:      General: No focal deficit present. Mental Status: She is alert and oriented to person, place, and time. Mental status is at baseline. Psychiatric:         Mood and Affect: Mood normal.         Behavior: Behavior normal.          Patient demonstrates appropriate mood and affect. Left lower extremity exam:   The incisions are clean, dry, intact and nontender with no erythema. They have minimal edema, the Leg compartments are soft . There are No cords or calf tenderness. No significant calf/ankle edema. They are neurovascularly intact distally. ROM of the hip is intact. .      Diagnostic testing:  X-rays taken today demonstrate 4 views of a left hip and pelvis demonstrating previous cephalomedullary nailing. No sign of any hardware failure. No sign of any displacement of fracture from previous postoperative imaging. No sign of any new osseous abnormalities. Significant callus formation seen at fracture site, increased from prior films. Office Procedures:  No orders of the defined types were placed in this encounter. Assessment and Plan    A: 6-month status post left cephalomedullary nailing  P:   -Offered reassurance at this time the patient is doing great  -continue home exercise program  -Weightbearing as tolerated and range of motion as tolerated.  -No restrictions at this time  -Continue Eliquis as ordered by outside physician  -rest/elevation as needed  -f/u in 6 months with new x-rays at next visit. Will return in 1 month to be seen for left knee and additional cortisone injection.   -f/u sooner prn any issues          Electronically signed by Rabia Tovar DO on 4/6/2022 at 1:30 PM

## 2022-04-06 NOTE — PATIENT INSTRUCTIONS
Weightbearing and activities as tolerated  May take Ibuprofen or Motrin as needed  Rest, ice, and elevate as needed  Work on ROM and strengthening exercises as discussed  Follow up in 6 months

## 2022-04-11 ENCOUNTER — TELEMEDICINE (OUTPATIENT)
Dept: FAMILY MEDICINE CLINIC | Age: 82
End: 2022-04-11
Payer: MEDICARE

## 2022-04-11 DIAGNOSIS — R39.15 URINARY URGENCY: Primary | ICD-10-CM

## 2022-04-11 PROCEDURE — 99441 PR PHYS/QHP TELEPHONE EVALUATION 5-10 MIN: CPT | Performed by: FAMILY MEDICINE

## 2022-04-11 RX ORDER — TOLTERODINE 2 MG/1
2 CAPSULE, EXTENDED RELEASE ORAL DAILY
Qty: 90 CAPSULE | Refills: 3 | Status: SHIPPED | OUTPATIENT
Start: 2022-04-11 | End: 2022-08-31 | Stop reason: ALTCHOICE

## 2022-04-11 NOTE — PROGRESS NOTES
Ady Levine is a 80 y.o. female evaluated via telephone on 4/11/2022 for No chief complaint on file. .        Documentation:  I communicated with the patient and/or health care decision maker about     Urine incontinence: She has been taking the Detrol 4 mg. It works too well and sometimes she has to skip taking it. She said sometimes she realizes that she is not going to the bathroom at all for urine. However, overall she is very happy with the results. Details of this discussion including any medical advice provided:     Diagnosis Orders   1. Urinary urgency  tolterodine (DETROL LA) 2 MG extended release capsule     Urinary urgency much improved. Will reduce the dose of the Detrol from 4 mg to 2 mg. Refill x1 year. Total Time: minutes: 5-10 minutes    Ady Levine was evaluated through a synchronous (real-time) audio encounter. Patient identification was verified at the start of the visit. She (or guardian if applicable) is aware that this is a billable service, which includes applicable co-pays. This visit was conducted with the patient's (and/or legal guardian's) verbal consent. She has not had a related appointment within my department in the past 7 days or scheduled within the next 24 hours. The patient was located in a state where the provider was licensed to provide care.     Note: not billable if this call serves to triage the patient into an appointment for the relevant concern    Cayden Pedro MD

## 2022-05-06 PROBLEM — Z09 POSTOP CHECK: Status: RESOLVED | Noted: 2021-12-01 | Resolved: 2022-05-06

## 2022-05-25 ENCOUNTER — OFFICE VISIT (OUTPATIENT)
Dept: ORTHOPEDIC SURGERY | Age: 82
End: 2022-05-25

## 2022-05-25 VITALS
BODY MASS INDEX: 26.24 KG/M2 | WEIGHT: 125 LBS | SYSTOLIC BLOOD PRESSURE: 150 MMHG | DIASTOLIC BLOOD PRESSURE: 85 MMHG | RESPIRATION RATE: 16 BRPM | OXYGEN SATURATION: 96 % | HEIGHT: 58 IN | HEART RATE: 72 BPM

## 2022-05-25 DIAGNOSIS — M17.0 BILATERAL PRIMARY OSTEOARTHRITIS OF KNEE: Primary | ICD-10-CM

## 2022-05-25 PROCEDURE — 99213 OFFICE O/P EST LOW 20 MIN: CPT | Performed by: STUDENT IN AN ORGANIZED HEALTH CARE EDUCATION/TRAINING PROGRAM

## 2022-05-25 PROCEDURE — 1123F ACP DISCUSS/DSCN MKR DOCD: CPT | Performed by: STUDENT IN AN ORGANIZED HEALTH CARE EDUCATION/TRAINING PROGRAM

## 2022-05-25 ASSESSMENT — ENCOUNTER SYMPTOMS
SHORTNESS OF BREATH: 0
BACK PAIN: 0
VOMITING: 0
VOICE CHANGE: 0
WHEEZING: 0
NAUSEA: 0
SORE THROAT: 0
COLOR CHANGE: 0
COUGH: 0

## 2022-05-25 NOTE — PROGRESS NOTES
5/25/2022   Chief Complaint   Patient presents with    Knee Pain     L knee       Updated HPI: Patient is here for bilateral knee pain. Patient states that the left knee is significantly improved since last being seen and did well with the injection. However she is having increased right knee pain and is requesting injections today. She has no new complaints or injuries to either knee since being seen. Continues with mechanical symptoms and osteoarthritic complaints. Previous HPI (2/16/2022):                             Marshall Gillette is a 80 y.o. female well-known to myself from previous left intertrochanteric fracture and is being seen today for left knee pain. She states that she has had medial sided knee pain since the surgery. It is at the medial joint line without any mechanical symptoms. No significant prior left knee pain or injury. Patient states left hip continues to heal well and she is ambulating with a cane and states she is quite happy with her progress she is made. The pain's location is medial joint line of left knee. she describes the symptoms as aching, dull. Symptoms improve with rest. Symptoms worsen with deep knee bending, getting up from a chair, weight bearing, sitting for prolonged periods of time, twisting activities. Patient denies prior injury to knee, denies numbness, tingling, fever, chills. Denies swelling or effusions. No prominent mechanical symptoms. Denies instability. Worse with prolonged weightbearing. Better with rest.    Treatment thus far has included rest, activity modifications, oral medications with minimal relief. Here today to discuss diagnosis and treatment options. Is affecting ADLs. Pain is 3/10 at it's worst.  Currently no pain when not weightbearing    No advanced imaging thus far    Outside reports reviewed: none.     Patient's medications, allergies, past medical, surgical, social and family histories were reviewed and updated as appropriate. MA HPI: Patient is a 80year old female. Patient is in the office today with left knee pain. Pain scale  0/10 currently but increases with activity. She denies a previous injury or surgery to her left knee. She states that her left knee has been bothering her since her hip surgery on 10/18/21. Her pain in located on the medial portion of her knee and sometimes down into the anterior portion of there tibia. She states that a couple days ago her knee gave out. Occupation: retired         Medical History  Patient's medications, allergies, past medical, surgical, social and family histories were reviewed and updated as appropriate.     Past Medical History:   Diagnosis Date    Compression fracture of lumbar spine, non-traumatic ([facility] [address] 6/2013    Compression fx, thoracic spine (HCC)     T6 - scheduled for vertebroplasty 8/24/2015    Degenerative disc disease, lumbar 6/26/2013    Environmental allergies     Hx of colonic polyps 7/2006    Hypertension 2006    Osteoarthritis of lumbar spine     Osteoporosis     PONV (postoperative nausea and vomiting)     \"just with the hernia surgery\"    Spondylolisthesis of lumbar region 6/26/2013    Surgical menopause     TIA (transient ischemic attack) 1/2006    Unspecified cerebral artery occlusion with cerebral infarction     \"had light stroke 6 yrs ago-everything went black and thought I was gone but only lasted about 10 minuts\" no residual now     Past Surgical History:   Procedure Laterality Date    CHOLECYSTECTOMY  age 29's    open    COLONOSCOPY  12/2010    DILATION AND CURETTAGE OF UTERUS  age29's    FEMUR FRACTURE SURGERY Left 10/18/2021    FEMUR IM NAIL PB INSERTION performed by Brandy Matthew DO at [facility] Right 12/23/2014    HERNIA REPAIR  2011    right ing hernia    HYSTERECTOMY  age 29's    \"took both ovaries\"    TONSILLECTOMY  age 22    VERTEBROPLASTY  8/24/15     Family History   Problem Relation Age of Onset    Heart Attack Mother         Myocardial Infarction- 76    Heart Disease Mother         MI   [place]on Sicard Diabetes Mother     High Blood Pressure Mother     Diabetes Sister         Type 2    Other Brother         AAA    Heart Disease Brother         MI    Heart Disease Father         MI    Diabetes Sister     Heart Disease Brother         MI     Social History     Socioeconomic History    Marital status:      Spouse name: Not on file    Number of children: Not on file    Years of education: Not on file    Highest education level: Not on file   Occupational History    Not on file   Tobacco Use    Smoking status: Former Smoker     Packs/day: 3.00     Years: 2.00     Pack years: 6.00     Quit date: 1959     Years since quittin.5    Smokeless tobacco: Never Used   Substance and Sexual Activity    Alcohol use: No     Comment:        CAFFEINE: 2 - cups of 1/2 diet soda, 1/2 citrus & water mixture daily & tea occasionally.  Drug use: No    Sexual activity: Not on file   Other Topics Concern    Not on file   Social History Narrative    Not on file     Social Determinants of Health     Financial Resource Strain: Low Risk     Difficulty of Paying Living Expenses: Not hard at all   Food Insecurity: No Food Insecurity    Worried About Running Out of Food in the Last Year: Never true    [address] in the Last Year: Never true   Transportation Needs:     Lack of Transportation (Medical): Not on file    Lack of Transportation (Non-Medical):  Not on file   Physical Activity:     Days of Exercise per Week: Not on file    Minutes of Exercise per Session: Not on file   Stress:     Feeling of Stress : Not on file   Social Connections:     Frequency of Communication with Friends and Family: Not on file    Frequency of Social Gatherings with Friends and Family: Not on file    Attends Hinduism Services: Not on file    Active Member of Clubs or Organizations: Not on file   [place]on Sicard Attends Club or Organization Meetings: Not on file    Marital Status: Not on file   Intimate Partner Violence:     Fear of Current or Ex-Partner: Not on file    Emotionally Abused: Not on file    Physically Abused: Not on file    Sexually Abused: Not on file   Housing Stability:     Unable to Pay for Housing in the Last Year: Not on file    Number of Laure in the Last Year: Not on file    Unstable Housing in the Last Year: Not on file     Current Outpatient Medications   Medication Sig Dispense Refill    tolterodine (DETROL LA) 2 MG extended release capsule Take 1 capsule by mouth daily 90 capsule 3    alendronate (FOSAMAX) 70 MG tablet Take 1 tablet by mouth every 7 days 13 tablet 3    metoprolol succinate (TOPROL XL) 50 MG extended release tablet Take 1 tablet by mouth daily 90 tablet 1    ferrous sulfate (IRON 325) 325 (65 Fe) MG tablet Take 1 tablet by mouth daily (with breakfast) (Patient not taking: Reported on 4/11/2022) 90 tablet 0    apixaban (ELIQUIS) 5 MG TABS tablet Take 1 tablet by mouth 2 times daily 60 tablet 0    atorvastatin (LIPITOR) 10 MG tablet Take 1 tablet by mouth daily 90 tablet 1    fluticasone (FLONASE) 50 MCG/ACT nasal spray 2 sprays by Each Nostril route daily (Patient not taking: Reported on 4/11/2022) 1 Bottle 1    Ascorbic Acid (VITAMIN C) 500 MG tablet Take 1,000 mg by mouth daily      calcium-vitamin D (OSCAL-500) 500-200 MG-UNIT per tablet Take 1 tablet by mouth daily. No current facility-administered medications for this visit. No Known Allergies      Review of Systems   Constitutional: Negative for activity change, fatigue and fever. HENT: Negative for sneezing, sore throat and voice change. Respiratory: Negative for cough, shortness of breath and wheezing. Cardiovascular: Negative for leg swelling. Gastrointestinal: Negative for nausea and vomiting. Musculoskeletal: Positive for arthralgias.  Negative for back pain, gait problem, joint swelling, myalgias, neck pain and neck stiffness. Skin: Negative for color change, rash and wound. Neurological: Negative for weakness and numbness. Psychiatric/Behavioral: Negative for behavioral problems, confusion and self-injury. Examination:  General Exam:  Vitals: LMP  (LMP Unknown)    Physical Exam  Constitutional:       General: She is not in acute distress. Appearance: Normal appearance. She is normal weight. HENT:      Head: Normocephalic and atraumatic. Eyes:      General:         Right eye: No discharge. Left eye: No discharge. Extraocular Movements: Extraocular movements intact. Cardiovascular:      Pulses: Normal pulses. Pulmonary:      Effort: Pulmonary effort is normal.      Breath sounds: Normal breath sounds. Musculoskeletal:         General: Tenderness present. No swelling, deformity or signs of injury. Cervical back: Normal range of motion. Right hip: Normal.      Left hip: Normal.      Right upper leg: Normal.      Left upper leg: Normal.      Right knee: Bony tenderness and crepitus present. No swelling, deformity, effusion, erythema, ecchymosis or lacerations. Normal range of motion. Tenderness present over the medial joint line. No LCL laxity, MCL laxity, ACL laxity or PCL laxity. Normal alignment, normal meniscus and normal patellar mobility. Normal pulse. Instability Tests: Anterior drawer test negative. Posterior drawer test negative. Anterior Lachman test negative. Medial Germain test negative and lateral Germain test negative. Left knee: Bony tenderness present. No swelling, deformity, effusion, erythema, ecchymosis, lacerations or crepitus. Normal range of motion. Tenderness present over the medial joint line. No lateral joint line tenderness. No LCL laxity, MCL laxity, ACL laxity or PCL laxity. Normal alignment, normal meniscus and normal patellar mobility. Normal pulse. Instability Tests: Anterior drawer test negative. Posterior drawer test negative. Medial Germain test negative and lateral Germain test negative. Right lower leg: Normal. No edema. Left lower leg: Normal. No edema. Right ankle: Normal.      Left ankle: Normal.      Right foot: Normal.      Left foot: Normal.   Skin:     General: Skin is warm and dry. Capillary Refill: Capillary refill takes less than 2 seconds. Neurological:      General: No focal deficit present. Mental Status: She is alert and oriented to person, place, and time. Mental status is at baseline. Gait: Gait normal.   Psychiatric:         Mood and Affect: Mood normal.         Behavior: Behavior normal.        RIGHT KNEE EXAMINATION       OBSERVATION / INSPECTION     Gait: Minimally antalgic    Alignment: Neutral     Scars: None     Muscle atrophy: Minimal    Effusion: None     Warmth: None     Discoloration: none       TENDERNESS / CREPITUS (T / C): Patella - / -     Lateral joint line - / -  Medial joint line  + / -     Peripatellar lateral - / -  Peripatellar medial  - / -     Medial plica - / -  Lateral plica - / -    Patellar tendon - / -   Prepatellar Bursa - / -     Popliteal fossa - / -    Gastrocnemius - / -    Quadricep - / -   Quad tendon - / -      Tibial tubercle - / -     Thigh/hamstring - / -  Pes anserine/HS - / -     ITB - / -     Tibia - / -   Fibula - / -    Tib/fib joint - / -     MFC - / -    LFC - / -     MCL: Proximal - / -  Distal - / -    LCL - / -    MCL - / -      ROM: (* = pain)  PASSIVE  ACTIVE     Left :    0 / 145  0 / 145      Right :    0 / 145  0 / 145      PATELLOFEMORAL EXAMINATION:    See above noted areas of tenderness.      Patella position     Subluxation / dislocation: Centered     Sup. / Inf; Normal     Crepitus (PF): Absent     Patellar Mobility:     Medial-lateral: Normal     Superior-inferior: Normal     Inferior tilt Normal     Patellar tendon: Normal     Lateral tilt: Normal     J-sign: None     Patellofemoral grind: No pain         MENISCAL SIGNS:     Pain on terminal extension: -    Pain on terminal flexion: -      LIGAMENT EXAMINATION:    ACL / Lachman: normal (-1 to 2mm)      PCL-Post.  drawer: normal 0 to 2mm    MCL- Valgus: normal 0 to 2mm    LCL- Varus:  normal 0 to 2mm      STRENGTH: (* = with pain) PAINFUL SIDE    Quadricep 5/5    Hamstrin/5      EXTREMITY NEURO-VASCULAR EXAMINATION:     Sensation:  Grossly intact to light touch all dermatomal regions. Motor Function:  Fully intact motor function at hip, knee, foot and ankle      DTRs;  quadriceps and  achilles 2+. No clonus and downgoing Babinski. Vascular status:  DP and PT pulses 2+, brisk capillary refill, symmetric. Diagnostic testing:  X-ray images were reviewed by myself and discussed with the patient:  4 views of left knee in a skeletally mature patient demonstrates no acute osseous abnormalities. Patient has moderate osteophytic changes most prominent at the medial compartment and patellofemoral compartment with no significant osteophyte formation. Generalized osteopenia throughout the knee. Alignment is neutral.    Office Procedures:  No orders of the defined types were placed in this encounter. Right lateral Infrapatetellar Injection Procedure Note   Pre-operative Diagnosis: Right knee osteoarthritis   Post-operative Diagnosis: same   Indications: Symptomatic relief of osteoarthritis   Anesthesia: Lidocaine 1% and marcaine 0.5% without epinephrine without added sodium bicarbonate   Procedure Details   Verbal consent was obtained for the procedure. The knee was prepped with alcohol. A 22 gauge needle was advanced into the lateral joint space through lateral infrapatellar approach without difficulty The space was then injected with 1 ml 1% lidocaine and 1 ml 0.5% marcaine and 1 ml of triamcinolone (KENALOG) 40mg/ml.  The injection site was cleansed with isopropyl alcohol and a dressing was applied. Complications: None; patient tolerated the procedure well. Symptoms were improved immediately after the injection. Assessment and Plan    A: Bilateral knee osteoarthritis    P:     I had a thorough conversation with the patient regarding her bilateral knee pain and treatment course. I explained because the left knee is doing so well and is essentially asymptomatic at this time, I would not recommend another injection but we can do one at any point the pain returns. However, I would recommend a cortisone injection of the right knee she does show moderate osteoarthritic changes and did so well with the previous cortisone injection to the left knee without any complications. Patient agreed and the right knee cortisone injection was performed out complication and she had immediate pain relief. She will continue doing the home exercise program for both knees and she will continue using ice and over-the-counter anti-inflammatory medications for pain control. She can continue being weightbearing and range of motion as tolerated. She will follow-up in 3 months for reevaluation of bilateral knees but again can call and return sooner in regards to the left knee if she would like a cortisone injection earlier than the follow-up appointment in 3 months. All questions were answered and patient voiced understanding.       Electronically signed by Desi Heath DO on 5/25/2022 at 8:19 AM

## 2022-05-25 NOTE — PROGRESS NOTES
Pt returns to the office today for a follow up of her L knee. Pt states her left knee is doing great and states her pain is 0/10. Pt requested to be seen for her R knee today instead. Pt states her pain in her R knee started about 2-3 weeks ago with no report of injury. Pt states she thinks her pain started due to compensating when walking for her L knee over the past few months. Pt states her pain increases when walking and rates her pain 8/10 at the worst. Pt denies any history of surgery or injections.

## 2022-05-25 NOTE — PATIENT INSTRUCTIONS
Continue weight-bearing as tolerated. Continue range of motion exercises as instructed. Ice and elevate as needed. Tylenol or Motrin for pain. Follow up in 3 months.

## 2022-08-24 ENCOUNTER — OFFICE VISIT (OUTPATIENT)
Dept: ORTHOPEDIC SURGERY | Age: 82
End: 2022-08-24
Payer: MEDICARE

## 2022-08-24 VITALS — SYSTOLIC BLOOD PRESSURE: 142 MMHG | OXYGEN SATURATION: 95 % | DIASTOLIC BLOOD PRESSURE: 88 MMHG | HEART RATE: 77 BPM

## 2022-08-24 DIAGNOSIS — M17.0 BILATERAL PRIMARY OSTEOARTHRITIS OF KNEE: Primary | ICD-10-CM

## 2022-08-24 DIAGNOSIS — Z87.81 S/P LEFT HIP FRACTURE: ICD-10-CM

## 2022-08-24 PROCEDURE — 20610 DRAIN/INJ JOINT/BURSA W/O US: CPT | Performed by: STUDENT IN AN ORGANIZED HEALTH CARE EDUCATION/TRAINING PROGRAM

## 2022-08-24 ASSESSMENT — ENCOUNTER SYMPTOMS
COUGH: 0
VOICE CHANGE: 0
COLOR CHANGE: 0
VOMITING: 0
WHEEZING: 0
SORE THROAT: 0
SHORTNESS OF BREATH: 0
NAUSEA: 0
BACK PAIN: 0

## 2022-08-24 NOTE — PROGRESS NOTES
8/24/2022   Chief Complaint   Patient presents with    Knee Pain     Patient is here for injection for right knee. Updated HPI: Patient is here for reevaluation of bilateral knees. She states that her left knee pain has essentially resolved but she is having right knee pain at this time that is slightly worse in the last several weeks. She states the cortisone injection has provided excellent relief and she is requesting another one today. She has been able to ambulate without issue and she has no specific groin pain the left side but does have pain over the lateral hip at the cephalomedullary screw incision. No new injuries or complaint since last being seen. No other orthopedic issues. Previous HPI (5/25/2022): Patient is here for bilateral knee pain. Patient states that the left knee is significantly improved since last being seen and did well with the injection. However she is having increased right knee pain and is requesting injections today. She has no new complaints or injuries to either knee since being seen. Continues with mechanical symptoms and osteoarthritic complaints. Previous HPI (2/16/2022):                             Jimmy Noel is a 80 y.o. female well-known to myself from previous left intertrochanteric fracture and is being seen today for left knee pain. She states that she has had medial sided knee pain since the surgery. It is at the medial joint line without any mechanical symptoms. No significant prior left knee pain or injury. Patient states left hip continues to heal well and she is ambulating with a cane and states she is quite happy with her progress she is made. The pain's location is medial joint line of left knee. she describes the symptoms as aching, dull. Symptoms improve with rest. Symptoms worsen with deep knee bending, getting up from a chair, weight bearing, sitting for prolonged periods of time, twisting activities.      Patient denies prior injury to knee, denies numbness, tingling, fever, chills. Denies swelling or effusions. No prominent mechanical symptoms. Denies instability. Worse with prolonged weightbearing. Better with rest.    Treatment thus far has included rest, activity modifications, oral medications with minimal relief. Here today to discuss diagnosis and treatment options. Is affecting ADLs. Pain is 3/10 at it's worst.  Currently no pain when not weightbearing    No advanced imaging thus far    Outside reports reviewed: none. Patient's medications, allergies, past medical, surgical, social and family histories were reviewed and updated as appropriate. MA HPI: Patient is a 80year old female. Patient is in the office today with left knee pain. Pain scale  0/10 currently but increases with activity. She denies a previous injury or surgery to her left knee. She states that her left knee has been bothering her since her hip surgery on 10/18/21. Her pain in located on the medial portion of her knee and sometimes down into the anterior portion of there tibia. She states that a couple days ago her knee gave out. Occupation: retired         Medical History  Patient's medications, allergies, past medical, surgical, social and family histories were reviewed and updated as appropriate.     Past Medical History:   Diagnosis Date    Compression fracture of lumbar spine, non-traumatic ([address] 6/2013    Compression fx, thoracic spine ([address]     T6 - scheduled for vertebroplasty 8/24/2015    Degenerative disc disease, lumbar 6/26/2013    Environmental allergies     Hx of colonic polyps 7/2006    Hypertension 2006    Osteoarthritis of lumbar spine     Osteoporosis     PONV (postoperative nausea and vomiting)     \"just with the hernia surgery\"    Spondylolisthesis of lumbar region 6/26/2013    Surgical menopause     TIA (transient ischemic attack) 1/2006    Unspecified cerebral artery occlusion with cerebral infarction     \"had light stroke 6 yrs ago-everything went black and thought I was gone but only lasted about 10 minuts\" no residual now     Past Surgical History:   Procedure Laterality Date    CHOLECYSTECTOMY  age 29's    open    COLONOSCOPY  2010    DILATION AND CURETTAGE OF UTERUS  age31's    FEMUR FRACTURE SURGERY Left 10/18/2021    FEMUR IM NAIL PB INSERTION performed by William Peguero DO at [place] [address]      right ing hernia    HYSTERECTOMY  age 29's    \"took both ovaries\"    TONSILLECTOMY  age 22    TRIGGER FINGER RELEASE Right 2014    VERTEBROPLASTY  8/24/15     Family History   Problem Relation Age of Onset    Heart Attack Mother         Myocardial Infarction- 76    Heart Disease Mother         MI    Diabetes Mother     High Blood Pressure Mother     Diabetes Sister         Type 2    Other Brother         AAA    Heart Disease Brother         MI    Heart Disease Father         MI    Diabetes Sister     Heart Disease Brother         MI     Social History     Socioeconomic History    Marital status:    Tobacco Use    Smoking status: Former     Packs/day: 3.00     Years: 2.00     Pack years: 6.00     Types: Cigarettes     Quit date: 1959     Years since quittin.7    Smokeless tobacco: Never   Substance and Sexual Activity    Alcohol use: No     Comment:        CAFFEINE: 2 - cups of 1/2 diet soda, 1/2 citrus & water mixture daily & tea occasionally.     Drug use: No     Social Determinants of Health     Financial Resource Strain: Low Risk     Difficulty of Paying Living Expenses: Not hard at all   Food Insecurity: No Food Insecurity    Worried About [facility] in the Last Year: Never true    Ran Out of Food in the Last Year: Never true     Current Outpatient Medications   Medication Sig Dispense Refill    tolterodine (DETROL LA) 2 MG extended release capsule Take 1 capsule by mouth daily 90 capsule 3    alendronate (FOSAMAX) 70 MG tablet Take 1 tablet by mouth every 7 days (Patient not taking: Reported on 5/25/2022) 13 tablet 3    metoprolol succinate (TOPROL XL) 50 MG extended release tablet Take 1 tablet by mouth daily 90 tablet 1    ferrous sulfate (IRON 325) 325 (65 Fe) MG tablet Take 1 tablet by mouth daily (with breakfast) (Patient not taking: Reported on 4/11/2022) 90 tablet 0    apixaban (ELIQUIS) 5 MG TABS tablet Take 1 tablet by mouth 2 times daily 60 tablet 0    atorvastatin (LIPITOR) 10 MG tablet Take 1 tablet by mouth daily 90 tablet 1    fluticasone (FLONASE) 50 MCG/ACT nasal spray 2 sprays by Each Nostril route daily 1 Bottle 1    Ascorbic Acid (VITAMIN C) 500 MG tablet Take 1,000 mg by mouth daily      calcium-vitamin D (OSCAL-500) 500-200 MG-UNIT per tablet Take 1 tablet by mouth daily. No current facility-administered medications for this visit. No Known Allergies      Review of Systems   Constitutional:  Negative for activity change, fatigue and fever. HENT:  Negative for sneezing, sore throat and voice change. Respiratory:  Negative for cough, shortness of breath and wheezing. Cardiovascular:  Negative for leg swelling. Gastrointestinal:  Negative for nausea and vomiting. Musculoskeletal:  Positive for arthralgias. Negative for back pain, gait problem, joint swelling, myalgias, neck pain and neck stiffness. Skin:  Negative for color change, rash and wound. Neurological:  Negative for weakness and numbness. Psychiatric/Behavioral:  Negative for behavioral problems, confusion and self-injury. Examination:  General Exam:  Vitals: BP (!) 142/88 (Site: Right Wrist, Position: Sitting)   Pulse 77   LMP  (LMP Unknown)   SpO2 95%    Physical Exam  Constitutional:       General: She is not in acute distress. Appearance: Normal appearance. She is normal weight. HENT:      Head: Normocephalic and atraumatic. Eyes:      General:         Right eye: No discharge.          Left eye: No discharge. Extraocular Movements: Extraocular movements intact. Cardiovascular:      Pulses: Normal pulses. Pulmonary:      Effort: Pulmonary effort is normal.      Breath sounds: Normal breath sounds. Musculoskeletal:         General: Tenderness present. No swelling, deformity or signs of injury. Cervical back: Normal range of motion. Right hip: Normal.      Left hip: Normal.      Right upper leg: Normal.      Left upper leg: Normal.      Right knee: Bony tenderness and crepitus present. No swelling, deformity, effusion, erythema, ecchymosis or lacerations. Normal range of motion. Tenderness present over the medial joint line. No LCL laxity, MCL laxity, ACL laxity or PCL laxity. Normal alignment, normal meniscus and normal patellar mobility. Normal pulse. Instability Tests: Anterior drawer test negative. Posterior drawer test negative. Anterior Lachman test negative. Medial Germain test negative and lateral Germain test negative. Left knee: Bony tenderness present. No swelling, deformity, effusion, erythema, ecchymosis, lacerations or crepitus. Normal range of motion. Tenderness present over the medial joint line. No lateral joint line tenderness. No LCL laxity, MCL laxity, ACL laxity or PCL laxity. Normal alignment, normal meniscus and normal patellar mobility. Normal pulse. Instability Tests: Anterior drawer test negative. Posterior drawer test negative. Medial Germain test negative and lateral Germain test negative. Right lower leg: Normal. No edema. Left lower leg: Normal. No edema. Right ankle: Normal.      Left ankle: Normal.      Right foot: Normal.      Left foot: Normal.   Skin:     General: Skin is warm and dry. Capillary Refill: Capillary refill takes less than 2 seconds. Neurological:      General: No focal deficit present. Mental Status: She is alert and oriented to person, place, and time. Mental status is at baseline.       Gait: Gait normal.   Psychiatric:         Mood and Affect: Mood normal.         Behavior: Behavior normal.      RIGHT KNEE EXAMINATION       OBSERVATION / INSPECTION     Gait: Nonantalgic    Alignment: Neutral     Scars: None     Muscle atrophy: Minimal    Effusion: None     Warmth: None     Discoloration: none       TENDERNESS / CREPITUS (T / C): Patella - / -     Lateral joint line - / -  Medial joint line  + / -     Peripatellar lateral - / -  Peripatellar medial  - / -     Medial plica - / -  Lateral plica - / -    Patellar tendon - / -   Prepatellar Bursa - / -     Popliteal fossa - / -    Gastrocnemius - / -    Quadricep - / -   Quad tendon - / -      Tibial tubercle - / -     Thigh/hamstring - / -  Pes anserine/HS - / -     ITB - / -     Tibia - / -   Fibula - / -    Tib/fib joint - / -     MFC - / -    LFC - / -     MCL: Proximal - / -  Distal - / -    LCL - / -    MCL - / -      ROM: (* = pain)  PASSIVE  ACTIVE     Left :    0 / 145  0 / 145      Right :    0 / 145  0 / 145      PATELLOFEMORAL EXAMINATION:    See above noted areas of tenderness. Patella position     Subluxation / dislocation: Centered     Sup. / Inf; Normal     Crepitus (PF): Absent     Patellar Mobility:     Medial-lateral: Normal     Superior-inferior: Normal     Inferior tilt Normal     Patellar tendon: Normal     Lateral tilt: Normal     J-sign: None     Patellofemoral grind: No pain         MENISCAL SIGNS:     Pain on terminal extension: -    Pain on terminal flexion: -      LIGAMENT EXAMINATION:    ACL / Lachman: normal (-1 to 2mm)      PCL-Post.  drawer: normal 0 to 2mm    MCL- Valgus: normal 0 to 2mm    LCL- Varus:  normal 0 to 2mm      STRENGTH: (* = with pain) PAINFUL SIDE    Quadricep 5/5    Hamstrin/5      EXTREMITY NEURO-VASCULAR EXAMINATION:     Sensation:  Grossly intact to light touch all dermatomal regions.      Motor Function:  Fully intact motor function at hip, knee, foot and ankle      DTRs;  quadriceps and achilles 2+. No clonus and downgoing Babinski. Vascular status:  DP and PT pulses 2+, brisk capillary refill, symmetric. Left hip demonstrates no pain with passive range of motion. No limp when ambulating. Previous incisions are well-healed. I am not able to palpate the underlying screw at the lateral hip. No pain with logroll. Diagnostic testing:  X-ray images were reviewed by myself and discussed with the patient:  4 views of the left hip in a skeletally mature patient demonstrates previous cephalomedullary nailing that is well-healed. Significant callus formation and bone formation of previous fracture sites is seen. Hardware remains well-positioned without any displacement. No sign of any acute osseous or soft tissue abnormalities including additional fracture or soft tissue avulsion type injury. No intra-articular penetration of hardware. Previous Imagin views of left knee in a skeletally mature patient demonstrates no acute osseous abnormalities. Patient has moderate osteophytic changes most prominent at the medial compartment and patellofemoral compartment with no significant osteophyte formation. Generalized osteopenia throughout the knee. Alignment is neutral.    Office Procedures:  No orders of the defined types were placed in this encounter. Right lateral Infrapatetellar Injection Procedure Note   Pre-operative Diagnosis: Right knee osteoarthritis   Post-operative Diagnosis: same   Indications: Symptomatic relief of osteoarthritis   Anesthesia: Lidocaine 1% and marcaine 0.5% without epinephrine without added sodium bicarbonate   Procedure Details   Verbal consent was obtained for the procedure. The knee was prepped with alcohol. A 22 gauge needle was advanced into the lateral joint space through lateral infrapatellar approach without difficulty The space was then injected with 1 ml 1% lidocaine and 1 ml 0.5% marcaine and 1 ml of triamcinolone (KENALOG) 40mg/ml.  The injection site was cleansed with isopropyl alcohol and a dressing was applied. Complications: None; patient tolerated the procedure well. Symptoms were improved immediately after the injection. Assessment and Plan    A: Bilateral knee osteoarthritis    P:   I had a thorough conversation with the patient regarding her knee issues and hip imaging. Offered reassurance that her hip has healed well and there were no concerning findings there. I explained that some of the pain that she is having the lateral hip is likely to the screw being somewhat prominent but this is very common and she should use ice and anti-inflammatories for pain control for this. I also offered an injection of the right knee today as she had great success with previous injections and she agreed. This was performed without complication and patient had immediate pain relief. She will follow-up in 3 months for reevaluation of her knees. She will call if there is any new or worsening issues and we can move up her appointment. She can remain weightbearing and range of motion as tolerated. All questions were answered and patient voices understanding.     Electronically signed by Zoraida Armijo DO on 8/24/2022 at 2:16 PM

## 2022-08-24 NOTE — PROGRESS NOTES
Patient is here for R Knee injection, last injection 5/25/22. Patient states last injection was effective. Patient denies new injury. Patient denies numbness and tingling in right leg. Patient also has questions about swelling on left hip incision from nail jazzy insertion from Dr Jamaal Santos 10/18/21. Previous injection note    Right lateral Infrapatetellar Injection Procedure Note   Pre-operative Diagnosis: Right knee osteoarthritis   Post-operative Diagnosis: same   Indications: Symptomatic relief of osteoarthritis   Anesthesia: Lidocaine 1% and marcaine 0.5% without epinephrine without added sodium bicarbonate   Procedure Details   Verbal consent was obtained for the procedure. The knee was prepped with alcohol. A 22 gauge needle was advanced into the lateral joint space through lateral infrapatellar approach without difficulty The space was then injected with 1 ml 1% lidocaine and 1 ml 0.5% marcaine and 1 ml of triamcinolone (KENALOG) 40mg/ml. The injection site was cleansed with isopropyl alcohol and a dressing was applied. Complications: None; patient tolerated the procedure well. Symptoms were improved immediately after the injection.

## 2022-08-31 ENCOUNTER — OFFICE VISIT (OUTPATIENT)
Dept: FAMILY MEDICINE CLINIC | Age: 82
End: 2022-08-31
Payer: MEDICARE

## 2022-08-31 VITALS
BODY MASS INDEX: 26.03 KG/M2 | HEIGHT: 58 IN | WEIGHT: 124 LBS | DIASTOLIC BLOOD PRESSURE: 70 MMHG | OXYGEN SATURATION: 95 % | HEART RATE: 78 BPM | SYSTOLIC BLOOD PRESSURE: 142 MMHG

## 2022-08-31 DIAGNOSIS — G25.0 ESSENTIAL TREMOR: ICD-10-CM

## 2022-08-31 DIAGNOSIS — R55 NEAR SYNCOPE: Primary | ICD-10-CM

## 2022-08-31 DIAGNOSIS — N39.3 STRESS INCONTINENCE OF URINE: ICD-10-CM

## 2022-08-31 DIAGNOSIS — I10 ESSENTIAL HYPERTENSION: ICD-10-CM

## 2022-08-31 DIAGNOSIS — I47.1 SVT (SUPRAVENTRICULAR TACHYCARDIA) (HCC): ICD-10-CM

## 2022-08-31 DIAGNOSIS — M81.0 AGE-RELATED OSTEOPOROSIS WITHOUT CURRENT PATHOLOGICAL FRACTURE: ICD-10-CM

## 2022-08-31 LAB
BASOPHILS ABSOLUTE: 0 K/UL (ref 0–0.2)
BASOPHILS RELATIVE PERCENT: 0.5 %
EOSINOPHILS ABSOLUTE: 0.1 K/UL (ref 0–0.6)
EOSINOPHILS RELATIVE PERCENT: 1.3 %
HCT VFR BLD CALC: 41.6 % (ref 36–48)
HEMOGLOBIN: 14.3 G/DL (ref 12–16)
LYMPHOCYTES ABSOLUTE: 1.3 K/UL (ref 1–5.1)
LYMPHOCYTES RELATIVE PERCENT: 16.3 %
MCH RBC QN AUTO: 33.1 PG (ref 26–34)
MCHC RBC AUTO-ENTMCNC: 34.3 G/DL (ref 31–36)
MCV RBC AUTO: 96.6 FL (ref 80–100)
MONOCYTES ABSOLUTE: 0.5 K/UL (ref 0–1.3)
MONOCYTES RELATIVE PERCENT: 6.2 %
NEUTROPHILS ABSOLUTE: 6 K/UL (ref 1.7–7.7)
NEUTROPHILS RELATIVE PERCENT: 75.7 %
PDW BLD-RTO: 13.2 % (ref 12.4–15.4)
PLATELET # BLD: 188 K/UL (ref 135–450)
PMV BLD AUTO: 8.9 FL (ref 5–10.5)
RBC # BLD: 4.31 M/UL (ref 4–5.2)
WBC # BLD: 7.9 K/UL (ref 4–11)

## 2022-08-31 PROCEDURE — 93000 ELECTROCARDIOGRAM COMPLETE: CPT | Performed by: FAMILY MEDICINE

## 2022-08-31 PROCEDURE — 1123F ACP DISCUSS/DSCN MKR DOCD: CPT | Performed by: FAMILY MEDICINE

## 2022-08-31 PROCEDURE — 36415 COLL VENOUS BLD VENIPUNCTURE: CPT | Performed by: FAMILY MEDICINE

## 2022-08-31 PROCEDURE — 99214 OFFICE O/P EST MOD 30 MIN: CPT | Performed by: FAMILY MEDICINE

## 2022-08-31 RX ORDER — SOLIFENACIN SUCCINATE 5 MG/1
5 TABLET, FILM COATED ORAL DAILY
Qty: 90 TABLET | Refills: 3 | Status: SHIPPED | OUTPATIENT
Start: 2022-08-31 | End: 2023-08-31

## 2022-08-31 RX ORDER — METOPROLOL SUCCINATE 50 MG/1
50 TABLET, EXTENDED RELEASE ORAL DAILY
Qty: 90 TABLET | Refills: 1 | Status: SHIPPED | OUTPATIENT
Start: 2022-08-31

## 2022-08-31 ASSESSMENT — PATIENT HEALTH QUESTIONNAIRE - PHQ9
2. FEELING DOWN, DEPRESSED OR HOPELESS: 0
SUM OF ALL RESPONSES TO PHQ QUESTIONS 1-9: 0
1. LITTLE INTEREST OR PLEASURE IN DOING THINGS: 0
SUM OF ALL RESPONSES TO PHQ9 QUESTIONS 1 & 2: 0
SUM OF ALL RESPONSES TO PHQ QUESTIONS 1-9: 0

## 2022-08-31 ASSESSMENT — ENCOUNTER SYMPTOMS: SHORTNESS OF BREATH: 0

## 2022-08-31 NOTE — PROGRESS NOTES
Patient ID: Tai Ontiveros [id number]    . Chief Complaint   Patient presents with    Hyperlipidemia    Hypertension    Other     Saturday evening felt weak, nausea, everything turned black she said she thinks her blood pressure spiked. Hyperlipidemia  This is a chronic problem. The current episode started more than 1 year ago. Pertinent negatives include no shortness of breath. Current antihyperlipidemic treatment includes statins. Hypertension  This is a chronic problem. The current episode started more than 1 year ago. The problem is unchanged. The problem is controlled. Pertinent negatives include no palpitations or shortness of breath. Risk factors for coronary artery disease include post-menopausal state. Past treatments include beta blockers and diuretics. Urinary Frequency   This is a new problem. The current episode started more than 1 month ago. The problem occurs intermittently (aggravated by coughing or sneezing). The problem has been unchanged. Associated symptoms include frequency and urgency. Treatments tried: kegal exercisies. detrol helps. Near syncope: when stepped through doour into her living room, everything went black. Was able to verbalize to her  for help and he got her to the cough. Didn't pass out. Thinks her BP spiked but there was no measurement done. No heart palpitations. Eating and drinking OK that day. Then had small amount of diarrhea. No incontinence. Now weak and loss of appetite. Review of Systems   Respiratory:  Negative for shortness of breath. Cardiovascular:  Negative for palpitations. Genitourinary:  Positive for frequency and urgency.      Patient Active Problem List   Diagnosis    Essential hypertension    Osteoporosis    Hx of colonic polyps    Degenerative disc disease, lumbar    Spondylolisthesis of lumbar region    Osteoarthritis of cervical spine    SVT (supraventricular tachycardia) (HCC)    Chronic left-sided low back pain with left-sided sciatica    Nonrheumatic aortic valve insufficiency    Mitral regurgitation    History of compression fracture of spine    Anticoagulated    Paroxysmal atrial fibrillation (HCC)    Nondisplaced intertrochanteric fracture of left femur, subsequent encounter for closed fracture with routine healing    Bilateral primary osteoarthritis of knee    S/p left hip fracture       Past Surgical History:   Procedure Laterality Date    CHOLECYSTECTOMY  age 29's    open    COLONOSCOPY  2010    DILATION AND CURETTAGE OF UTERUS  age29's    FEMUR FRACTURE SURGERY Left 10/18/2021    FEMUR IM NAIL PB INSERTION performed by Gabe Vu DO at [address]      right ing hernia    HYSTERECTOMY  age 29's    \"took both ovaries\"    TONSILLECTOMY  age 22    [address] Right 2014    VERTEBROPLASTY  8/24/15       Family History   Problem Relation Age of Onset    Heart Attack Mother         Myocardial Infarction- 76    Heart Disease Mother         MI    Diabetes Mother     High Blood Pressure Mother     Diabetes Sister         Type 2    Other Brother         AAA    Heart Disease Brother         MI    Heart Disease Father         MI    Diabetes Sister     Heart Disease Brother         MI       Current Outpatient Medications on File Prior to Visit   Medication Sig Dispense Refill    alendronate (FOSAMAX) 70 MG tablet Take 1 tablet by mouth every 7 days 13 tablet 3    apixaban (ELIQUIS) 5 MG TABS tablet Take 1 tablet by mouth 2 times daily 60 tablet 0    atorvastatin (LIPITOR) 10 MG tablet Take 1 tablet by mouth daily 90 tablet 1    Ascorbic Acid (VITAMIN C) 500 MG tablet Take 1,000 mg by mouth daily      calcium-vitamin D (OSCAL-500) 500-200 MG-UNIT per tablet Take 1 tablet by mouth daily. fluticasone (FLONASE) 50 MCG/ACT nasal spray 2 sprays by Each Nostril route daily 1 Bottle 1     No current facility-administered medications on file prior to visit. Objective:         Physical Exam  Vitals and nursing note reviewed. Constitutional:       General: She is not in acute distress. Appearance: She is well-developed. HENT:      Head: Normocephalic and atraumatic. Right Ear: Hearing, tympanic membrane and external ear normal.      Left Ear: Hearing, tympanic membrane and external ear normal.      Nose: Nose normal. No nasal deformity, laceration, mucosal edema or rhinorrhea. Right Sinus: No maxillary sinus tenderness or frontal sinus tenderness. Left Sinus: No maxillary sinus tenderness or frontal sinus tenderness. Mouth/Throat:      Mouth: Mucous membranes are moist.      Pharynx: No oropharyngeal exudate or posterior oropharyngeal erythema. Eyes:      Conjunctiva/sclera: Conjunctivae normal.   Neck:      Thyroid: No thyromegaly. Trachea: No tracheal deviation. Cardiovascular:      Rate and Rhythm: Normal rate and regular rhythm. Heart sounds: Normal heart sounds, S1 normal and S2 normal.     No friction rub. No gallop. Comments: No carotid bruit  Pulmonary:      Effort: No respiratory distress. Breath sounds: No wheezing or rales. Musculoskeletal:      Right lower leg: No edema. Left lower leg: No edema. Lymphadenopathy:      Head:      Right side of head: No submental, submandibular or posterior auricular adenopathy. Left side of head: No submental, submandibular or posterior auricular adenopathy. Cervical:      Right cervical: No superficial, deep or posterior cervical adenopathy. Left cervical: No superficial, deep or posterior cervical adenopathy. Skin:     General: Skin is warm and dry.    Psychiatric:         Behavior: Behavior normal.     Vitals:    08/31/22 1043 08/31/22 1057   BP: (!) 160/70 (!) 142/70   Site: Right Upper Arm    Position: Sitting    Cuff Size: Medium Adult    Pulse: 78    SpO2: 95%    Weight: 124 lb (56.2 kg)    Height: 4' 10\" (1.473 m)      Body mass index is 25.92 kg/m². Wt Readings from Last 3 Encounters:   08/31/22 124 lb (56.2 kg)   05/25/22 125 lb (56.7 kg)   04/06/22 125 lb (56.7 kg)     BP Readings from Last 3 Encounters:   08/31/22 (!) 142/70   08/24/22 (!) 142/88   05/25/22 (!) 150/85          No results found for this visit on 08/31/22. The ASCVD Risk score (Keshav Murdock, et al., 2013) failed to calculate for the following reasons: The 2013 ASCVD risk score is only valid for ages 36 to 78  Lab Review   No visits with results within 2 Month(s) from this visit. Latest known visit with results is:   Office Visit on 03/09/2022   Component Date Value    Color, UA 03/09/2022 yellow     Clarity, UA 03/09/2022 clear     Glucose, UA POC 03/09/2022 Neg     Bilirubin, UA 03/09/2022 Neg     Ketones, UA 03/09/2022 Neg     Spec Grav, UA 03/09/2022 1.025     Blood, UA POC 03/09/2022 Trace     pH, UA 03/09/2022 6.5     Protein, UA POC 03/09/2022 neg     Urobilinogen, UA 03/09/2022 0.2     Leukocytes, UA 03/09/2022 Neg     Nitrite, UA 03/09/2022 Neg            Assessment:       Diagnosis Orders   1. Near syncope  EKG 12 lead    CBC with Auto Differential    Comprehensive Metabolic Panel    TSH with Reflex      2. Essential hypertension  TSH with Reflex    metoprolol succinate (TOPROL XL) 50 MG extended release tablet      3. Age-related osteoporosis without current pathological fracture  Vitamin D 25 Hydroxy      4. Stress incontinence of urine  solifenacin (VESICARE) 5 MG tablet      5. SVT (supraventricular tachycardia) (HCC)  metoprolol succinate (TOPROL XL) 50 MG extended release tablet      6. Essential tremor  metoprolol succinate (TOPROL XL) 50 MG extended release tablet              Plan:      Since Detrol is anticholinergic, will switch to Vesicare. Explained that this is safer for her    Hypertension with blood pressure slightly elevated today. Continue the Toprol 50 mg for now.   Patient is 80years old and may be okay with keeping the blood

## 2022-09-01 LAB
A/G RATIO: 2.4 (ref 1.1–2.2)
ALBUMIN SERPL-MCNC: 4.3 G/DL (ref 3.4–5)
ALP BLD-CCNC: 67 U/L (ref 40–129)
ALT SERPL-CCNC: 12 U/L (ref 10–40)
ANION GAP SERPL CALCULATED.3IONS-SCNC: 7 MMOL/L (ref 3–16)
AST SERPL-CCNC: 11 U/L (ref 15–37)
BILIRUB SERPL-MCNC: 0.7 MG/DL (ref 0–1)
BUN BLDV-MCNC: 18 MG/DL (ref 7–20)
CALCIUM SERPL-MCNC: 9 MG/DL (ref 8.3–10.6)
CHLORIDE BLD-SCNC: 106 MMOL/L (ref 99–110)
CO2: 28 MMOL/L (ref 21–32)
CREAT SERPL-MCNC: 0.7 MG/DL (ref 0.6–1.2)
GFR AFRICAN AMERICAN: >60
GFR NON-AFRICAN AMERICAN: >60
GLUCOSE BLD-MCNC: 104 MG/DL (ref 70–99)
POTASSIUM SERPL-SCNC: 4.8 MMOL/L (ref 3.5–5.1)
SODIUM BLD-SCNC: 141 MMOL/L (ref 136–145)
TOTAL PROTEIN: 6.1 G/DL (ref 6.4–8.2)
TSH REFLEX: 1.19 UIU/ML (ref 0.27–4.2)
VITAMIN D 25-HYDROXY: 63.9 NG/ML

## 2022-09-06 ENCOUNTER — TELEPHONE (OUTPATIENT)
Dept: FAMILY MEDICINE CLINIC | Age: 82
End: 2022-09-06

## 2022-09-06 NOTE — TELEPHONE ENCOUNTER
Patients pharmacy called stating the Vesicare 5 mg is NOT covered under her insurance. Pharmacy stated that the Oxybutynin 5 mg is covered [facility]

## 2022-09-06 NOTE — TELEPHONE ENCOUNTER
I responded to this on the paperwork of to my in basket. I explained to patient that there is a higher risk of dementia with the Detrol. This is why switched to the Vesicare. His she comfortable with a higher risk of dementia?

## 2022-10-19 ENCOUNTER — NURSE ONLY (OUTPATIENT)
Dept: FAMILY MEDICINE CLINIC | Age: 82
End: 2022-10-19
Payer: MEDICARE

## 2022-10-19 DIAGNOSIS — Z23 NEEDS FLU SHOT: Primary | ICD-10-CM

## 2022-10-19 PROCEDURE — 90694 VACC AIIV4 NO PRSRV 0.5ML IM: CPT | Performed by: FAMILY MEDICINE

## 2022-10-19 PROCEDURE — G0008 ADMIN INFLUENZA VIRUS VAC: HCPCS | Performed by: FAMILY MEDICINE

## 2022-10-19 NOTE — PROGRESS NOTES
Patient here for flu vaccine patient tolerated injection well, no concerns. [facility], [address] [facility].

## 2022-11-23 ENCOUNTER — TELEPHONE (OUTPATIENT)
Dept: FAMILY MEDICINE CLINIC | Age: 82
End: 2022-11-23

## 2022-11-30 ENCOUNTER — OFFICE VISIT (OUTPATIENT)
Dept: ORTHOPEDIC SURGERY | Age: 82
End: 2022-11-30

## 2022-11-30 VITALS
DIASTOLIC BLOOD PRESSURE: 79 MMHG | SYSTOLIC BLOOD PRESSURE: 150 MMHG | WEIGHT: 124 LBS | HEIGHT: 58 IN | HEART RATE: 58 BPM | OXYGEN SATURATION: 96 % | BODY MASS INDEX: 26.03 KG/M2

## 2022-11-30 DIAGNOSIS — M17.0 BILATERAL PRIMARY OSTEOARTHRITIS OF KNEE: Primary | ICD-10-CM

## 2022-11-30 ASSESSMENT — ENCOUNTER SYMPTOMS
WHEEZING: 0
VOICE CHANGE: 0
NAUSEA: 0
COUGH: 0
SORE THROAT: 0
BACK PAIN: 0
SHORTNESS OF BREATH: 0
VOMITING: 0
COLOR CHANGE: 0

## 2022-11-30 NOTE — PROGRESS NOTES
11/30/2022   Chief Complaint   Patient presents with    Knee Pain     Right knee        Updated HPI: Patient is here for reevaluation of bilateral knees. Patient states her knee pain is greatly improved and is well controlled with Tylenol. She states she had excellent relief from previous injections and feels that they are still working. She has no complaints of any new injury to either knee or any new symptoms. Again, patient states her pain is well controlled at the knees as well as the hip. No changes in her health history and denies any constitutional symptoms. No complications from previous cortisone injections. Previous HPI (8/24/2022): Patient is here for reevaluation of bilateral knees. She states that her left knee pain has essentially resolved but she is having right knee pain at this time that is slightly worse in the last several weeks. She states the cortisone injection has provided excellent relief and she is requesting another one today. She has been able to ambulate without issue and she has no specific groin pain the left side but does have pain over the lateral hip at the cephalomedullary screw incision. No new injuries or complaint since last being seen. No other orthopedic issues. Previous HPI (5/25/2022): Patient is here for bilateral knee pain. Patient states that the left knee is significantly improved since last being seen and did well with the injection. However she is having increased right knee pain and is requesting injections today. She has no new complaints or injuries to either knee since being seen. Continues with mechanical symptoms and osteoarthritic complaints. Previous HPI (2/16/2022):                             Cal Padilla is a 80 y.o. female well-known to myself from previous left intertrochanteric fracture and is being seen today for left knee pain. She states that she has had medial sided knee pain since the surgery.   It is at the medial joint line without any mechanical symptoms. No significant prior left knee pain or injury. Patient states left hip continues to heal well and she is ambulating with a cane and states she is quite happy with her progress she is made. The pain's location is medial joint line of left knee. she describes the symptoms as aching, dull. Symptoms improve with rest. Symptoms worsen with deep knee bending, getting up from a chair, weight bearing, sitting for prolonged periods of time, twisting activities. Patient denies prior injury to knee, denies numbness, tingling, fever, chills. Denies swelling or effusions. No prominent mechanical symptoms. Denies instability. Worse with prolonged weightbearing. Better with rest.    Treatment thus far has included rest, activity modifications, oral medications with minimal relief. Here today to discuss diagnosis and treatment options. Is affecting ADLs. Pain is 3/10 at it's worst.  Currently no pain when not weightbearing    No advanced imaging thus far    Outside reports reviewed: none. Patient's medications, allergies, past medical, surgical, social and family histories were reviewed and updated as appropriate. MA HPI: Patient is a 80year old female. Patient is in the office today with left knee pain. Pain scale  0/10 currently but increases with activity. She denies a previous injury or surgery to her left knee. She states that her left knee has been bothering her since her hip surgery on 10/18/21. Her pain in located on the medial portion of her knee and sometimes down into the anterior portion of there tibia. She states that a couple days ago her knee gave out. Occupation: retired         Medical History  Patient's medications, allergies, past medical, surgical, social and family histories were reviewed and updated as appropriate.     Past Medical History:   Diagnosis Date    Compression fracture of lumbar spine, non-traumatic ([facility] [address] 6/2013    Compression fx, thoracic spine ([facility] [address]     T6 - scheduled for vertebroplasty 2015    Degenerative disc disease, lumbar 2013    Environmental allergies     Hx of colonic polyps 2006    Hypertension     Osteoarthritis of lumbar spine     Osteoporosis     PONV (postoperative nausea and vomiting)     \"just with the hernia surgery\"    Spondylolisthesis of lumbar region 2013    Surgical menopause     TIA (transient ischemic attack) 2006    Unspecified cerebral artery occlusion with cerebral infarction     \"had light stroke 6 yrs ago-everything went black and thought I was gone but only lasted about 10 minuts\" no residual now     Past Surgical History:   Procedure Laterality Date    CHOLECYSTECTOMY  age 29's    open    COLONOSCOPY  2010    DILATION AND CURETTAGE OF UTERUS  age29's    FEMUR FRACTURE SURGERY Left 10/18/2021    FEMUR IM NAIL PB INSERTION performed by Gilbert Kidd DO at [address] [place] [address]      right ing hernia    HYSTERECTOMY  age 29's    \"took both ovaries\"    TONSILLECTOMY  age 22    TRIGGER FINGER RELEASE Right 2014    VERTEBROPLASTY  8/24/15     Family History   Problem Relation Age of Onset    Heart Attack Mother         Myocardial Infarction- 76    Heart Disease Mother         MI    Diabetes Mother     High Blood Pressure Mother     Diabetes Sister         Type 2    Other Brother         AAA    Heart Disease Brother         MI    Heart Disease Father         MI    Diabetes Sister     Heart Disease Brother         MI     Social History     Socioeconomic History    Marital status:    Tobacco Use    Smoking status: Former     Packs/day: 3.00     Years: 2.00     Pack years: 6.00     Types: Cigarettes     Quit date: 1959     Years since quittin.0    Smokeless tobacco: Never   Substance and Sexual Activity    Alcohol use: No     Comment:        CAFFEINE: 2 - cups of 1/2 diet soda, 1/2 citrus & water mixture daily & tea occasionally. Drug use:  No Current Outpatient Medications   Medication Sig Dispense Refill    metoprolol succinate (TOPROL XL) 50 MG extended release tablet Take 1 tablet by mouth daily 90 tablet 1    apixaban (ELIQUIS) 5 MG TABS tablet Take 1 tablet by mouth 2 times daily 60 tablet 0    atorvastatin (LIPITOR) 10 MG tablet Take 1 tablet by mouth daily 90 tablet 1    Ascorbic Acid (VITAMIN C) 500 MG tablet Take 1,000 mg by mouth daily      calcium-vitamin D (OSCAL-500) 500-200 MG-UNIT per tablet Take 1 tablet by mouth daily. solifenacin (VESICARE) 5 MG tablet Take 1 tablet by mouth daily To replace Detrol (Patient not taking: Reported on 11/30/2022) 90 tablet 3    alendronate (FOSAMAX) 70 MG tablet Take 1 tablet by mouth every 7 days (Patient not taking: Reported on 11/30/2022) 13 tablet 3    fluticasone (FLONASE) 50 MCG/ACT nasal spray 2 sprays by Each Nostril route daily (Patient not taking: Reported on 11/30/2022) 1 Bottle 1     No current facility-administered medications for this visit. No Known Allergies      Review of Systems   Constitutional:  Negative for activity change, fatigue and fever. HENT:  Negative for sneezing, sore throat and voice change. Respiratory:  Negative for cough, shortness of breath and wheezing. Cardiovascular:  Negative for leg swelling. Gastrointestinal:  Negative for nausea and vomiting. Musculoskeletal:  Positive for arthralgias. Negative for back pain, gait problem, joint swelling, myalgias, neck pain and neck stiffness. Skin:  Negative for color change, rash and wound. Neurological:  Negative for weakness and numbness. Psychiatric/Behavioral:  Negative for behavioral problems, confusion and self-injury.                                                   Examination:  General Exam:  Vitals: BP (!) 150/79   Pulse 58   Ht 4' 10\" (1.473 m)   Wt 124 lb (56.2 kg)   LMP  (LMP Unknown)   SpO2 96%   BMI 25.92 kg/m²    Physical Exam  Constitutional:       General: She is not in acute distress. Appearance: Normal appearance. She is normal weight. HENT:      Head: Normocephalic and atraumatic. Eyes:      General:         Right eye: No discharge. Left eye: No discharge. Extraocular Movements: Extraocular movements intact. Cardiovascular:      Pulses: Normal pulses. Pulmonary:      Effort: Pulmonary effort is normal.      Breath sounds: Normal breath sounds. Musculoskeletal:         General: Tenderness present. No swelling, deformity or signs of injury. Cervical back: Normal range of motion. Right hip: Normal.      Left hip: Normal.      Right upper leg: Normal.      Left upper leg: Normal.      Right knee: Bony tenderness and crepitus present. No swelling, deformity, effusion, erythema, ecchymosis or lacerations. Normal range of motion. Tenderness present over the medial joint line. No LCL laxity, MCL laxity, ACL laxity or PCL laxity. Normal alignment, normal meniscus and normal patellar mobility. Normal pulse. Instability Tests: Anterior drawer test negative. Posterior drawer test negative. Anterior Lachman test negative. Medial Germain test negative and lateral Germain test negative. Left knee: Bony tenderness present. No swelling, deformity, effusion, erythema, ecchymosis, lacerations or crepitus. Normal range of motion. Tenderness present over the medial joint line. No lateral joint line tenderness. No LCL laxity, MCL laxity, ACL laxity or PCL laxity. Normal alignment, normal meniscus and normal patellar mobility. Normal pulse. Instability Tests: Anterior drawer test negative. Posterior drawer test negative. Medial Germain test negative and lateral Germain test negative. Right lower leg: Normal. No edema. Left lower leg: Normal. No edema. Right ankle: Normal.      Left ankle: Normal.      Right foot: Normal.      Left foot: Normal.   Skin:     General: Skin is warm and dry.       Capillary Refill: Capillary refill takes less than 2 seconds. Neurological:      General: No focal deficit present. Mental Status: She is alert and oriented to person, place, and time. Mental status is at baseline. Gait: Gait normal.   Psychiatric:         Mood and Affect: Mood normal.         Behavior: Behavior normal.      RIGHT KNEE EXAMINATION       OBSERVATION / INSPECTION     Gait: Nonantalgic    Alignment: Neutral     Scars: None     Muscle atrophy: Minimal    Effusion: None     Warmth: None     Discoloration: none       TENDERNESS / CREPITUS (T / C): Patella - / -     Lateral joint line - / -  Medial joint line  + / -     Peripatellar lateral - / -  Peripatellar medial  - / -     Medial plica - / -  Lateral plica - / -    Patellar tendon - / -   Prepatellar Bursa - / -     Popliteal fossa - / -    Gastrocnemius - / -    Quadricep - / -   Quad tendon - / -      Tibial tubercle - / -     Thigh/hamstring - / -  Pes anserine/HS - / -     ITB - / -     Tibia - / -   Fibula - / -    Tib/fib joint - / -     MFC - / -    LFC - / -     MCL: Proximal - / -  Distal - / -    LCL - / -    MCL - / -      ROM: (* = pain)  PASSIVE  ACTIVE     Left :    0 / 145  0 / 145      Right :    0 / 145  0 / 145      PATELLOFEMORAL EXAMINATION:    See above noted areas of tenderness.      Patella position     Subluxation / dislocation: Centered     Sup. / Inf; Normal     Crepitus (PF): Absent     Patellar Mobility:     Medial-lateral: Normal     Superior-inferior: Normal     Inferior tilt Normal     Patellar tendon: Normal     Lateral tilt: Normal     J-sign: None     Patellofemoral grind: No pain         MENISCAL SIGNS:     Pain on terminal extension: -    Pain on terminal flexion: -      LIGAMENT EXAMINATION:    ACL / Lachman: normal (-1 to 2mm)      PCL-Post.  drawer: normal 0 to 2mm    MCL- Valgus: normal 0 to 2mm    LCL- Varus:  normal 0 to 2mm      STRENGTH: (* = with pain) PAINFUL SIDE    Quadricep 5/5    Hamstrin/5      EXTREMITY NEURO-VASCULAR injection. I explained that because she is over a year out from the hip surgery there is no need for further evaluation regards to the hip and she has healed that well. If the hip becomes bothersome she should call and schedule appointment I will be happy to reevaluate her and obtain new imaging. In regards to her knees, if they again become more problematic and painful we can discuss further injections and she can call and schedule appointment. Otherwise, patient can follow-up with me as needed. She is range of motion and weightbearing as tolerated. I encouraged her to continue with the previously provided home exercises from myself and therapy. All questions were answered and patient voices her understanding.     Electronically signed by Sharmaine Hernandez DO on 11/30/2022 at 9:16 AM

## 2022-11-30 NOTE — PROGRESS NOTES
Patient comes in today for a follow up for bilateral knee pain. She was last seen in our office on 8/24/22. She was given a steroid injection in her right knee at that time. She rates her pain at 0/10 today. She states that she will sometimes have pain but is able to take tylenol to control the pain. She denies any new falls or injuries.

## 2022-12-27 ENCOUNTER — OFFICE VISIT (OUTPATIENT)
Dept: FAMILY MEDICINE CLINIC | Age: 82
End: 2022-12-27
Payer: MEDICARE

## 2022-12-27 ENCOUNTER — TELEPHONE (OUTPATIENT)
Dept: FAMILY MEDICINE CLINIC | Age: 82
End: 2022-12-27

## 2022-12-27 VITALS
WEIGHT: 121.6 LBS | OXYGEN SATURATION: 95 % | BODY MASS INDEX: 25.41 KG/M2 | SYSTOLIC BLOOD PRESSURE: 122 MMHG | DIASTOLIC BLOOD PRESSURE: 84 MMHG | HEART RATE: 73 BPM

## 2022-12-27 DIAGNOSIS — E78.5 HYPERLIPIDEMIA, UNSPECIFIED HYPERLIPIDEMIA TYPE: ICD-10-CM

## 2022-12-27 DIAGNOSIS — M81.0 AGE-RELATED OSTEOPOROSIS WITHOUT CURRENT PATHOLOGICAL FRACTURE: ICD-10-CM

## 2022-12-27 DIAGNOSIS — S22.080D COMPRESSION FRACTURE OF T12 VERTEBRA WITH ROUTINE HEALING, SUBSEQUENT ENCOUNTER: Primary | ICD-10-CM

## 2022-12-27 PROCEDURE — 99213 OFFICE O/P EST LOW 20 MIN: CPT | Performed by: FAMILY MEDICINE

## 2022-12-27 PROCEDURE — 1123F ACP DISCUSS/DSCN MKR DOCD: CPT | Performed by: FAMILY MEDICINE

## 2022-12-27 PROCEDURE — 3074F SYST BP LT 130 MM HG: CPT | Performed by: FAMILY MEDICINE

## 2022-12-27 PROCEDURE — 3078F DIAST BP <80 MM HG: CPT | Performed by: FAMILY MEDICINE

## 2022-12-27 RX ORDER — ATORVASTATIN CALCIUM 10 MG/1
10 TABLET, FILM COATED ORAL DAILY
Qty: 90 TABLET | Refills: 3 | Status: SHIPPED | OUTPATIENT
Start: 2022-12-27

## 2022-12-27 RX ORDER — HYDROCODONE BITARTRATE AND ACETAMINOPHEN 5; 325 MG/1; MG/1
TABLET ORAL
COMMUNITY
Start: 2022-12-17

## 2022-12-27 RX ORDER — METHOCARBAMOL 500 MG/1
TABLET, FILM COATED ORAL
COMMUNITY
Start: 2022-12-17

## 2022-12-27 RX ORDER — LIDOCAINE 50 MG/G
PATCH TOPICAL
COMMUNITY
Start: 2022-12-17

## 2022-12-27 RX ORDER — ALENDRONATE SODIUM 70 MG/1
70 TABLET ORAL
Qty: 13 TABLET | Refills: 3 | Status: SHIPPED | OUTPATIENT
Start: 2022-12-27

## 2022-12-27 NOTE — TELEPHONE ENCOUNTER
Patient called said she was putting a piece a wood in the stove that was heavy and she cracked her vertebra  on 15th she went to urgent care in [facility]  needs a Orthopedic surgeon referral same building as Dr Silvia Stout she sees for her hip and knees

## 2022-12-27 NOTE — PROGRESS NOTES
Heart Disease Mother         MI    Diabetes Mother     High Blood Pressure Mother     Diabetes Sister         Type 2    Other Brother         AAA    Heart Disease Brother         MI    Heart Disease Father         MI    Diabetes Sister     Heart Disease Brother         MI       Current Outpatient Medications on File Prior to Visit   Medication Sig Dispense Refill    HYDROcodone-acetaminophen (NORCO) 5-325 MG per tablet take 1 tablet by mouth every 8 hours AS NEEDED FOR PAIN for up to 7 days      lidocaine (LIDODERM) 5 % apply 1 patch TO THE AFFECTED AREA DAILY. LEAVE ON FOR 12 HOURS AND THEN OFF FOR 12 HOURS.      methocarbamol (ROBAXIN) 500 MG tablet take 1 tablet by mouth four times a day      metoprolol succinate (TOPROL XL) 50 MG extended release tablet Take 1 tablet by mouth daily 90 tablet 1    apixaban (ELIQUIS) 5 MG TABS tablet Take 1 tablet by mouth 2 times daily 60 tablet 0    Ascorbic Acid (VITAMIN C) 500 MG tablet Take 1,000 mg by mouth daily      calcium-vitamin D (OSCAL-500) 500-200 MG-UNIT per tablet Take 1 tablet by mouth daily. solifenacin (VESICARE) 5 MG tablet Take 1 tablet by mouth daily To replace Detrol (Patient not taking: Reported on 11/30/2022) 90 tablet 3    fluticasone (FLONASE) 50 MCG/ACT nasal spray 2 sprays by Each Nostril route daily (Patient not taking: Reported on 11/30/2022) 1 Bottle 1     No current facility-administered medications on file prior to visit. Objective:           Physical Exam  Vitals and nursing note reviewed. Constitutional:       Appearance: She is well-developed. HENT:      Head: Normocephalic and atraumatic. Musculoskeletal:      Lumbar back: Normal. No swelling, edema, deformity, spasms, tenderness or bony tenderness. Normal range of motion.  Negative right straight leg raise test and negative left straight leg raise test.      Comments: 5/5 strength quadraceps, hamstrings bilaterally    Good foot dorsiflexion and plantar flexion Skin:     General: Skin is warm and dry. Neurological:      Sensory: No sensory deficit. Gait: Gait abnormal (slow, stiff gait). Deep Tendon Reflexes:      Reflex Scores:       Patellar reflexes are 2+ on the right side and 2+ on the left side. Achilles reflexes are 2+ on the right side and 2+ on the left side. Vitals:    12/27/22 1431   BP: 122/84   Site: Left Upper Arm   Position: Sitting   Cuff Size: Medium Adult   Pulse: 73   SpO2: 95%   Weight: 121 lb 9.6 oz (55.2 kg)     Body mass index is 25.41 kg/m². Wt Readings from Last 3 Encounters:   12/27/22 121 lb 9.6 oz (55.2 kg)   11/30/22 124 lb (56.2 kg)   08/31/22 124 lb (56.2 kg)     BP Readings from Last 3 Encounters:   12/27/22 122/84   11/30/22 (!) 150/79   08/31/22 (!) 142/70      12/17/22   CT-SPINE LUMBAR WO CONTRAST   Narrative   CT-SPINE LUMBAR WO CONTRAST    DATE OF SERVICE: 12/17/2022 10:09 AM    TECHNIQUE: Contiguous axial images through the lumbar spine without contrast        COMPARISON: None    CLINICAL INDICATION: Low back pain, trauma    CT radiation dose optimization techniques (automated exposure control, and use of iterative reconstruction techniques, or adjustment of the mA and/or kV according to patient size) were used to limit patient radiation dose. FINDINGS:  No prevertebral or paraspinal soft tissue swelling or fluid collection evident. Atherosclerotic disease in a nonaneurysmal abdominal aorta. The visualized intra-abdominal and retroperitoneal structures show no acute abnormality. Visualized lung bases are clear. Overlying soft tissues are as expected. There is vertebral body height loss at T12, L1, L2, L3, L4, and L5. These are age-indeterminate without direct comparison. Bilateral pars defects at L5 with anterolisthesis of L5 on S1 of approximately 12 mm. Multilevel endplate sclerosis and osteophytic spurring. Facet arthropathy at L5/S1. T12/L1 and L1/L2: Minor disc changes.  Central canal, lateral recesses, and neuroforamen appear patent. L2/3: Mild broad-based disc bulge. Minimal facet arthropathy. Central canal, lateral recesses, and neural foramen do not appear to be significantly narrowed. L3/4: Circumferential disc bulge. Mild facet arthropathy. Moderate right greater than left neuroforaminal narrowing. L4/5: Mild circumferential disc bulge. Facet arthropathy. Central canal lateral recesses appear patent. Moderate right greater than left neuroforaminal narrowing. L5/S1: Anterolisthesis and bilateral spondylolysis. Mild neuroforaminal narrowing. Central canal is mildly narrowed. Impression   Compression deformity at T12 and throughout the lumbar spine. These are age-indeterminate without direct comparison. MRI evaluation is recommended to assess the acuity. 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 No results found for this visit on 12/27/22. Assessment:       Diagnosis Orders   1. Compression fracture of T12 vertebra with routine healing, subsequent encounter  MRI THORACIC SPINE WO CONTRAST      2. Age-related osteoporosis without current pathological fracture  alendronate (FOSAMAX) 70 MG tablet      3. Hyperlipidemia, unspecified hyperlipidemia type  atorvastatin (LIPITOR) 10 MG tablet              Plan:      Since patient claims to be pain-free, will hold off on referral to surgeon. Unfortunately, patient was not getting her Fosamax despite having refills. We will send another refill today. This could prevent her from having future fractures. Recheck based on MRI. Continue with as needed Tylenol or with Robaxin and lidocaine patches. Return in about 2 weeks (around 1/10/2023), or if symptoms worsen or fail to improve, for (AWV LPN, schedule on phone). thoracic fracture.

## 2022-12-28 ENCOUNTER — TELEMEDICINE (OUTPATIENT)
Dept: FAMILY MEDICINE CLINIC | Age: 82
End: 2022-12-28
Payer: MEDICARE

## 2022-12-28 DIAGNOSIS — Z00.00 MEDICARE ANNUAL WELLNESS VISIT, SUBSEQUENT: Primary | ICD-10-CM

## 2022-12-28 PROCEDURE — G0439 PPPS, SUBSEQ VISIT: HCPCS | Performed by: FAMILY MEDICINE

## 2022-12-28 PROCEDURE — 1123F ACP DISCUSS/DSCN MKR DOCD: CPT | Performed by: FAMILY MEDICINE

## 2022-12-28 SDOH — ECONOMIC STABILITY: FOOD INSECURITY: WITHIN THE PAST 12 MONTHS, THE FOOD YOU BOUGHT JUST DIDN'T LAST AND YOU DIDN'T HAVE MONEY TO GET MORE.: NEVER TRUE

## 2022-12-28 SDOH — ECONOMIC STABILITY: FOOD INSECURITY: WITHIN THE PAST 12 MONTHS, YOU WORRIED THAT YOUR FOOD WOULD RUN OUT BEFORE YOU GOT MONEY TO BUY MORE.: NEVER TRUE

## 2022-12-28 ASSESSMENT — PATIENT HEALTH QUESTIONNAIRE - PHQ9
SUM OF ALL RESPONSES TO PHQ QUESTIONS 1-9: 0
1. LITTLE INTEREST OR PLEASURE IN DOING THINGS: 0
2. FEELING DOWN, DEPRESSED OR HOPELESS: 0
SUM OF ALL RESPONSES TO PHQ9 QUESTIONS 1 & 2: 0

## 2022-12-28 ASSESSMENT — LIFESTYLE VARIABLES
HOW OFTEN DO YOU HAVE A DRINK CONTAINING ALCOHOL: NEVER
HOW MANY STANDARD DRINKS CONTAINING ALCOHOL DO YOU HAVE ON A TYPICAL DAY: PATIENT DOES NOT DRINK

## 2022-12-28 ASSESSMENT — SOCIAL DETERMINANTS OF HEALTH (SDOH): HOW HARD IS IT FOR YOU TO PAY FOR THE VERY BASICS LIKE FOOD, HOUSING, MEDICAL CARE, AND HEATING?: NOT HARD AT ALL

## 2022-12-28 NOTE — PATIENT INSTRUCTIONS
Personalized Preventive Plan for Anthony Fee - 12/28/2022  Medicare offers a range of preventive health benefits. Some of the tests and screenings are paid in full while other may be subject to a deductible, co-insurance, and/or copay. Some of these benefits include a comprehensive review of your medical history including lifestyle, illnesses that may run in your family, and various assessments and screenings as appropriate. After reviewing your medical record and screening and assessments performed today your provider may have ordered immunizations, labs, imaging, and/or referrals for you. A list of these orders (if applicable) as well as your Preventive Care list are included within your After Visit Summary for your review. Other Preventive Recommendations:    A preventive eye exam performed by an eye specialist is recommended every 1-2 years to screen for glaucoma; cataracts, macular degeneration, and other eye disorders. A preventive dental visit is recommended every 6 months. Try to get at least 150 minutes of exercise per week or 10,000 steps per day on a pedometer . Order or download the FREE \"Exercise & Physical Activity: Your Everyday Guide\" from [employer]. Call 6-146.263.1793 or search [employer] online. You need 9693-7308 mg of calcium and 3011-7742 IU of vitamin D per day. It is possible to meet your calcium requirement with diet alone, but a vitamin D supplement is usually necessary to meet this goal.  When exposed to the sun, use a sunscreen that protects against both UVA and UVB radiation with an SPF of 30 or greater. Reapply every 2 to 3 hours or after sweating, drying off with a towel, or swimming. Always wear a seat belt when traveling in a car. Always wear a helmet when riding a bicycle or motorcycle.

## 2022-12-28 NOTE — PROGRESS NOTES
Medicare Annual Wellness Visit    Norah Sainz is here for Medicare AWV    Assessment & Plan   Medicare annual wellness visit, subsequent      Recommendations for Preventive Services Due: see orders and patient instructions/AVS.  Recommended screening schedule for the next 5-10 years is provided to the patient in written form: see Patient Instructions/AVS.     No follow-ups on file. Subjective       Patient's complete Health Risk Assessment and screening values have been reviewed and are found in Flowsheets. The following problems were reviewed today and where indicated follow up appointments were made and/or referrals ordered. Positive Risk Factor Screenings with Interventions:    Fall Risk:  Do you feel unsteady or are you worried about falling? : no  2 or more falls in past year?: no  Fall with injury in past year?: (!) yes (broke hip last October 2021-had jazzy placed, doing pretty well)     Interventions:    Patient comments: patient states she broke her hip in October 2021 and had a jazzy placed and is doing pretty well. Opioid Risk: (Low risk score <55) Opioid risk score: 15    Patient is low risk for opioid use disorder or overdose. Last PDMP Luiza Harris as Reviewed:  Review User Review Instant Review Result              Last Controlled Substance Monitoring Documentation      [address] Office Visit from 8/15/2018 in [address] The Prescription Monitoring Report for this patient was reviewed today. filed at 08/15/2018 1759   Periodic Controlled Substance Monitoring No signs of potential drug abuse or diversion identified. filed at 08/15/2018 1755   Acute Pain Prescriptions Severe pain not adequately treated with lower dose.  filed at 08/15/2018 1752                Weight and Activity:  Physical Activity: Inactive    Days of Exercise per Week: 0 days    Minutes of Exercise per Session: 0 min     On average, how many days per week do you engage in moderate to strenuous exercise (like a brisk walk)?: 0 days  Have you lost any weight without trying in the past 3 months?: No         Inactivity Interventions:  Patient comments: patient states she has a compression fracture in her T12 and was seen by PCP yesterday. Patient declined any further interventions or treatment         Safety:  Do you always fasten your seatbelt when you are in a car?: (!) No  Interventions:  Patient comments: patient states she does not always wear her seatbelt and was encouraged to do so. ADL's:   Patient reports needing help with:  Select all that apply: (!) Laundry, Housekeeping, Banking/Finances, Shopping, Food Preparation, Transportation ()  Interventions:  Patient comments: patient states her  has been helping her with ADLs at this time, due to her fracture  Patient declined any further interventions or treatment    Advanced Directives:  Do you have a Living Will?: (!) No    Intervention:  has NO advanced directive - information provided verbally                       Objective      Patient-Reported Vitals  No data recorded     Unable to obtain 3 vital signs due to patient not having equipment to take blood pressure/temperature. No Known Allergies  Prior to Visit Medications    Medication Sig Taking? Authorizing Provider   HYDROcodone-acetaminophen (NORCO) 5-325 MG per tablet take 1 tablet by mouth every 8 hours AS NEEDED FOR PAIN for up to 7 days Yes Historical Provider, MD   lidocaine (LIDODERM) 5 % apply 1 patch TO THE AFFECTED AREA DAILY. LEAVE ON FOR 12 HOURS AND THEN OFF FOR 12 HOURS.  Yes Historical Provider, MD   methocarbamol (ROBAXIN) 500 MG tablet take 1 tablet by mouth four times a day Yes Historical Provider, MD   atorvastatin (LIPITOR) 10 MG tablet Take 1 tablet by mouth daily Yes Wing Saira MD   alendronate (FOSAMAX) 70 MG tablet Take 1 tablet by mouth every 7 days Yes Wing Saira MD   solifenacin (VESICARE) 5 MG tablet Take 1 tablet by mouth daily To replace Detrol Yes Luis Solomon MD   metoprolol succinate (TOPROL XL) 50 MG extended release tablet Take 1 tablet by mouth daily Yes Luis Solomon MD   apixaban (ELIQUIS) 5 MG TABS tablet Take 1 tablet by mouth 2 times daily Yes Sarabjit Varela MD   Ascorbic Acid (VITAMIN C) 500 MG tablet Take 1,000 mg by mouth daily Yes Historical Provider, MD   calcium-vitamin D (OSCAL-500) 500-200 MG-UNIT per tablet Take 1 tablet by mouth daily. Yes Historical Provider, MD   fluticasone (FLONASE) 50 MCG/ACT nasal spray 2 sprays by Each Nostril route daily  Patient not taking: No sig reported  Luis Solomon MD       CareTeam (Including outside providers/suppliers regularly involved in providing care):   Patient Care Team:  Luis Solomon MD as PCP - Garrett Prince MD as PCP - [facility] Provider  Teresita Summers MD as Consulting Physician (Orthopedic Surgery)     Reviewed and updated this visit:  Tobacco  Allergies  Meds  Med Hx  Surg Hx  Soc Hx  Fam Hx        I, Fran Peterson LPN, 49/07/0456, performed the documented evaluation under the direct supervision of the attending physician. Eamon Wells, was evaluated through a synchronous (real-time) audio encounter. The patient (or guardian if applicable) is aware that this is a billable service, which includes applicable co-pays. This Virtual Visit was conducted with patient's (and/or legal guardian's) consent. The visit was conducted pursuant to the emergency declaration under the [address] [facility], [address] [facility] authority and the [employer] Act. Patient identification was verified, and a caregiver was present when appropriate. The patient was located at Home: [address] [place] [address]. Provider was located at [facility] (Appt Dept): [facility],  [address] [facility].         Total time spent for this encounter: Not billed by time    --Carly Kelley LPN on 57/38/4305 at 11:34 AM    An electronic signature was used to authenticate this note.

## 2023-01-11 ENCOUNTER — HOSPITAL ENCOUNTER (OUTPATIENT)
Dept: MRI IMAGING | Age: 83
Discharge: HOME | End: 2023-01-11
Payer: MEDICARE

## 2023-01-11 DIAGNOSIS — S22.080D COMPRESSION FRACTURE OF T12 VERTEBRA WITH ROUTINE HEALING, SUBSEQUENT ENCOUNTER: ICD-10-CM

## 2023-01-11 PROCEDURE — 72146 MRI CHEST SPINE W/O DYE: CPT

## 2023-01-12 DIAGNOSIS — S22.080D COMPRESSION FRACTURE OF T12 VERTEBRA WITH ROUTINE HEALING, SUBSEQUENT ENCOUNTER: Primary | ICD-10-CM

## 2023-01-19 ENCOUNTER — HOSPITAL ENCOUNTER (OUTPATIENT)
Dept: GENERAL RADIOLOGY | Age: 83
Discharge: HOME | End: 2023-01-19
Payer: MEDICARE

## 2023-01-19 ENCOUNTER — OFFICE VISIT (OUTPATIENT)
Dept: FAMILY MEDICINE CLINIC | Age: 83
End: 2023-01-19
Payer: MEDICARE

## 2023-01-19 ENCOUNTER — TELEPHONE (OUTPATIENT)
Dept: FAMILY MEDICINE CLINIC | Age: 83
End: 2023-01-19

## 2023-01-19 ENCOUNTER — HOSPITAL ENCOUNTER (OUTPATIENT)
Age: 83
Discharge: HOME | End: 2023-01-19
Payer: MEDICARE

## 2023-01-19 VITALS
WEIGHT: 121 LBS | BODY MASS INDEX: 25.4 KG/M2 | HEART RATE: 85 BPM | SYSTOLIC BLOOD PRESSURE: 148 MMHG | DIASTOLIC BLOOD PRESSURE: 96 MMHG | HEIGHT: 58 IN

## 2023-01-19 DIAGNOSIS — S22.080D COMPRESSION FRACTURE OF T12 VERTEBRA WITH ROUTINE HEALING, SUBSEQUENT ENCOUNTER: ICD-10-CM

## 2023-01-19 DIAGNOSIS — R07.9 RIGHT-SIDED CHEST PAIN: ICD-10-CM

## 2023-01-19 DIAGNOSIS — R10.11 RIGHT UPPER QUADRANT ABDOMINAL PAIN: ICD-10-CM

## 2023-01-19 DIAGNOSIS — R07.9 RIGHT-SIDED CHEST PAIN: Primary | ICD-10-CM

## 2023-01-19 PROCEDURE — 96372 THER/PROPH/DIAG INJ SC/IM: CPT | Performed by: FAMILY MEDICINE

## 2023-01-19 PROCEDURE — 74019 RADEX ABDOMEN 2 VIEWS: CPT

## 2023-01-19 PROCEDURE — 3079F DIAST BP 80-89 MM HG: CPT | Performed by: FAMILY MEDICINE

## 2023-01-19 PROCEDURE — 99213 OFFICE O/P EST LOW 20 MIN: CPT | Performed by: FAMILY MEDICINE

## 2023-01-19 PROCEDURE — 1123F ACP DISCUSS/DSCN MKR DOCD: CPT | Performed by: FAMILY MEDICINE

## 2023-01-19 PROCEDURE — 71100 X-RAY EXAM RIBS UNI 2 VIEWS: CPT

## 2023-01-19 PROCEDURE — 3077F SYST BP >= 140 MM HG: CPT | Performed by: FAMILY MEDICINE

## 2023-01-19 RX ORDER — KETOROLAC TROMETHAMINE 30 MG/ML
30 INJECTION, SOLUTION INTRAMUSCULAR; INTRAVENOUS ONCE
Status: COMPLETED | OUTPATIENT
Start: 2023-01-19 | End: 2023-01-19

## 2023-01-19 RX ORDER — HYDROCODONE BITARTRATE AND ACETAMINOPHEN 5; 325 MG/1; MG/1
1 TABLET ORAL EVERY 4 HOURS PRN
Qty: 18 TABLET | Refills: 0 | Status: SHIPPED | OUTPATIENT
Start: 2023-01-19 | End: 2023-01-20 | Stop reason: SDUPTHER

## 2023-01-19 RX ADMIN — KETOROLAC TROMETHAMINE 30 MG: 30 INJECTION, SOLUTION INTRAMUSCULAR; INTRAVENOUS at 15:02

## 2023-01-19 ASSESSMENT — PATIENT HEALTH QUESTIONNAIRE - PHQ9
2. FEELING DOWN, DEPRESSED OR HOPELESS: 0
SUM OF ALL RESPONSES TO PHQ QUESTIONS 1-9: 0
SUM OF ALL RESPONSES TO PHQ9 QUESTIONS 1 & 2: 0
SUM OF ALL RESPONSES TO PHQ QUESTIONS 1-9: 0
1. LITTLE INTEREST OR PLEASURE IN DOING THINGS: 0

## 2023-01-19 NOTE — PROGRESS NOTES
Patient ID: Jose Carrion 1940    Chief Complaint   Patient presents with    Hip Pain     Left side hip/back pain 8/10 pain worsening         HPI    Hip and back pain: 8/10. Severe. Aggravated by moving, breathing deeply. No new injury. Thinks this is related to her injury last month. See last note and ER note for details. Has already had her gall bladder removed.   No constipation or diarrhea      Patient Active Problem List   Diagnosis    Essential hypertension    Osteoporosis    Hx of colonic polyps    Degenerative disc disease, lumbar    Spondylolisthesis of lumbar region    Osteoarthritis of cervical spine    SVT (supraventricular tachycardia) (HCC)    Chronic left-sided low back pain with left-sided sciatica    Nonrheumatic aortic valve insufficiency    Mitral regurgitation    History of compression fracture of spine    Anticoagulated    Paroxysmal atrial fibrillation (HCC)    Nondisplaced intertrochanteric fracture of left femur, subsequent encounter for closed fracture with routine healing    Bilateral primary osteoarthritis of knee    S/p left hip fracture       Past Surgical History:   Procedure Laterality Date    CHOLECYSTECTOMY  age 29's    open    COLONOSCOPY  2010    DILATION AND CURETTAGE OF UTERUS  age31's    FEMUR FRACTURE SURGERY Left 10/18/2021    FEMUR IM NAIL PB INSERTION performed by Candace Brown DO at [address] Right 2014    HERNIA REPAIR      right ing hernia    HYSTERECTOMY ([address])  age 29's    \"took both ovaries\"    TONSILLECTOMY  age 22    VERTEBROPLASTY  8/24/15       Family History   Problem Relation Age of Onset    Heart Attack Mother         Myocardial Infarction- 76    Heart Disease Mother         MI    Diabetes Mother     High Blood Pressure Mother     Heart Disease Father         MI    Diabetes Sister         Type 2    Diabetes Sister     No Known Problems Sister     No Known Problems Sister     No Known Problems Sister     Bone Cancer Sister     Heart Attack Brother     Other Brother         AAA    Heart Disease Brother         MI    Heart Attack Brother     Heart Disease Brother         MI    Heart Attack Brother     Heart Attack Brother     Cancer Brother         unknown    No Known Problems Brother     Other Brother          in 3 luis accident    Heart Disease Brother     Heart Surgery Brother         5 bypasses       Current Outpatient Medications on File Prior to Visit   Medication Sig Dispense Refill    lidocaine (LIDODERM) 5 % apply 1 patch TO THE AFFECTED AREA DAILY. LEAVE ON FOR 12 HOURS AND THEN OFF FOR 12 HOURS. atorvastatin (LIPITOR) 10 MG tablet Take 1 tablet by mouth daily 90 tablet 3    alendronate (FOSAMAX) 70 MG tablet Take 1 tablet by mouth every 7 days 13 tablet 3    solifenacin (VESICARE) 5 MG tablet Take 1 tablet by mouth daily To replace Detrol 90 tablet 3    metoprolol succinate (TOPROL XL) 50 MG extended release tablet Take 1 tablet by mouth daily 90 tablet 1    apixaban (ELIQUIS) 5 MG TABS tablet Take 1 tablet by mouth 2 times daily 60 tablet 0    fluticasone (FLONASE) 50 MCG/ACT nasal spray 2 sprays by Each Nostril route daily 1 Bottle 1    Ascorbic Acid (VITAMIN C) 500 MG tablet Take 1,000 mg by mouth daily      calcium-vitamin D (OSCAL-500) 500-200 MG-UNIT per tablet Take 1 tablet by mouth daily. HYDROcodone-acetaminophen (NORCO) 5-325 MG per tablet take 1 tablet by mouth every 8 hours AS NEEDED FOR PAIN for up to 7 days (Patient not taking: Reported on 2023)      methocarbamol (ROBAXIN) 500 MG tablet take 1 tablet by mouth four times a day (Patient not taking: Reported on 2023)       No current facility-administered medications on file prior to visit. Objective:           Physical Exam  Vitals and nursing note reviewed. Constitutional:       General: She is not in acute distress. Appearance: She is well-developed.  She is not toxic-appearing. HENT:      Head: Normocephalic and atraumatic. Cardiovascular:      Rate and Rhythm: Normal rate and regular rhythm. Heart sounds: Normal heart sounds, S1 normal and S2 normal.   Pulmonary:      Effort: Pulmonary effort is normal. No respiratory distress. Breath sounds: Normal breath sounds. No wheezing. Chest:      Chest wall: No swelling or tenderness. Musculoskeletal:      Cervical back: Neck supple. Skin:     General: Skin is warm and dry. Neurological:      Mental Status: She is alert. Psychiatric:         Speech: Speech normal.         Behavior: Behavior normal.         Thought Content: Thought content normal.     Vitals:    01/19/23 1426 01/19/23 1429   BP: (!) 160/80 (!) 148/96   Site: Left Upper Arm Right Upper Arm   Position: Sitting Sitting   Cuff Size: Medium Adult Medium Adult   Pulse: 85    Weight: 121 lb (54.9 kg)    Height: 4' 10\" (1.473 m)      Body mass index is 25.29 kg/m². Wt Readings from Last 3 Encounters:   01/19/23 121 lb (54.9 kg)   12/27/22 121 lb 9.6 oz (55.2 kg)   11/30/22 124 lb (56.2 kg)     BP Readings from Last 3 Encounters:   01/19/23 (!) 148/96   12/27/22 122/84   11/30/22 (!) 150/79         1. Acute to subacute T12 compression fracture deformity with mild anterior   vertebral body height loss but no significant posterior cortex retropulsion. 2. Multiple chronic compression deformities in the thoracic and upper lumbar   spine status post prior T6 and T9 vertebroplasty. 3. Mild-to-moderate multilevel spinal canal stenosis as detailed above. No results found for this visit on 01/19/23. Age-indeterminate compression fractures of several lumbar vertebral bodies. Assessment:       Diagnosis Orders   1. Right-sided chest pain  XR RIBS RIGHT (2 VIEWS)    XR ABDOMEN STANDARD    ketorolac (TORADOL) injection 30 mg    HYDROcodone-acetaminophen (NORCO) 5-325 MG per tablet      2.  Right upper quadrant abdominal pain  ketorolac (TORADOL) injection 30 mg    HYDROcodone-acetaminophen (NORCO) 5-325 MG per tablet      3. Compression fracture of T12 vertebra with routine healing, subsequent encounter  HYDROcodone-acetaminophen (NORCO) 5-325 MG per tablet              Plan:      Not sure what her pain is from. Reviewed xray of abdomen and ribs. Will have her f/u with spine surgeon    Return if symptoms worsen or fail to improve.

## 2023-01-19 NOTE — TELEPHONE ENCOUNTER
Patient called says her hip is bothering her pain down her back, taking tylenol and is not helping with her pain.  pain scale 8/10

## 2023-01-20 DIAGNOSIS — S22.080D COMPRESSION FRACTURE OF T12 VERTEBRA WITH ROUTINE HEALING, SUBSEQUENT ENCOUNTER: ICD-10-CM

## 2023-01-20 DIAGNOSIS — R10.11 RIGHT UPPER QUADRANT ABDOMINAL PAIN: ICD-10-CM

## 2023-01-20 DIAGNOSIS — R07.9 RIGHT-SIDED CHEST PAIN: ICD-10-CM

## 2023-01-20 RX ORDER — HYDROCODONE BITARTRATE AND ACETAMINOPHEN 5; 325 MG/1; MG/1
1 TABLET ORAL EVERY 4 HOURS PRN
Qty: 18 TABLET | Refills: 0 | Status: SHIPPED | OUTPATIENT
Start: 2023-01-20 | End: 2023-01-23

## 2023-01-24 ENCOUNTER — HOSPITAL ENCOUNTER (OUTPATIENT)
Age: 83
Discharge: HOME | End: 2023-01-24
Payer: MEDICARE

## 2023-01-24 LAB
ALBUMIN SERPL-MCNC: 4.4 GM/DL (ref 3.4–5)
ALP BLD-CCNC: 76 IU/L (ref 40–129)
ALT SERPL-CCNC: 8 U/L (ref 10–40)
ANION GAP SERPL CALCULATED.3IONS-SCNC: 7 MMOL/L (ref 4–16)
AST SERPL-CCNC: 13 IU/L (ref 15–37)
BILIRUB SERPL-MCNC: 0.8 MG/DL (ref 0–1)
BUN BLDV-MCNC: 22 MG/DL (ref 6–23)
CALCIUM SERPL-MCNC: 9.2 MG/DL (ref 8.3–10.6)
CHLORIDE BLD-SCNC: 102 MMOL/L (ref 99–110)
CO2: 31 MMOL/L (ref 21–32)
CREAT SERPL-MCNC: 0.6 MG/DL (ref 0.6–1.1)
GFR SERPL CREATININE-BSD FRML MDRD: >60 ML/MIN/1.73M2
GLUCOSE BLD-MCNC: 108 MG/DL (ref 70–99)
MAGNESIUM: 2.3 MG/DL (ref 1.8–2.4)
PHOSPHORUS: 3.5 MG/DL (ref 2.5–4.9)
POTASSIUM SERPL-SCNC: 4.2 MMOL/L (ref 3.5–5.1)
SODIUM BLD-SCNC: 140 MMOL/L (ref 135–145)
TOTAL PROTEIN: 6.9 GM/DL (ref 6.4–8.2)

## 2023-01-24 PROCEDURE — 84100 ASSAY OF PHOSPHORUS: CPT

## 2023-01-24 PROCEDURE — 36415 COLL VENOUS BLD VENIPUNCTURE: CPT

## 2023-01-24 PROCEDURE — 83970 ASSAY OF PARATHORMONE: CPT

## 2023-01-24 PROCEDURE — 82306 VITAMIN D 25 HYDROXY: CPT

## 2023-01-24 PROCEDURE — 80053 COMPREHEN METABOLIC PANEL: CPT

## 2023-01-24 PROCEDURE — 83735 ASSAY OF MAGNESIUM: CPT

## 2023-01-25 LAB — PARATHYROID HORMONE INTACT: 35 PG/ML (ref 15–65)

## 2023-02-06 ENCOUNTER — HOSPITAL ENCOUNTER (OUTPATIENT)
Age: 83
Discharge: HOME | End: 2023-02-06
Payer: MEDICARE

## 2023-02-06 LAB
25(OH)D3 SERPL-MCNC: 43.61 NG/ML
ALBUMIN SERPL-MCNC: 4.1 GM/DL (ref 3.4–5)
ALP BLD-CCNC: 60 IU/L (ref 40–129)
ALT SERPL-CCNC: 10 U/L (ref 10–40)
ANION GAP SERPL CALCULATED.3IONS-SCNC: 8 MMOL/L (ref 4–16)
AST SERPL-CCNC: 14 IU/L (ref 15–37)
BILIRUB SERPL-MCNC: 0.7 MG/DL (ref 0–1)
BUN SERPL-MCNC: 18 MG/DL (ref 6–23)
CALCIUM SERPL-MCNC: 8.9 MG/DL (ref 8.3–10.6)
CHLORIDE BLD-SCNC: 108 MMOL/L (ref 99–110)
CO2: 28 MMOL/L (ref 21–32)
CREAT SERPL-MCNC: 0.6 MG/DL (ref 0.6–1.1)
GFR SERPL CREATININE-BSD FRML MDRD: >60 ML/MIN/1.73M2
GLUCOSE SERPL-MCNC: 103 MG/DL (ref 70–99)
MAGNESIUM: 2.3 MG/DL (ref 1.8–2.4)
PHOSPHORUS: 3.2 MG/DL (ref 2.5–4.9)
POTASSIUM SERPL-SCNC: 4.3 MMOL/L (ref 3.5–5.1)
SODIUM BLD-SCNC: 144 MMOL/L (ref 135–145)
TOTAL PROTEIN: 6.4 GM/DL (ref 6.4–8.2)

## 2023-02-06 PROCEDURE — 36415 COLL VENOUS BLD VENIPUNCTURE: CPT

## 2023-02-06 PROCEDURE — 83970 ASSAY OF PARATHORMONE: CPT

## 2023-02-06 PROCEDURE — 84100 ASSAY OF PHOSPHORUS: CPT

## 2023-02-06 PROCEDURE — 82306 VITAMIN D 25 HYDROXY: CPT

## 2023-02-06 PROCEDURE — 83735 ASSAY OF MAGNESIUM: CPT

## 2023-02-06 PROCEDURE — 80053 COMPREHEN METABOLIC PANEL: CPT

## 2023-02-07 LAB — PTH-INTACT SERPL-MCNC: 49 PG/ML (ref 15–65)

## 2023-02-27 DIAGNOSIS — G25.0 ESSENTIAL TREMOR: ICD-10-CM

## 2023-02-27 DIAGNOSIS — M80.08XS AGE-RELATED OSTEOPOROSIS WITH CURRENT PATHOLOGICAL FRACTURE, VERTEBRA(E), SEQUELA: ICD-10-CM

## 2023-02-27 DIAGNOSIS — I47.1 SVT (SUPRAVENTRICULAR TACHYCARDIA) (HCC): ICD-10-CM

## 2023-02-27 DIAGNOSIS — I10 ESSENTIAL HYPERTENSION: ICD-10-CM

## 2023-02-27 DIAGNOSIS — M80.08XA AGE-RELATED OSTEOPOROSIS WITH CURRENT PATHOL FRACTURE OF VERTEBRA, INITIAL ENCOUNTER (HCC): ICD-10-CM

## 2023-02-27 RX ORDER — ALBUTEROL SULFATE 90 UG/1
4 AEROSOL, METERED RESPIRATORY (INHALATION) PRN
Status: CANCELLED | OUTPATIENT
Start: 2023-02-28

## 2023-02-27 RX ORDER — METOPROLOL SUCCINATE 50 MG/1
TABLET, EXTENDED RELEASE ORAL
Qty: 90 TABLET | Refills: 3 | OUTPATIENT
Start: 2023-02-27

## 2023-02-27 RX ORDER — SODIUM CHLORIDE 9 MG/ML
INJECTION, SOLUTION INTRAVENOUS CONTINUOUS
Status: CANCELLED | OUTPATIENT
Start: 2023-02-28

## 2023-02-27 RX ORDER — FAMOTIDINE 10 MG/ML
20 INJECTION, SOLUTION INTRAVENOUS
Status: CANCELLED | OUTPATIENT
Start: 2023-02-28

## 2023-02-27 RX ORDER — DIPHENHYDRAMINE HYDROCHLORIDE 50 MG/ML
50 INJECTION INTRAMUSCULAR; INTRAVENOUS
Status: CANCELLED | OUTPATIENT
Start: 2023-02-28

## 2023-02-27 RX ORDER — ACETAMINOPHEN 325 MG/1
650 TABLET ORAL
Status: CANCELLED | OUTPATIENT
Start: 2023-02-28

## 2023-02-27 RX ORDER — ONDANSETRON 2 MG/ML
8 INJECTION INTRAMUSCULAR; INTRAVENOUS
Status: CANCELLED | OUTPATIENT
Start: 2023-02-28

## 2023-02-27 RX ORDER — EPINEPHRINE 1 MG/ML
0.3 INJECTION, SOLUTION, CONCENTRATE INTRAVENOUS PRN
Status: CANCELLED | OUTPATIENT
Start: 2023-02-28

## 2023-03-01 ENCOUNTER — OFFICE VISIT (OUTPATIENT)
Dept: FAMILY MEDICINE CLINIC | Age: 83
End: 2023-03-01
Payer: MEDICARE

## 2023-03-01 VITALS
SYSTOLIC BLOOD PRESSURE: 138 MMHG | BODY MASS INDEX: 25.41 KG/M2 | OXYGEN SATURATION: 95 % | WEIGHT: 121.6 LBS | DIASTOLIC BLOOD PRESSURE: 70 MMHG | HEART RATE: 64 BPM

## 2023-03-01 DIAGNOSIS — I47.1 SVT (SUPRAVENTRICULAR TACHYCARDIA) (HCC): ICD-10-CM

## 2023-03-01 DIAGNOSIS — I10 ESSENTIAL HYPERTENSION: ICD-10-CM

## 2023-03-01 DIAGNOSIS — G25.0 ESSENTIAL TREMOR: ICD-10-CM

## 2023-03-01 PROCEDURE — 3075F SYST BP GE 130 - 139MM HG: CPT | Performed by: FAMILY MEDICINE

## 2023-03-01 PROCEDURE — 1123F ACP DISCUSS/DSCN MKR DOCD: CPT | Performed by: FAMILY MEDICINE

## 2023-03-01 PROCEDURE — 99213 OFFICE O/P EST LOW 20 MIN: CPT | Performed by: FAMILY MEDICINE

## 2023-03-01 PROCEDURE — 3078F DIAST BP <80 MM HG: CPT | Performed by: FAMILY MEDICINE

## 2023-03-01 RX ORDER — METOPROLOL SUCCINATE 50 MG/1
50 TABLET, EXTENDED RELEASE ORAL DAILY
Qty: 90 TABLET | Refills: 1 | Status: SHIPPED | OUTPATIENT
Start: 2023-03-01

## 2023-03-01 ASSESSMENT — ENCOUNTER SYMPTOMS: SHORTNESS OF BREATH: 0

## 2023-03-01 NOTE — PROGRESS NOTES
Patient ID: Kirstin Guerrero 1940    . Chief Complaint   Patient presents with    Hypertension    Hyperlipidemia         Hypertension  This is a chronic problem. The current episode started more than 1 year ago. The problem is unchanged. The problem is controlled. Pertinent negatives include no palpitations or shortness of breath. Risk factors for coronary artery disease include post-menopausal state. Past treatments include beta blockers and diuretics. Hyperlipidemia  This is a chronic problem. The current episode started more than 1 year ago. Pertinent negatives include no shortness of breath. Current antihyperlipidemic treatment includes statins. Urinary Frequency   This is a new problem. The current episode started more than 1 month ago. The problem occurs intermittently (aggravated by coughing or sneezing). The problem has been unchanged. Associated symptoms include frequency and urgency. Treatments tried: vesicare helps.        Patient Active Problem List   Diagnosis    Essential hypertension    Osteoporosis    Hx of colonic polyps    Degenerative disc disease, lumbar    Spondylolisthesis of lumbar region    Osteoarthritis of cervical spine    SVT (supraventricular tachycardia) (HCC)    Chronic left-sided low back pain with left-sided sciatica    Nonrheumatic aortic valve insufficiency    Mitral regurgitation    History of compression fracture of spine    Anticoagulated    Paroxysmal atrial fibrillation (HCC)    Nondisplaced intertrochanteric fracture of left femur, subsequent encounter for closed fracture with routine healing    Bilateral primary osteoarthritis of knee    S/p left hip fracture    Age-related osteoporosis with current pathol fracture of vertebra, initial encounter [facility])       Past Surgical History:   Procedure Laterality Date    CHOLECYSTECTOMY  age 29's    open    COLONOSCOPY  12/2010    DILATION AND CURETTAGE OF UTERUS  age31's    FEMUR FRACTURE SURGERY Left 10/18/2021    FEMUR IM NAIL PB INSERTION performed by Brenda Dang DO at [address]  Right 2014    HERNIA REPAIR      right ing hernia    [address]STERECTOMY ([address])  age 29's    \"took both ovaries\"    TONSILLECTOMY  age 22    VERTEBROPLASTY  8/24/15       Family History   Problem Relation Age of Onset    Heart Attack Mother         Myocardial Infarction- 76    Heart Disease Mother         MI    Diabetes Mother     High Blood Pressure Mother     Heart Disease Father         MI    Diabetes Sister         Type 2    Diabetes Sister     No Known Problems Sister     No Known Problems Sister     No Known Problems Sister     Bone Cancer Sister     Heart Attack Brother     Other Brother         AAA    Heart Disease Brother         MI    Heart Attack Brother     Heart Disease Brother         MI    Heart Attack Brother     Heart Attack Brother     Cancer Brother         unknown    No Known Problems Brother     Other Brother          in 3 luis accident    Heart Disease Brother     Heart Surgery Brother         5 bypasses       Current Outpatient Medications on File Prior to Visit   Medication Sig Dispense Refill    atorvastatin (LIPITOR) 10 MG tablet Take 1 tablet by mouth daily 90 tablet 3    alendronate (FOSAMAX) 70 MG tablet Take 1 tablet by mouth every 7 days 13 tablet 3    solifenacin (VESICARE) 5 MG tablet Take 1 tablet by mouth daily To replace Detrol 90 tablet 3    apixaban (ELIQUIS) 5 MG TABS tablet Take 1 tablet by mouth 2 times daily 60 tablet 0    Ascorbic Acid (VITAMIN C) 500 MG tablet Take 1,000 mg by mouth daily      calcium-vitamin D (OSCAL-500) 500-200 MG-UNIT per tablet Take 1 tablet by mouth daily. HYDROcodone-acetaminophen (NORCO) 5-325 MG per tablet take 1 tablet by mouth every 8 hours AS NEEDED FOR PAIN for up to 7 days (Patient not taking: No sig reported)      lidocaine (LIDODERM) 5 % apply 1 patch TO THE AFFECTED AREA DAILY.  LEAVE ON FOR 12 HOURS AND THEN OFF FOR 12 HOURS. (Patient not taking: Reported on 3/1/2023)      methocarbamol (ROBAXIN) 500 MG tablet take 1 tablet by mouth four times a day (Patient not taking: No sig reported)      fluticasone (FLONASE) 50 MCG/ACT nasal spray 2 sprays by Each Nostril route daily (Patient not taking: Reported on 3/1/2023) 1 Bottle 1     No current facility-administered medications on file prior to visit. Objective:         Physical Exam  Vitals and nursing note reviewed. Constitutional:       Appearance: She is well-developed. HENT:      Head: Normocephalic and atraumatic. Cardiovascular:      Rate and Rhythm: Normal rate and regular rhythm. Heart sounds: Normal heart sounds, S1 normal and S2 normal.   Pulmonary:      Effort: Pulmonary effort is normal. No respiratory distress. Breath sounds: Normal breath sounds. No wheezing. Musculoskeletal:      Cervical back: Neck supple. Skin:     General: Skin is warm and dry. Neurological:      Mental Status: She is alert. Comments: Slight essential tremor     Vitals:    03/01/23 1128   BP: 138/70   Site: Left Upper Arm   Position: Sitting   Cuff Size: Medium Adult   Pulse: 64   SpO2: 95%   Weight: 121 lb 9.6 oz (55.2 kg)     Body mass index is 25.41 kg/m². Wt Readings from Last 3 Encounters:   03/01/23 121 lb 9.6 oz (55.2 kg)   01/19/23 121 lb (54.9 kg)   12/27/22 121 lb 9.6 oz (55.2 kg)     BP Readings from Last 3 Encounters:   03/01/23 138/70   01/19/23 (!) 148/96   12/27/22 122/84          No results found for this visit on 03/01/23. The ASCVD Risk score (Quita DK, et al., 2019) failed to calculate for the following reasons:     The 2019 ASCVD risk score is only valid for ages 36 to 78  [address] [facility] Outpatient Visit on 02/06/2023   Component Date Value    Sodium 02/06/2023 144     Potassium 02/06/2023 4.3     Chloride 02/06/2023 108     CO2 02/06/2023 28     BUN 02/06/2023 18     Creatinine 02/06/2023 0.6     Est, Glom Filt Rate 02/06/2023 >60     Glucose 02/06/2023 103 (A)     Calcium 02/06/2023 8.9     Albumin 02/06/2023 4.1     Total Protein 02/06/2023 6.4     Total Bilirubin 02/06/2023 0.7     ALT 02/06/2023 10     AST 02/06/2023 14 (A)     Alkaline Phosphatase 02/06/2023 60     Anion Gap 02/06/2023 8     Magnesium 02/06/2023 2.3     Phosphorus 02/06/2023 3.2     PTH 02/06/2023 49     Vit D, 25-Hydroxy 02/06/2023 43.61    Hospital Outpatient Visit on 01/24/2023   Component Date Value    Sodium 01/24/2023 140     Potassium 01/24/2023 4.2     Chloride 01/24/2023 102     CO2 01/24/2023 31     BUN 01/24/2023 22     Creatinine 01/24/2023 0.6     Est, Glom Filt Rate 01/24/2023 >60     Glucose 01/24/2023 108 (A)     Calcium 01/24/2023 9.2     Albumin 01/24/2023 4.4     Total Protein 01/24/2023 6.9     Total Bilirubin 01/24/2023 0.8     ALT 01/24/2023 8 (A)     AST 01/24/2023 13 (A)     Alkaline Phosphatase 01/24/2023 76     Anion Gap 01/24/2023 7     Magnesium 01/24/2023 2.3     Phosphorus 01/24/2023 3.5     PTH 01/24/2023 35     Vit D, 25-Hydroxy 01/24/2023 44.93            Assessment:       Diagnosis Orders   1. Essential hypertension  metoprolol succinate (TOPROL XL) 50 MG extended release tablet      2. SVT (supraventricular tachycardia) (HCC)  metoprolol succinate (TOPROL XL) 50 MG extended release tablet      3. Essential tremor  metoprolol succinate (TOPROL XL) 50 MG extended release tablet              Plan:      Hypertension stable. Continue metoprolol    Recheck in 6 months      Return in about 25 weeks (around 8/23/2023) for HTN, Hyperlipid.

## 2023-03-16 PROBLEM — M80.08XS AGE-RELATED OSTEOPOROSIS WITH CURRENT PATHOLOGICAL FRACTURE, VERTEBRA(E), SEQUELA: Status: ACTIVE | Noted: 2023-03-16

## 2023-03-28 ENCOUNTER — HOSPITAL ENCOUNTER (OUTPATIENT)
Dept: INFUSION THERAPY | Age: 83
Setting detail: INFUSION SERIES
Discharge: HOME | End: 2023-03-28
Payer: MEDICARE

## 2023-03-28 VITALS
DIASTOLIC BLOOD PRESSURE: 65 MMHG | RESPIRATION RATE: 16 BRPM | HEART RATE: 61 BPM | TEMPERATURE: 97.2 F | SYSTOLIC BLOOD PRESSURE: 153 MMHG | OXYGEN SATURATION: 99 %

## 2023-03-28 DIAGNOSIS — M80.08XA AGE-RELATED OSTEOPOROSIS WITH CURRENT PATHOL FRACTURE OF VERTEBRA, INITIAL ENCOUNTER (HCC): ICD-10-CM

## 2023-03-28 DIAGNOSIS — M80.08XS AGE-RELATED OSTEOPOROSIS WITH CURRENT PATHOLOGICAL FRACTURE, VERTEBRA(E), SEQUELA: Primary | ICD-10-CM

## 2023-03-28 PROCEDURE — 99211 OFF/OP EST MAY X REQ PHY/QHP: CPT

## 2023-03-28 PROCEDURE — 6360000002 HC RX W HCPCS

## 2023-03-28 PROCEDURE — 96372 THER/PROPH/DIAG INJ SC/IM: CPT

## 2023-03-28 RX ORDER — DIPHENHYDRAMINE HYDROCHLORIDE 50 MG/ML
50 INJECTION INTRAMUSCULAR; INTRAVENOUS
OUTPATIENT
Start: 2023-04-25

## 2023-03-28 RX ORDER — ACETAMINOPHEN 325 MG/1
650 TABLET ORAL
OUTPATIENT
Start: 2023-04-25

## 2023-03-28 RX ORDER — SODIUM CHLORIDE 9 MG/ML
INJECTION, SOLUTION INTRAVENOUS CONTINUOUS
OUTPATIENT
Start: 2023-04-25

## 2023-03-28 RX ORDER — ONDANSETRON 2 MG/ML
8 INJECTION INTRAMUSCULAR; INTRAVENOUS
OUTPATIENT
Start: 2023-04-25

## 2023-03-28 RX ORDER — EPINEPHRINE 1 MG/ML
0.3 INJECTION, SOLUTION, CONCENTRATE INTRAVENOUS PRN
OUTPATIENT
Start: 2023-04-25

## 2023-03-28 RX ORDER — ALBUTEROL SULFATE 90 UG/1
4 AEROSOL, METERED RESPIRATORY (INHALATION) PRN
OUTPATIENT
Start: 2023-04-25

## 2023-03-28 RX ORDER — FAMOTIDINE 10 MG/ML
20 INJECTION, SOLUTION INTRAVENOUS
OUTPATIENT
Start: 2023-04-25

## 2023-03-28 RX ADMIN — ROMOSOZUMAB-AQQG 105 MG: 105 INJECTION, SOLUTION SUBCUTANEOUS at 09:58

## 2023-03-28 NOTE — PROGRESS NOTES
Tolerated injection well. Reviewed discharge instruction, voiced understanding. Copies of AVS given. Pt discharged home. Pt to exit via steady gait.     Orders Placed This Encounter   Medications    romosozumab-aqqg (EVENITY) injection 105 mg    romosozumab-aqqg (EVENITY) injection 105 mg

## 2023-04-25 ENCOUNTER — HOSPITAL ENCOUNTER (OUTPATIENT)
Dept: INFUSION THERAPY | Age: 83
Setting detail: INFUSION SERIES
Discharge: HOME | End: 2023-04-25
Payer: MEDICARE

## 2023-04-25 VITALS
OXYGEN SATURATION: 100 % | HEART RATE: 71 BPM | TEMPERATURE: 97 F | SYSTOLIC BLOOD PRESSURE: 159 MMHG | DIASTOLIC BLOOD PRESSURE: 76 MMHG | RESPIRATION RATE: 16 BRPM

## 2023-04-25 DIAGNOSIS — M80.08XA AGE-RELATED OSTEOPOROSIS WITH CURRENT PATHOL FRACTURE OF VERTEBRA, INITIAL ENCOUNTER (HCC): ICD-10-CM

## 2023-04-25 DIAGNOSIS — M80.08XS AGE-RELATED OSTEOPOROSIS WITH CURRENT PATHOLOGICAL FRACTURE, VERTEBRA(E), SEQUELA: Primary | ICD-10-CM

## 2023-04-25 PROCEDURE — 96372 THER/PROPH/DIAG INJ SC/IM: CPT

## 2023-04-25 PROCEDURE — 6360000002 HC RX W HCPCS

## 2023-04-25 RX ORDER — ONDANSETRON 2 MG/ML
8 INJECTION INTRAMUSCULAR; INTRAVENOUS
OUTPATIENT
Start: 2023-05-23

## 2023-04-25 RX ORDER — EPINEPHRINE 1 MG/ML
0.3 INJECTION, SOLUTION, CONCENTRATE INTRAVENOUS PRN
OUTPATIENT
Start: 2023-05-23

## 2023-04-25 RX ORDER — SODIUM CHLORIDE 9 MG/ML
INJECTION, SOLUTION INTRAVENOUS CONTINUOUS
OUTPATIENT
Start: 2023-05-23

## 2023-04-25 RX ORDER — ACETAMINOPHEN 325 MG/1
650 TABLET ORAL
OUTPATIENT
Start: 2023-05-23

## 2023-04-25 RX ORDER — FAMOTIDINE 10 MG/ML
20 INJECTION, SOLUTION INTRAVENOUS
OUTPATIENT
Start: 2023-05-23

## 2023-04-25 RX ORDER — DIPHENHYDRAMINE HYDROCHLORIDE 50 MG/ML
50 INJECTION INTRAMUSCULAR; INTRAVENOUS
OUTPATIENT
Start: 2023-05-23

## 2023-04-25 RX ORDER — ALBUTEROL SULFATE 90 UG/1
4 AEROSOL, METERED RESPIRATORY (INHALATION) PRN
OUTPATIENT
Start: 2023-05-23

## 2023-04-25 RX ADMIN — ROMOSOZUMAB-AQQG 105 MG: 105 INJECTION, SOLUTION SUBCUTANEOUS at 08:53

## 2023-05-30 ENCOUNTER — HOSPITAL ENCOUNTER (OUTPATIENT)
Dept: INFUSION THERAPY | Age: 83
Setting detail: INFUSION SERIES
Discharge: HOME | End: 2023-05-30
Payer: MEDICARE

## 2023-05-30 VITALS
OXYGEN SATURATION: 98 % | TEMPERATURE: 97.9 F | HEART RATE: 64 BPM | SYSTOLIC BLOOD PRESSURE: 148 MMHG | RESPIRATION RATE: 18 BRPM | DIASTOLIC BLOOD PRESSURE: 71 MMHG

## 2023-05-30 DIAGNOSIS — M80.08XS AGE-RELATED OSTEOPOROSIS WITH CURRENT PATHOLOGICAL FRACTURE, VERTEBRA(E), SEQUELA: Primary | ICD-10-CM

## 2023-05-30 DIAGNOSIS — M80.08XA AGE-RELATED OSTEOPOROSIS WITH CURRENT PATHOL FRACTURE OF VERTEBRA, INITIAL ENCOUNTER (HCC): ICD-10-CM

## 2023-05-30 PROCEDURE — 96372 THER/PROPH/DIAG INJ SC/IM: CPT

## 2023-05-30 PROCEDURE — 6360000002 HC RX W HCPCS

## 2023-05-30 RX ORDER — EPINEPHRINE 1 MG/ML
0.3 INJECTION, SOLUTION, CONCENTRATE INTRAVENOUS PRN
OUTPATIENT
Start: 2023-06-20

## 2023-05-30 RX ORDER — ALBUTEROL SULFATE 90 UG/1
4 AEROSOL, METERED RESPIRATORY (INHALATION) PRN
OUTPATIENT
Start: 2023-06-20

## 2023-05-30 RX ORDER — FAMOTIDINE 10 MG/ML
20 INJECTION, SOLUTION INTRAVENOUS
OUTPATIENT
Start: 2023-06-20

## 2023-05-30 RX ORDER — ACETAMINOPHEN 325 MG/1
650 TABLET ORAL
OUTPATIENT
Start: 2023-06-20

## 2023-05-30 RX ORDER — DIPHENHYDRAMINE HYDROCHLORIDE 50 MG/ML
50 INJECTION INTRAMUSCULAR; INTRAVENOUS
OUTPATIENT
Start: 2023-06-20

## 2023-05-30 RX ORDER — SODIUM CHLORIDE 9 MG/ML
INJECTION, SOLUTION INTRAVENOUS CONTINUOUS
OUTPATIENT
Start: 2023-06-20

## 2023-05-30 RX ORDER — ONDANSETRON 2 MG/ML
8 INJECTION INTRAMUSCULAR; INTRAVENOUS
OUTPATIENT
Start: 2023-06-20

## 2023-05-30 RX ADMIN — ROMOSOZUMAB-AQQG 105 MG: 105 INJECTION, SOLUTION SUBCUTANEOUS at 08:54

## 2023-05-30 NOTE — PROGRESS NOTES
Tolerated injections well. Reviewed discharge instructions, patient verbalized understanding. Copies of AVS given. Patient discharging home, exited unit with steady gait.

## 2023-05-30 NOTE — PROGRESS NOTES
Patient arrived on unit, taken to chair room 1. Oriented to room, call light, and chair/bed controls, verbalized understanding. Pt is aware of and agreeable to POC.

## 2023-06-27 ENCOUNTER — HOSPITAL ENCOUNTER (OUTPATIENT)
Dept: INFUSION THERAPY | Age: 83
Setting detail: INFUSION SERIES
Discharge: HOME | End: 2023-06-27
Payer: MEDICARE

## 2023-06-27 VITALS
DIASTOLIC BLOOD PRESSURE: 68 MMHG | OXYGEN SATURATION: 99 % | TEMPERATURE: 98 F | RESPIRATION RATE: 16 BRPM | HEART RATE: 65 BPM | SYSTOLIC BLOOD PRESSURE: 152 MMHG

## 2023-06-27 DIAGNOSIS — M80.08XS AGE-RELATED OSTEOPOROSIS WITH CURRENT PATHOLOGICAL FRACTURE, VERTEBRA(E), SEQUELA: Primary | ICD-10-CM

## 2023-06-27 DIAGNOSIS — M80.08XA AGE-RELATED OSTEOPOROSIS WITH CURRENT PATHOL FRACTURE OF VERTEBRA, INITIAL ENCOUNTER (HCC): ICD-10-CM

## 2023-06-27 PROCEDURE — 6360000002 HC RX W HCPCS

## 2023-06-27 PROCEDURE — 96372 THER/PROPH/DIAG INJ SC/IM: CPT

## 2023-06-27 RX ORDER — ONDANSETRON 2 MG/ML
8 INJECTION INTRAMUSCULAR; INTRAVENOUS
OUTPATIENT
Start: 2023-07-25

## 2023-06-27 RX ORDER — DIPHENHYDRAMINE HYDROCHLORIDE 50 MG/ML
50 INJECTION INTRAMUSCULAR; INTRAVENOUS
OUTPATIENT
Start: 2023-07-25

## 2023-06-27 RX ORDER — EPINEPHRINE 1 MG/ML
0.3 INJECTION, SOLUTION, CONCENTRATE INTRAVENOUS PRN
OUTPATIENT
Start: 2023-07-25

## 2023-06-27 RX ORDER — FAMOTIDINE 10 MG/ML
20 INJECTION, SOLUTION INTRAVENOUS
OUTPATIENT
Start: 2023-07-25

## 2023-06-27 RX ORDER — ALBUTEROL SULFATE 90 UG/1
4 AEROSOL, METERED RESPIRATORY (INHALATION) PRN
OUTPATIENT
Start: 2023-07-25

## 2023-06-27 RX ORDER — ACETAMINOPHEN 325 MG/1
650 TABLET ORAL
OUTPATIENT
Start: 2023-07-25

## 2023-06-27 RX ORDER — SODIUM CHLORIDE 9 MG/ML
INJECTION, SOLUTION INTRAVENOUS CONTINUOUS
OUTPATIENT
Start: 2023-07-25

## 2023-06-27 RX ADMIN — ROMOSOZUMAB-AQQG 105 MG: 105 INJECTION, SOLUTION SUBCUTANEOUS at 08:55

## 2023-07-25 ENCOUNTER — HOSPITAL ENCOUNTER (OUTPATIENT)
Dept: INFUSION THERAPY | Age: 83
Setting detail: INFUSION SERIES
Discharge: HOME | End: 2023-07-25
Payer: MEDICARE

## 2023-07-25 VITALS
DIASTOLIC BLOOD PRESSURE: 69 MMHG | SYSTOLIC BLOOD PRESSURE: 126 MMHG | HEART RATE: 60 BPM | RESPIRATION RATE: 16 BRPM | OXYGEN SATURATION: 98 %

## 2023-07-25 DIAGNOSIS — M80.08XA AGE-RELATED OSTEOPOROSIS WITH CURRENT PATHOL FRACTURE OF VERTEBRA, INITIAL ENCOUNTER (HCC): ICD-10-CM

## 2023-07-25 DIAGNOSIS — M80.08XS AGE-RELATED OSTEOPOROSIS WITH CURRENT PATHOLOGICAL FRACTURE, VERTEBRA(E), SEQUELA: Primary | ICD-10-CM

## 2023-07-25 PROCEDURE — 6360000002 HC RX W HCPCS

## 2023-07-25 PROCEDURE — 96372 THER/PROPH/DIAG INJ SC/IM: CPT

## 2023-07-25 RX ORDER — EPINEPHRINE 1 MG/ML
0.3 INJECTION, SOLUTION, CONCENTRATE INTRAVENOUS PRN
OUTPATIENT
Start: 2023-08-22

## 2023-07-25 RX ORDER — ACETAMINOPHEN 325 MG/1
650 TABLET ORAL
OUTPATIENT
Start: 2023-08-22

## 2023-07-25 RX ORDER — SODIUM CHLORIDE 9 MG/ML
INJECTION, SOLUTION INTRAVENOUS CONTINUOUS
OUTPATIENT
Start: 2023-08-22

## 2023-07-25 RX ORDER — DIPHENHYDRAMINE HYDROCHLORIDE 50 MG/ML
50 INJECTION INTRAMUSCULAR; INTRAVENOUS
OUTPATIENT
Start: 2023-08-22

## 2023-07-25 RX ORDER — FAMOTIDINE 10 MG/ML
20 INJECTION, SOLUTION INTRAVENOUS
OUTPATIENT
Start: 2023-08-22

## 2023-07-25 RX ORDER — ALBUTEROL SULFATE 90 UG/1
4 AEROSOL, METERED RESPIRATORY (INHALATION) PRN
OUTPATIENT
Start: 2023-08-22

## 2023-07-25 RX ORDER — ONDANSETRON 2 MG/ML
8 INJECTION INTRAMUSCULAR; INTRAVENOUS
OUTPATIENT
Start: 2023-08-22

## 2023-07-25 RX ADMIN — ROMOSOZUMAB-AQQG 105 MG: 105 INJECTION, SOLUTION SUBCUTANEOUS at 09:30

## 2023-07-25 NOTE — PROGRESS NOTES
Ambulatory to unit room 6 for Evenity . Reorientated to unit. Procedure and plan of care explained. Questions answered. Understanding verbalized. Tolerated  well. Reviewed discharge instructions, understanding verbalized. Copies of AVS declined to take home. Patient discharged home with spouse. Down to exit per self.     Orders Placed This Encounter   Medications    romosozumab-aqqg (EVENITY) injection 105 mg    romosozumab-aqqg (EVENITY) injection 105 mg

## 2023-07-25 NOTE — DISCHARGE INSTRUCTIONS
Evenity  You may experience increase bone pain after injection  Take pain medication as directed   May be sore at injection site. Call your doctor should you experience jaw pain or new dental complaints  Continue home meds, diet and activity  Call your Doctor for any specific questions or problems. Thank you for choosing [facility]fusion unit. It is our pleasure to serve you.       Hours 8:00 am - 5:00 pm  Phone Number: [phone]

## 2023-08-23 ENCOUNTER — OFFICE VISIT (OUTPATIENT)
Dept: FAMILY MEDICINE CLINIC | Age: 83
End: 2023-08-23
Payer: MEDICARE

## 2023-08-23 VITALS
HEIGHT: 58 IN | DIASTOLIC BLOOD PRESSURE: 80 MMHG | HEART RATE: 70 BPM | SYSTOLIC BLOOD PRESSURE: 140 MMHG | BODY MASS INDEX: 24.1 KG/M2 | WEIGHT: 114.8 LBS | OXYGEN SATURATION: 97 %

## 2023-08-23 DIAGNOSIS — I47.1 SVT (SUPRAVENTRICULAR TACHYCARDIA) (HCC): ICD-10-CM

## 2023-08-23 DIAGNOSIS — E78.5 HYPERLIPIDEMIA, UNSPECIFIED HYPERLIPIDEMIA TYPE: ICD-10-CM

## 2023-08-23 DIAGNOSIS — R63.4 WEIGHT LOSS: ICD-10-CM

## 2023-08-23 DIAGNOSIS — G25.0 ESSENTIAL TREMOR: ICD-10-CM

## 2023-08-23 DIAGNOSIS — R42 DIZZINESS: ICD-10-CM

## 2023-08-23 DIAGNOSIS — N32.81 OVERACTIVE BLADDER: ICD-10-CM

## 2023-08-23 DIAGNOSIS — I10 ESSENTIAL HYPERTENSION: Primary | ICD-10-CM

## 2023-08-23 LAB
BASOPHILS # BLD: 0 K/UL (ref 0–0.2)
BASOPHILS NFR BLD: 0.7 %
DEPRECATED RDW RBC AUTO: 13.3 % (ref 12.4–15.4)
EOSINOPHIL # BLD: 0.1 K/UL (ref 0–0.6)
EOSINOPHIL NFR BLD: 1 %
HCT VFR BLD AUTO: 40.8 % (ref 36–48)
HGB BLD-MCNC: 14.2 G/DL (ref 12–16)
LYMPHOCYTES # BLD: 1.3 K/UL (ref 1–5.1)
LYMPHOCYTES NFR BLD: 24.9 %
MCH RBC QN AUTO: 33.2 PG (ref 26–34)
MCHC RBC AUTO-ENTMCNC: 34.8 G/DL (ref 31–36)
MCV RBC AUTO: 95.4 FL (ref 80–100)
MONOCYTES # BLD: 0.4 K/UL (ref 0–1.3)
MONOCYTES NFR BLD: 6.8 %
NEUTROPHILS # BLD: 3.4 K/UL (ref 1.7–7.7)
NEUTROPHILS NFR BLD: 66.6 %
PLATELET # BLD AUTO: 146 K/UL (ref 135–450)
PMV BLD AUTO: 9.6 FL (ref 5–10.5)
RBC # BLD AUTO: 4.27 M/UL (ref 4–5.2)
WBC # BLD AUTO: 5.2 K/UL (ref 4–11)

## 2023-08-23 PROCEDURE — 1123F ACP DISCUSS/DSCN MKR DOCD: CPT | Performed by: FAMILY MEDICINE

## 2023-08-23 PROCEDURE — 36415 COLL VENOUS BLD VENIPUNCTURE: CPT | Performed by: FAMILY MEDICINE

## 2023-08-23 PROCEDURE — 3077F SYST BP >= 140 MM HG: CPT | Performed by: FAMILY MEDICINE

## 2023-08-23 PROCEDURE — 93000 ELECTROCARDIOGRAM COMPLETE: CPT | Performed by: FAMILY MEDICINE

## 2023-08-23 PROCEDURE — 99214 OFFICE O/P EST MOD 30 MIN: CPT | Performed by: FAMILY MEDICINE

## 2023-08-23 PROCEDURE — 3078F DIAST BP <80 MM HG: CPT | Performed by: FAMILY MEDICINE

## 2023-08-23 RX ORDER — ATORVASTATIN CALCIUM 10 MG/1
10 TABLET, FILM COATED ORAL DAILY
Qty: 90 TABLET | Refills: 3 | Status: SHIPPED | OUTPATIENT
Start: 2023-08-23

## 2023-08-23 RX ORDER — METOPROLOL SUCCINATE 50 MG/1
50 TABLET, EXTENDED RELEASE ORAL DAILY
Qty: 90 TABLET | Refills: 1 | Status: SHIPPED | OUTPATIENT
Start: 2023-08-23

## 2023-08-23 RX ORDER — DARIFENACIN HYDROBROMIDE 7.5 MG/1
7.5 TABLET, EXTENDED RELEASE ORAL DAILY
Qty: 90 TABLET | Refills: 3 | Status: SHIPPED | OUTPATIENT
Start: 2023-08-23

## 2023-08-23 SDOH — ECONOMIC STABILITY: INCOME INSECURITY: HOW HARD IS IT FOR YOU TO PAY FOR THE VERY BASICS LIKE FOOD, HOUSING, MEDICAL CARE, AND HEATING?: NOT VERY HARD

## 2023-08-23 SDOH — ECONOMIC STABILITY: FOOD INSECURITY: WITHIN THE PAST 12 MONTHS, THE FOOD YOU BOUGHT JUST DIDN'T LAST AND YOU DIDN'T HAVE MONEY TO GET MORE.: NEVER TRUE

## 2023-08-23 SDOH — ECONOMIC STABILITY: FOOD INSECURITY: WITHIN THE PAST 12 MONTHS, YOU WORRIED THAT YOUR FOOD WOULD RUN OUT BEFORE YOU GOT MONEY TO BUY MORE.: NEVER TRUE

## 2023-08-23 SDOH — ECONOMIC STABILITY: HOUSING INSECURITY
IN THE LAST 12 MONTHS, WAS THERE A TIME WHEN YOU DID NOT HAVE A STEADY PLACE TO SLEEP OR SLEPT IN A SHELTER (INCLUDING NOW)?: NO

## 2023-08-23 ASSESSMENT — ENCOUNTER SYMPTOMS
VISUAL CHANGE: 0
SHORTNESS OF BREATH: 0
COUGH: 0

## 2023-08-23 NOTE — PROGRESS NOTES
121 lb (54.9 kg)     BP Readings from Last 3 Encounters:   08/23/23 (!) 140/80   07/25/23 126/69   06/27/23 (!) 152/68          No results found for this visit on 08/23/23. The ASCVD Risk score (Quita DAILEY, et al., 2019) failed to calculate for the following reasons: The 2019 ASCVD risk score is only valid for ages 36 to 78  Lab Review   Lab Results   Component Value Date/Time     02/06/2023 09:30 AM    K 4.3 02/06/2023 09:30 AM     02/06/2023 09:30 AM    CO2 28 02/06/2023 09:30 AM    BUN 18 02/06/2023 09:30 AM    CREATININE 0.6 02/06/2023 09:30 AM    GLUCOSE 103 02/06/2023 09:30 AM    CALCIUM 8.9 02/06/2023 09:30 AM     No results found for: LABA1C   EKG: ST depression in lateral leads. Assessment:       Diagnosis Orders   1. Essential hypertension  metoprolol succinate (TOPROL XL) 50 MG extended release tablet      2. Dizziness  EKG 12 Lead    Comprehensive Metabolic Panel    Sedimentation Rate    TSH with Reflex    Vitamin B12 & Folate    CBC with Auto Differential      3. Weight loss  Comprehensive Metabolic Panel    Sedimentation Rate    TSH with Reflex    Vitamin B12 & Folate    CBC with Auto Differential      4. SVT (supraventricular tachycardia) (HCC)  metoprolol succinate (TOPROL XL) 50 MG extended release tablet      5. Essential tremor  metoprolol succinate (TOPROL XL) 50 MG extended release tablet      6. Hyperlipidemia, unspecified hyperlipidemia type  atorvastatin (LIPITOR) 10 MG tablet      7. Overactive bladder  darifenacin (ENABLEX) 7.5 MG extended release tablet              Plan:      Hypertension stable with metoprolol continue taking. Continue taking this for the SVT and the essential tremor    Due to side effects of Detrol, will switch to Enablex for her overactive bladder    Dizziness seems benign. EKG with changes in the lateral leads. We will send a copy of this to her cardiologist.              Return in about 6 months (around 2/23/2024) for HTN.

## 2023-08-24 LAB
ALBUMIN SERPL-MCNC: 4.5 G/DL (ref 3.4–5)
ALBUMIN/GLOB SERPL: 2.1 {RATIO} (ref 1.1–2.2)
ALP SERPL-CCNC: 82 U/L (ref 40–129)
ALT SERPL-CCNC: 13 U/L (ref 10–40)
ANION GAP SERPL CALCULATED.3IONS-SCNC: 14 MMOL/L (ref 3–16)
AST SERPL-CCNC: 18 U/L (ref 15–37)
BILIRUB SERPL-MCNC: 0.8 MG/DL (ref 0–1)
BUN SERPL-MCNC: 16 MG/DL (ref 7–20)
CALCIUM SERPL-MCNC: 9.1 MG/DL (ref 8.3–10.6)
CHLORIDE SERPL-SCNC: 105 MMOL/L (ref 99–110)
CO2 SERPL-SCNC: 24 MMOL/L (ref 21–32)
CREAT SERPL-MCNC: 0.7 MG/DL (ref 0.6–1.2)
ERYTHROCYTE [SEDIMENTATION RATE] IN BLOOD BY WESTERGREN METHOD: 4 MM/HR (ref 0–30)
FOLATE SERPL-MCNC: 12.81 NG/ML (ref 4.78–24.2)
GFR SERPLBLD CREATININE-BSD FMLA CKD-EPI: >60 ML/MIN/{1.73_M2}
GLUCOSE SERPL-MCNC: 90 MG/DL (ref 70–99)
POTASSIUM SERPL-SCNC: 4.9 MMOL/L (ref 3.5–5.1)
PROT SERPL-MCNC: 6.6 G/DL (ref 6.4–8.2)
SODIUM SERPL-SCNC: 143 MMOL/L (ref 136–145)
TSH SERPL DL<=0.005 MIU/L-ACNC: 1.06 UIU/ML (ref 0.27–4.2)
VIT B12 SERPL-MCNC: >2000 PG/ML (ref 211–911)

## 2023-08-28 ENCOUNTER — TELEPHONE (OUTPATIENT)
Dept: FAMILY MEDICINE CLINIC | Age: 83
End: 2023-08-28

## 2023-08-28 DIAGNOSIS — N32.81 OVERACTIVE BLADDER: Primary | ICD-10-CM

## 2023-08-28 RX ORDER — OXYBUTYNIN CHLORIDE 5 MG/1
5 TABLET, EXTENDED RELEASE ORAL DAILY
Qty: 90 TABLET | Refills: 1 | Status: SHIPPED | OUTPATIENT
Start: 2023-08-28

## 2023-08-28 NOTE — TELEPHONE ENCOUNTER
Medication Darifenacin not covered by [employer]   Other alternative as follows      1. Oxybutynin Chloride 5mg     2. Oxybutynin Chloride ER 5 mg, 10mg and 15 mg     3. Trospium chloride  20 mg     4.  Tolterodine ER 2mg and 4 mg

## 2023-08-29 ENCOUNTER — HOSPITAL ENCOUNTER (OUTPATIENT)
Dept: INFUSION THERAPY | Age: 83
Setting detail: INFUSION SERIES
Discharge: HOME | End: 2023-08-29
Payer: MEDICARE

## 2023-08-29 VITALS
SYSTOLIC BLOOD PRESSURE: 128 MMHG | RESPIRATION RATE: 16 BRPM | HEART RATE: 58 BPM | OXYGEN SATURATION: 98 % | DIASTOLIC BLOOD PRESSURE: 67 MMHG

## 2023-08-29 DIAGNOSIS — M80.08XS AGE-RELATED OSTEOPOROSIS WITH CURRENT PATHOLOGICAL FRACTURE, VERTEBRA(E), SEQUELA: Primary | ICD-10-CM

## 2023-08-29 DIAGNOSIS — M80.08XA AGE-RELATED OSTEOPOROSIS WITH CURRENT PATHOL FRACTURE OF VERTEBRA, INITIAL ENCOUNTER (HCC): ICD-10-CM

## 2023-08-29 PROCEDURE — 6360000002 HC RX W HCPCS

## 2023-08-29 PROCEDURE — 96372 THER/PROPH/DIAG INJ SC/IM: CPT

## 2023-08-29 RX ORDER — DIPHENHYDRAMINE HYDROCHLORIDE 50 MG/ML
50 INJECTION INTRAMUSCULAR; INTRAVENOUS
OUTPATIENT
Start: 2023-09-19

## 2023-08-29 RX ORDER — ACETAMINOPHEN 325 MG/1
650 TABLET ORAL
OUTPATIENT
Start: 2023-09-19

## 2023-08-29 RX ORDER — FAMOTIDINE 10 MG/ML
20 INJECTION, SOLUTION INTRAVENOUS
OUTPATIENT
Start: 2023-09-19

## 2023-08-29 RX ORDER — ONDANSETRON 2 MG/ML
8 INJECTION INTRAMUSCULAR; INTRAVENOUS
OUTPATIENT
Start: 2023-09-19

## 2023-08-29 RX ORDER — EPINEPHRINE 1 MG/ML
0.3 INJECTION, SOLUTION, CONCENTRATE INTRAVENOUS PRN
OUTPATIENT
Start: 2023-09-19

## 2023-08-29 RX ORDER — ALBUTEROL SULFATE 90 UG/1
4 AEROSOL, METERED RESPIRATORY (INHALATION) PRN
OUTPATIENT
Start: 2023-09-19

## 2023-08-29 RX ORDER — SODIUM CHLORIDE 9 MG/ML
INJECTION, SOLUTION INTRAVENOUS CONTINUOUS
OUTPATIENT
Start: 2023-09-19

## 2023-08-29 RX ADMIN — ROMOSOZUMAB-AQQG 105 MG: 105 INJECTION, SOLUTION SUBCUTANEOUS at 09:20

## 2023-08-29 NOTE — DISCHARGE INSTRUCTIONS
Evenity                   You may experience increase bone pain after injection  Take pain medication as directed   May be sore at injection site. Call your doctor should you experience jaw pain or new dental complaints  Continue home meds, diet and activity  Call your Doctor for any specific questions or problems. Thank you for choosing [facility] Outpatient Infusion unit. It is our pleasure to serve you.       Hours 8:00 am - 5:00 pm  Phone Number: [phone]

## 2023-08-29 NOTE — PROGRESS NOTES
Ambulatory to unit room 6 for EVENITY. Orientated to unit. Procedure and plan of care explained. Questions answered. Understanding verbalized. Tolerated  well. Reviewed discharge instructions, understanding verbalized. Copies of AVS declined to take home. Patient discharged home with spouse. Down to exit per self.     Orders Placed This Encounter   Medications    romosozumab-aqqg (EVENITY) injection 105 mg    romosozumab-aqqg (EVENITY) injection 105 mg

## 2023-10-03 ENCOUNTER — HOSPITAL ENCOUNTER (OUTPATIENT)
Dept: INFUSION THERAPY | Age: 83
Setting detail: INFUSION SERIES
Discharge: HOME | End: 2023-10-03
Payer: MEDICARE

## 2023-10-03 VITALS
RESPIRATION RATE: 16 BRPM | HEART RATE: 64 BPM | DIASTOLIC BLOOD PRESSURE: 56 MMHG | OXYGEN SATURATION: 98 % | TEMPERATURE: 98.1 F | SYSTOLIC BLOOD PRESSURE: 128 MMHG

## 2023-10-03 DIAGNOSIS — M80.08XA AGE-RELATED OSTEOPOROSIS WITH CURRENT PATHOL FRACTURE OF VERTEBRA, INITIAL ENCOUNTER (HCC): ICD-10-CM

## 2023-10-03 DIAGNOSIS — M80.08XS AGE-RELATED OSTEOPOROSIS WITH CURRENT PATHOLOGICAL FRACTURE, VERTEBRA(E), SEQUELA: Primary | ICD-10-CM

## 2023-10-03 PROCEDURE — 96372 THER/PROPH/DIAG INJ SC/IM: CPT

## 2023-10-03 PROCEDURE — 6360000002 HC RX W HCPCS

## 2023-10-03 RX ORDER — FAMOTIDINE 10 MG/ML
20 INJECTION, SOLUTION INTRAVENOUS
OUTPATIENT
Start: 2023-10-24

## 2023-10-03 RX ORDER — ONDANSETRON 2 MG/ML
8 INJECTION INTRAMUSCULAR; INTRAVENOUS
OUTPATIENT
Start: 2023-10-24

## 2023-10-03 RX ORDER — ALBUTEROL SULFATE 90 UG/1
4 AEROSOL, METERED RESPIRATORY (INHALATION) PRN
OUTPATIENT
Start: 2023-10-24

## 2023-10-03 RX ORDER — SODIUM CHLORIDE 9 MG/ML
INJECTION, SOLUTION INTRAVENOUS CONTINUOUS
OUTPATIENT
Start: 2023-10-24

## 2023-10-03 RX ORDER — ACETAMINOPHEN 325 MG/1
650 TABLET ORAL
OUTPATIENT
Start: 2023-10-24

## 2023-10-03 RX ORDER — EPINEPHRINE 1 MG/ML
0.3 INJECTION, SOLUTION, CONCENTRATE INTRAVENOUS PRN
OUTPATIENT
Start: 2023-10-24

## 2023-10-03 RX ORDER — DIPHENHYDRAMINE HYDROCHLORIDE 50 MG/ML
50 INJECTION INTRAMUSCULAR; INTRAVENOUS
OUTPATIENT
Start: 2023-10-24

## 2023-10-03 RX ADMIN — ROMOSOZUMAB-AQQG 105 MG: 105 INJECTION, SOLUTION SUBCUTANEOUS at 09:35

## 2023-10-03 NOTE — PROGRESS NOTES
Prior to discharge, the After Visit Summary Discharge Instructions were reviewed with the patient. She was offered a printed version of the AVS, but declined the offer. Recurrent appointment, pt tolerated infusion well. Pt discharged home. Pt to exit via steady gait.     Orders Placed This Encounter   Medications    romosozumab-aqqg (EVENITY) injection 105 mg    romosozumab-aqqg (EVENITY) injection 105 mg

## 2023-10-31 ENCOUNTER — HOSPITAL ENCOUNTER (OUTPATIENT)
Dept: INFUSION THERAPY | Age: 83
Setting detail: INFUSION SERIES
Discharge: HOME | End: 2023-10-31
Payer: MEDICARE

## 2023-10-31 VITALS
SYSTOLIC BLOOD PRESSURE: 129 MMHG | TEMPERATURE: 98.3 F | DIASTOLIC BLOOD PRESSURE: 64 MMHG | OXYGEN SATURATION: 98 % | HEART RATE: 66 BPM

## 2023-10-31 DIAGNOSIS — M80.08XA AGE-RELATED OSTEOPOROSIS WITH CURRENT PATHOL FRACTURE OF VERTEBRA, INITIAL ENCOUNTER (HCC): ICD-10-CM

## 2023-10-31 DIAGNOSIS — M80.08XS AGE-RELATED OSTEOPOROSIS WITH CURRENT PATHOLOGICAL FRACTURE, VERTEBRA(E), SEQUELA: Primary | ICD-10-CM

## 2023-10-31 PROCEDURE — 6360000002 HC RX W HCPCS

## 2023-10-31 PROCEDURE — 96372 THER/PROPH/DIAG INJ SC/IM: CPT

## 2023-10-31 RX ORDER — ALBUTEROL SULFATE 90 UG/1
4 AEROSOL, METERED RESPIRATORY (INHALATION) PRN
OUTPATIENT
Start: 2023-11-28

## 2023-10-31 RX ORDER — ONDANSETRON 2 MG/ML
8 INJECTION INTRAMUSCULAR; INTRAVENOUS
OUTPATIENT
Start: 2023-11-28

## 2023-10-31 RX ORDER — DIPHENHYDRAMINE HYDROCHLORIDE 50 MG/ML
50 INJECTION INTRAMUSCULAR; INTRAVENOUS
OUTPATIENT
Start: 2023-11-28

## 2023-10-31 RX ORDER — EPINEPHRINE 1 MG/ML
0.3 INJECTION, SOLUTION, CONCENTRATE INTRAVENOUS PRN
OUTPATIENT
Start: 2023-11-28

## 2023-10-31 RX ORDER — SODIUM CHLORIDE 9 MG/ML
INJECTION, SOLUTION INTRAVENOUS CONTINUOUS
OUTPATIENT
Start: 2023-11-28

## 2023-10-31 RX ORDER — FAMOTIDINE 10 MG/ML
20 INJECTION, SOLUTION INTRAVENOUS
OUTPATIENT
Start: 2023-11-28

## 2023-10-31 RX ORDER — ACETAMINOPHEN 325 MG/1
650 TABLET ORAL
OUTPATIENT
Start: 2023-11-28

## 2023-10-31 RX ADMIN — ROMOSOZUMAB-AQQG 105 MG: 105 INJECTION, SOLUTION SUBCUTANEOUS at 09:20

## 2023-10-31 NOTE — PROGRESS NOTES
Ambulatory to unit room 6 for EVENITY. Reorientated to unit. Procedure and plan of care explained. Questions answered. Understanding verbalized. Tolerated  well. Reviewed discharge instructions, understanding verbalized. Copies of AVS declined to take home. Patient discharged home with spoue. Down to exit per self.     Orders Placed This Encounter   Medications    romosozumab-aqqg (EVENITY) injection 105 mg    romosozumab-aqqg (EVENITY) injection 105 mg

## 2023-10-31 NOTE — DISCHARGE INSTRUCTIONS
EVENITY  You may experience increase bone pain after injection  Take pain medication as directed   May be sore at injection site. Call your doctor should you experience jaw pain or new dental complaints  Continue home meds, diet and activity  Call your Doctor for any specific questions or problems. Thank you for choosing [facility] Outpatient Infusion unit. It is our pleasure to serve you.       Hours 8:00 am - 5:00 pm  Phone Number: [phone]

## 2023-11-08 ENCOUNTER — NURSE ONLY (OUTPATIENT)
Dept: FAMILY MEDICINE CLINIC | Age: 83
End: 2023-11-08

## 2023-11-08 DIAGNOSIS — Z23 NEEDS FLU SHOT: Primary | ICD-10-CM

## 2023-11-28 ENCOUNTER — HOSPITAL ENCOUNTER (OUTPATIENT)
Dept: INFUSION THERAPY | Age: 83
Setting detail: INFUSION SERIES
Discharge: HOME | End: 2023-11-28
Payer: MEDICARE

## 2023-11-28 VITALS
TEMPERATURE: 97.7 F | DIASTOLIC BLOOD PRESSURE: 59 MMHG | OXYGEN SATURATION: 99 % | HEART RATE: 60 BPM | RESPIRATION RATE: 16 BRPM | SYSTOLIC BLOOD PRESSURE: 154 MMHG

## 2023-11-28 DIAGNOSIS — M80.08XS AGE-RELATED OSTEOPOROSIS WITH CURRENT PATHOLOGICAL FRACTURE, VERTEBRA(E), SEQUELA: Primary | ICD-10-CM

## 2023-11-28 DIAGNOSIS — M80.08XA AGE-RELATED OSTEOPOROSIS WITH CURRENT PATHOL FRACTURE OF VERTEBRA, INITIAL ENCOUNTER (HCC): ICD-10-CM

## 2023-11-28 PROCEDURE — 96372 THER/PROPH/DIAG INJ SC/IM: CPT

## 2023-11-28 PROCEDURE — 6360000002 HC RX W HCPCS

## 2023-11-28 RX ORDER — ALBUTEROL SULFATE 90 UG/1
4 AEROSOL, METERED RESPIRATORY (INHALATION) PRN
OUTPATIENT
Start: 2023-12-26

## 2023-11-28 RX ORDER — EPINEPHRINE 1 MG/ML
0.3 INJECTION, SOLUTION, CONCENTRATE INTRAVENOUS PRN
OUTPATIENT
Start: 2023-12-26

## 2023-11-28 RX ORDER — ONDANSETRON 2 MG/ML
8 INJECTION INTRAMUSCULAR; INTRAVENOUS
OUTPATIENT
Start: 2023-12-26

## 2023-11-28 RX ORDER — ACETAMINOPHEN 325 MG/1
650 TABLET ORAL
OUTPATIENT
Start: 2023-12-26

## 2023-11-28 RX ORDER — SODIUM CHLORIDE 9 MG/ML
INJECTION, SOLUTION INTRAVENOUS CONTINUOUS
OUTPATIENT
Start: 2023-12-26

## 2023-11-28 RX ORDER — DIPHENHYDRAMINE HYDROCHLORIDE 50 MG/ML
50 INJECTION INTRAMUSCULAR; INTRAVENOUS
OUTPATIENT
Start: 2023-12-26

## 2023-11-28 RX ORDER — FAMOTIDINE 10 MG/ML
20 INJECTION, SOLUTION INTRAVENOUS
OUTPATIENT
Start: 2023-12-26

## 2023-11-28 RX ADMIN — ROMOSOZUMAB-AQQG 105 MG: 105 INJECTION, SOLUTION SUBCUTANEOUS at 09:25

## 2023-11-28 NOTE — DISCHARGE INSTRUCTIONS
EVENITY. You may experience increase bone pain after injection  Take pain medication as directed   May be sore at injection site. Call your doctor should you experience jaw pain or new dental complaints  Continue home meds, diet and activity  Call your Doctor for any specific questions or problems. Thank you for choosing [facility] Outpatient Infusion unit. It is our pleasure to serve you.       Hours 8:00 am - 5:00 pm  Phone Number: [phone]

## 2023-11-28 NOTE — PROGRESS NOTES
Ambulatory to unit room 3 for Evenity. Reorientated to unit. Procedure and plan of care explained. Questions answered. Understanding verbalized. Tolerated  well. Reviewed discharge instructions, understanding verbalized. Copies of AVS given to take home. Patient discharged home with spouse. Down to exit per self.     Orders Placed This Encounter   Medications    romosozumab-aqqg (EVENITY) injection 105 mg    romosozumab-aqqg (EVENITY) injection 105 mg

## 2023-12-26 ENCOUNTER — HOSPITAL ENCOUNTER (OUTPATIENT)
Dept: INFUSION THERAPY | Age: 83
Setting detail: INFUSION SERIES
Discharge: HOME | End: 2023-12-26
Payer: MEDICARE

## 2023-12-26 VITALS
SYSTOLIC BLOOD PRESSURE: 144 MMHG | OXYGEN SATURATION: 98 % | RESPIRATION RATE: 16 BRPM | HEART RATE: 60 BPM | TEMPERATURE: 98.2 F | DIASTOLIC BLOOD PRESSURE: 64 MMHG

## 2023-12-26 DIAGNOSIS — M80.08XS AGE-RELATED OSTEOPOROSIS WITH CURRENT PATHOLOGICAL FRACTURE, VERTEBRA(E), SEQUELA: ICD-10-CM

## 2023-12-26 DIAGNOSIS — M80.08XA AGE-RELATED OSTEOPOROSIS WITH CURRENT PATHOL FRACTURE OF VERTEBRA, INITIAL ENCOUNTER (HCC): Primary | ICD-10-CM

## 2023-12-26 PROCEDURE — 6360000002 HC RX W HCPCS

## 2023-12-26 PROCEDURE — 96372 THER/PROPH/DIAG INJ SC/IM: CPT

## 2023-12-26 RX ORDER — FAMOTIDINE 10 MG/ML
20 INJECTION, SOLUTION INTRAVENOUS
OUTPATIENT
Start: 2024-01-23

## 2023-12-26 RX ORDER — ACETAMINOPHEN 325 MG/1
650 TABLET ORAL
OUTPATIENT
Start: 2024-01-23

## 2023-12-26 RX ORDER — ONDANSETRON 2 MG/ML
8 INJECTION INTRAMUSCULAR; INTRAVENOUS
OUTPATIENT
Start: 2024-01-23

## 2023-12-26 RX ORDER — SODIUM CHLORIDE 9 MG/ML
INJECTION, SOLUTION INTRAVENOUS CONTINUOUS
OUTPATIENT
Start: 2024-01-23

## 2023-12-26 RX ORDER — EPINEPHRINE 1 MG/ML
0.3 INJECTION, SOLUTION, CONCENTRATE INTRAVENOUS PRN
OUTPATIENT
Start: 2024-01-23

## 2023-12-26 RX ORDER — DIPHENHYDRAMINE HYDROCHLORIDE 50 MG/ML
50 INJECTION INTRAMUSCULAR; INTRAVENOUS
OUTPATIENT
Start: 2024-01-23

## 2023-12-26 RX ORDER — ALBUTEROL SULFATE 90 UG/1
4 AEROSOL, METERED RESPIRATORY (INHALATION) PRN
OUTPATIENT
Start: 2024-01-23

## 2023-12-26 RX ADMIN — ROMOSOZUMAB-AQQG 105 MG: 105 INJECTION, SOLUTION SUBCUTANEOUS at 09:23

## 2024-01-03 ENCOUNTER — TELEMEDICINE (OUTPATIENT)
Dept: FAMILY MEDICINE CLINIC | Age: 84
End: 2024-01-03
Payer: MEDICARE

## 2024-01-03 DIAGNOSIS — Z00.00 MEDICARE ANNUAL WELLNESS VISIT, SUBSEQUENT: Primary | ICD-10-CM

## 2024-01-03 PROCEDURE — 1123F ACP DISCUSS/DSCN MKR DOCD: CPT | Performed by: FAMILY MEDICINE

## 2024-01-03 PROCEDURE — G0439 PPPS, SUBSEQ VISIT: HCPCS | Performed by: FAMILY MEDICINE

## 2024-01-03 ASSESSMENT — PATIENT HEALTH QUESTIONNAIRE - PHQ9
SUM OF ALL RESPONSES TO PHQ QUESTIONS 1-9: 0
SUM OF ALL RESPONSES TO PHQ QUESTIONS 1-9: 0
1. LITTLE INTEREST OR PLEASURE IN DOING THINGS: 0
SUM OF ALL RESPONSES TO PHQ QUESTIONS 1-9: 0
SUM OF ALL RESPONSES TO PHQ9 QUESTIONS 1 & 2: 0
2. FEELING DOWN, DEPRESSED OR HOPELESS: 0
SUM OF ALL RESPONSES TO PHQ QUESTIONS 1-9: 0

## 2024-01-03 NOTE — PROGRESS NOTES
Medicare Annual Wellness Visit    Marta Stahl is here for Medicare AWV    Assessment & Plan   Medicare annual wellness visit, subsequent  Recommendations for Preventive Services Due: see orders and patient instructions/AVS.  Recommended screening schedule for the next 5-10 years is provided to the patient in written form: see Patient Instructions/AVS.     No follow-ups on file.     Subjective       Patient's complete Health Risk Assessment and screening values have been reviewed and are found in Flowsheets. The following problems were reviewed today and where indicated follow up appointments were made and/or referrals ordered.    Positive Risk Factor Screenings with Interventions:    Fall Risk:  Do you feel unsteady or are you worried about falling? : (!) yes (sometimes, has cane/walker prn)  2 or more falls in past year?: no  Fall with injury in past year?: no     Interventions:    Patient comments: patient states she will sometimes feel unsteady and has a cane or walker she will use as needed.  Reviewed medications, home hazards, visual acuity, and co-morbidities that can increase risk for falls  Patient declines any further evaluation or treatment                Vision Screen:  Do you have difficulty driving, watching TV, or doing any of your daily activities because of your eyesight?: No  Have you had an eye exam within the past year?: (!) No (needs)  No results found.    Interventions:   Patient comments: states she received her notice that she is due for an eye exam, and will be making an appointment.  Patient encouraged to make appointment with their eye specialist    Safety:  Do you always fasten your seatbelt when you are in a car?: (!) No  Interventions:  Patient comments: patient states she does not always wear her seatbelt and encouraged her to please do so for her safety.     Advanced Directives:  Do you have a Living Will?: (!) No    Intervention:  has NO advanced directive - information provided

## 2024-01-03 NOTE — PATIENT INSTRUCTIONS
Personalized Preventive Plan for Marta Stahl - 1/3/2024  Medicare offers a range of preventive health benefits. Some of the tests and screenings are paid in full while other may be subject to a deductible, co-insurance, and/or copay.    Some of these benefits include a comprehensive review of your medical history including lifestyle, illnesses that may run in your family, and various assessments and screenings as appropriate.    After reviewing your medical record and screening and assessments performed today your provider may have ordered immunizations, labs, imaging, and/or referrals for you.  A list of these orders (if applicable) as well as your Preventive Care list are included within your After Visit Summary for your review.    Other Preventive Recommendations:    A preventive eye exam performed by an eye specialist is recommended every 1-2 years to screen for glaucoma; cataracts, macular degeneration, and other eye disorders.  A preventive dental visit is recommended every 6 months.  Try to get at least 150 minutes of exercise per week or 10,000 steps per day on a pedometer .  Order or download the FREE \"Exercise & Physical Activity: Your Everyday Guide\" from [employer]. Call 1-586.626.2373 or search [employer] online.  You need 1076-3102 mg of calcium and 9713-3828 IU of vitamin D per day. It is possible to meet your calcium requirement with diet alone, but a vitamin D supplement is usually necessary to meet this goal.  When exposed to the sun, use a sunscreen that protects against both UVA and UVB radiation with an SPF of 30 or greater. Reapply every 2 to 3 hours or after sweating, drying off with a towel, or swimming.  Always wear a seat belt when traveling in a car. Always wear a helmet when riding a bicycle or motorcycle.

## 2024-01-23 ENCOUNTER — HOSPITAL ENCOUNTER (OUTPATIENT)
Dept: INFUSION THERAPY | Age: 84
Setting detail: INFUSION SERIES
Discharge: HOME | End: 2024-01-23
Payer: MEDICARE

## 2024-01-23 VITALS
HEART RATE: 58 BPM | RESPIRATION RATE: 16 BRPM | SYSTOLIC BLOOD PRESSURE: 158 MMHG | DIASTOLIC BLOOD PRESSURE: 70 MMHG | TEMPERATURE: 97.5 F | OXYGEN SATURATION: 97 %

## 2024-01-23 DIAGNOSIS — M80.08XA AGE-RELATED OSTEOPOROSIS WITH CURRENT PATHOL FRACTURE OF VERTEBRA, INITIAL ENCOUNTER (HCC): Primary | ICD-10-CM

## 2024-01-23 DIAGNOSIS — M80.08XS AGE-RELATED OSTEOPOROSIS WITH CURRENT PATHOLOGICAL FRACTURE, VERTEBRA(E), SEQUELA: ICD-10-CM

## 2024-01-23 PROCEDURE — 96372 THER/PROPH/DIAG INJ SC/IM: CPT

## 2024-01-23 PROCEDURE — 6360000002 HC RX W HCPCS

## 2024-01-23 RX ORDER — EPINEPHRINE 1 MG/ML
0.3 INJECTION, SOLUTION, CONCENTRATE INTRAVENOUS PRN
OUTPATIENT
Start: 2024-02-20

## 2024-01-23 RX ORDER — FAMOTIDINE 10 MG/ML
20 INJECTION, SOLUTION INTRAVENOUS
OUTPATIENT
Start: 2024-02-20

## 2024-01-23 RX ORDER — ONDANSETRON 2 MG/ML
8 INJECTION INTRAMUSCULAR; INTRAVENOUS
OUTPATIENT
Start: 2024-02-20

## 2024-01-23 RX ORDER — ALBUTEROL SULFATE 90 UG/1
4 AEROSOL, METERED RESPIRATORY (INHALATION) PRN
OUTPATIENT
Start: 2024-02-20

## 2024-01-23 RX ORDER — DIPHENHYDRAMINE HYDROCHLORIDE 50 MG/ML
50 INJECTION INTRAMUSCULAR; INTRAVENOUS
OUTPATIENT
Start: 2024-02-20

## 2024-01-23 RX ORDER — ACETAMINOPHEN 325 MG/1
650 TABLET ORAL
OUTPATIENT
Start: 2024-02-20

## 2024-01-23 RX ORDER — SODIUM CHLORIDE 9 MG/ML
INJECTION, SOLUTION INTRAVENOUS CONTINUOUS
OUTPATIENT
Start: 2024-02-20

## 2024-01-23 RX ADMIN — ROMOSOZUMAB-AQQG 105 MG: 105 INJECTION, SOLUTION SUBCUTANEOUS at 09:25

## 2024-01-23 NOTE — PROGRESS NOTES
Ambulatory to unit room 4 for Evenity.Reorientated to unit.Procedure and plan of care explained.Questions answered.Understanding verbalized.Tolerated  well.Reviewed discharge instructions, understanding verbalized.Copies of AVS declined to take home. Patient discharged home with spouse.Down to exit per self.    Orders Placed This Encounter   Medications    romosozumab-aqqg (EVENITY) injection 105 mg    romosozumab-aqqg (EVENITY) injection 105 mg

## 2024-01-23 NOTE — DISCHARGE INSTRUCTIONS
EVENITY  Increase liquid intake for the next 2-3 days, especially water.  You may experience increase bone pain after injection  Take pain medication as directed   May be sore at injection site.  Call your doctor should you experience jaw pain or new dental complaints  Continue home meds, diet and activity  Call your Doctor for any specific questions or problems.    Thank you for choosing [facility] Outpatient Infusion unit. It is our pleasure to serve you.      Hours 8:00 am - 5:00 pm  Phone Number: [phone]

## 2024-02-20 ENCOUNTER — HOSPITAL ENCOUNTER (OUTPATIENT)
Dept: INFUSION THERAPY | Age: 84
Setting detail: INFUSION SERIES
Discharge: HOME | End: 2024-02-20
Payer: MEDICARE

## 2024-02-20 VITALS
DIASTOLIC BLOOD PRESSURE: 62 MMHG | SYSTOLIC BLOOD PRESSURE: 134 MMHG | HEART RATE: 54 BPM | RESPIRATION RATE: 16 BRPM | TEMPERATURE: 97 F | OXYGEN SATURATION: 97 %

## 2024-02-20 DIAGNOSIS — M80.08XS AGE-RELATED OSTEOPOROSIS WITH CURRENT PATHOLOGICAL FRACTURE, VERTEBRA(E), SEQUELA: ICD-10-CM

## 2024-02-20 DIAGNOSIS — M80.08XA AGE-RELATED OSTEOPOROSIS WITH CURRENT PATHOL FRACTURE OF VERTEBRA, INITIAL ENCOUNTER (HCC): Primary | ICD-10-CM

## 2024-02-20 PROCEDURE — 6360000002 HC RX W HCPCS

## 2024-02-20 PROCEDURE — 96372 THER/PROPH/DIAG INJ SC/IM: CPT

## 2024-02-20 RX ORDER — ONDANSETRON 2 MG/ML
8 INJECTION INTRAMUSCULAR; INTRAVENOUS
Status: CANCELLED | OUTPATIENT
Start: 2024-02-20

## 2024-02-20 RX ORDER — DIPHENHYDRAMINE HYDROCHLORIDE 50 MG/ML
50 INJECTION INTRAMUSCULAR; INTRAVENOUS
Status: CANCELLED | OUTPATIENT
Start: 2024-02-20

## 2024-02-20 RX ORDER — SODIUM CHLORIDE 9 MG/ML
INJECTION, SOLUTION INTRAVENOUS CONTINUOUS
Status: CANCELLED | OUTPATIENT
Start: 2024-02-20

## 2024-02-20 RX ORDER — FAMOTIDINE 10 MG/ML
20 INJECTION, SOLUTION INTRAVENOUS
Status: CANCELLED | OUTPATIENT
Start: 2024-02-20

## 2024-02-20 RX ORDER — EPINEPHRINE 1 MG/ML
0.3 INJECTION, SOLUTION, CONCENTRATE INTRAVENOUS PRN
Status: CANCELLED | OUTPATIENT
Start: 2024-02-20

## 2024-02-20 RX ORDER — ACETAMINOPHEN 325 MG/1
650 TABLET ORAL
Status: CANCELLED | OUTPATIENT
Start: 2024-02-20

## 2024-02-20 RX ORDER — ALBUTEROL SULFATE 90 UG/1
4 AEROSOL, METERED RESPIRATORY (INHALATION) PRN
Status: CANCELLED | OUTPATIENT
Start: 2024-02-20

## 2024-02-20 RX ADMIN — ROMOSOZUMAB-AQQG 105 MG: 105 INJECTION, SOLUTION SUBCUTANEOUS at 09:30

## 2024-02-20 RX ADMIN — ROMOSOZUMAB-AQQG 105 MG: 105 INJECTION, SOLUTION SUBCUTANEOUS at 09:29

## 2024-02-20 NOTE — DISCHARGE INSTRUCTIONS
EVENITY.  You may experience increase bone pain after injection  Take pain medication as directed   May be sore at injection site.  Call your doctor should you experience jaw pain or new dental complaints  Continue home meds, diet and activity  Call your Doctor for any specific questions or problems.    Thank you for choosing [facility] Outpatient Infusion unit. It is our pleasure to serve you.      Hours 8:00 am - 5:00 pm  Phone Number: [phone]

## 2024-02-20 NOTE — PROGRESS NOTES
Ambulatory to unit room 1 for Evenity.Reorientated to unit.Procedure and plan of care explained.Questions answered.Understanding verbalized.Tolerated  well.Reviewed discharge instructions, understanding verbalized.Copies of AVS declined to take home. Patient discharged home with spouse.Down to exit per self.    Orders Placed This Encounter   Medications    romosozumab-aqqg (EVENITY) injection 105 mg    romosozumab-aqqg (EVENITY) injection 105 mg

## 2024-02-21 ENCOUNTER — OFFICE VISIT (OUTPATIENT)
Dept: FAMILY MEDICINE CLINIC | Age: 84
End: 2024-02-21
Payer: MEDICARE

## 2024-02-21 VITALS
DIASTOLIC BLOOD PRESSURE: 82 MMHG | BODY MASS INDEX: 25.33 KG/M2 | HEART RATE: 55 BPM | OXYGEN SATURATION: 97 % | SYSTOLIC BLOOD PRESSURE: 164 MMHG | TEMPERATURE: 98.3 F | WEIGHT: 121.2 LBS

## 2024-02-21 DIAGNOSIS — G25.0 ESSENTIAL TREMOR: ICD-10-CM

## 2024-02-21 DIAGNOSIS — R30.0 DYSURIA: ICD-10-CM

## 2024-02-21 DIAGNOSIS — N89.8 VAGINA ITCHING: ICD-10-CM

## 2024-02-21 DIAGNOSIS — I47.10 SVT (SUPRAVENTRICULAR TACHYCARDIA): ICD-10-CM

## 2024-02-21 DIAGNOSIS — N95.2 ATROPHIC VAGINITIS: ICD-10-CM

## 2024-02-21 DIAGNOSIS — N32.81 OVERACTIVE BLADDER: ICD-10-CM

## 2024-02-21 DIAGNOSIS — I10 ESSENTIAL HYPERTENSION: Primary | Chronic | ICD-10-CM

## 2024-02-21 LAB
BILIRUBIN, POC: NEGATIVE
BLOOD URINE, POC: ABNORMAL
CLARITY, POC: ABNORMAL
COLOR, POC: ABNORMAL
CREAT UR-MCNC: 53.8 MG/DL (ref 28–259)
GLUCOSE URINE, POC: NEGATIVE
KETONES, POC: NEGATIVE
LEUKOCYTE EST, POC: ABNORMAL
MICROALBUMIN UR DL<=1MG/L-MCNC: <1.2 MG/DL
MICROALBUMIN/CREAT UR: NORMAL MG/G (ref 0–30)
NITRITE, POC: NEGATIVE
PH, POC: 6.5
PROTEIN, POC: NEGATIVE
SPECIFIC GRAVITY, POC: 1.03
UROBILINOGEN, POC: 0.2

## 2024-02-21 PROCEDURE — 81002 URINALYSIS NONAUTO W/O SCOPE: CPT | Performed by: FAMILY MEDICINE

## 2024-02-21 PROCEDURE — 3078F DIAST BP <80 MM HG: CPT | Performed by: FAMILY MEDICINE

## 2024-02-21 PROCEDURE — 1123F ACP DISCUSS/DSCN MKR DOCD: CPT | Performed by: FAMILY MEDICINE

## 2024-02-21 PROCEDURE — 99214 OFFICE O/P EST MOD 30 MIN: CPT | Performed by: FAMILY MEDICINE

## 2024-02-21 PROCEDURE — 3077F SYST BP >= 140 MM HG: CPT | Performed by: FAMILY MEDICINE

## 2024-02-21 RX ORDER — DARIFENACIN HYDROBROMIDE 7.5 MG/1
7.5 TABLET, EXTENDED RELEASE ORAL DAILY
Qty: 90 TABLET | Refills: 3 | Status: SHIPPED | OUTPATIENT
Start: 2024-02-21 | End: 2024-02-21 | Stop reason: CLARIF

## 2024-02-21 RX ORDER — LISINOPRIL 10 MG/1
10 TABLET ORAL DAILY
Qty: 30 TABLET | Refills: 0 | Status: SHIPPED | OUTPATIENT
Start: 2024-02-21

## 2024-02-21 RX ORDER — ESTRADIOL 0.1 MG/G
2 CREAM VAGINAL DAILY
Qty: 1 EACH | Refills: 0 | Status: SHIPPED | OUTPATIENT
Start: 2024-02-21

## 2024-02-21 RX ORDER — METOPROLOL SUCCINATE 50 MG/1
50 TABLET, EXTENDED RELEASE ORAL DAILY
Qty: 90 TABLET | Refills: 1 | Status: SHIPPED | OUTPATIENT
Start: 2024-02-21

## 2024-02-21 RX ORDER — OXYBUTYNIN CHLORIDE 5 MG/1
5 TABLET, EXTENDED RELEASE ORAL DAILY
Qty: 90 TABLET | Refills: 1 | Status: SHIPPED | OUTPATIENT
Start: 2024-02-21

## 2024-02-21 ASSESSMENT — ENCOUNTER SYMPTOMS: SHORTNESS OF BREATH: 0

## 2024-02-21 NOTE — PATIENT INSTRUCTIONS
NEW OFFICE HOURS: M-TH 7AM-5PM      BRING YOUR MEDICATION BOTTLES TO ALL APPOINTMENTS    Check MY CHART for test results.  If you have forgotten your password, call [phone].    The diagnoses and medications listed in this after visit summary may not be up to date.  Check MY CHART in 28-48 hours for corrections.      Late cancellation policy: So that we can better accommodate people who are sick, please give our office 24 hour notice for an appointment cancellation.      Missed appointments: Your care is very important to us.  It is important that you keep your scheduled appointments.   Multiple missed appointments will lead to a dismissal from the office.      Patients arriving late will be worked into the schedule as time permits, with patients arriving on time taken as scheduled. Late arriving patients are more than welcome to wait or reschedule their appointments.    Please allow 5-7 business days for paperwork to be processed.

## 2024-02-22 LAB
BACTERIA UR CULT: NORMAL
C TRACH DNA CVX QL NAA+PROBE: NEGATIVE
CANDIDA DNA VAG QL NAA+PROBE: NORMAL
G VAGINALIS DNA SPEC QL NAA+PROBE: NORMAL
N GONORRHOEA DNA CERV MUCUS QL NAA+PROBE: NEGATIVE
T VAGINALIS DNA VAG QL NAA+PROBE: NORMAL

## 2024-03-06 ENCOUNTER — OFFICE VISIT (OUTPATIENT)
Dept: FAMILY MEDICINE CLINIC | Age: 84
End: 2024-03-06
Payer: MEDICARE

## 2024-03-06 VITALS
HEART RATE: 61 BPM | DIASTOLIC BLOOD PRESSURE: 64 MMHG | BODY MASS INDEX: 25.25 KG/M2 | SYSTOLIC BLOOD PRESSURE: 126 MMHG | WEIGHT: 120.8 LBS | OXYGEN SATURATION: 97 %

## 2024-03-06 DIAGNOSIS — I10 ESSENTIAL HYPERTENSION: Primary | Chronic | ICD-10-CM

## 2024-03-06 DIAGNOSIS — N95.2 ATROPHIC VAGINITIS: ICD-10-CM

## 2024-03-06 PROCEDURE — 1123F ACP DISCUSS/DSCN MKR DOCD: CPT | Performed by: FAMILY MEDICINE

## 2024-03-06 PROCEDURE — 3074F SYST BP LT 130 MM HG: CPT | Performed by: FAMILY MEDICINE

## 2024-03-06 PROCEDURE — 36415 COLL VENOUS BLD VENIPUNCTURE: CPT | Performed by: FAMILY MEDICINE

## 2024-03-06 PROCEDURE — 99213 OFFICE O/P EST LOW 20 MIN: CPT | Performed by: FAMILY MEDICINE

## 2024-03-06 PROCEDURE — 3078F DIAST BP <80 MM HG: CPT | Performed by: FAMILY MEDICINE

## 2024-03-06 RX ORDER — LISINOPRIL 10 MG/1
10 TABLET ORAL DAILY
Qty: 90 TABLET | Refills: 1 | Status: SHIPPED | OUTPATIENT
Start: 2024-03-06

## 2024-03-06 NOTE — PATIENT INSTRUCTIONS
NEW OFFICE HOURS: M-TH 7AM-5PM      BRING YOUR MEDICATION BOTTLES TO ALL APPOINTMENTS    Check MY CHART for test results.  If you have forgotten your password, call [phone].    The diagnoses and medications listed in this after visit summary may not be up to date.  Check MY CHART in 28-48 hours for corrections.      Late cancellation policy: So that we can better accommodate people who are sick, please give our office 24 hour notice for an appointment cancellation.      Missed appointments: Your care is very important to us.  It is important that you keep your scheduled appointments.   Multiple missed appointments will lead to a dismissal from the office.      Patients arriving late will be worked into the schedule as time permits, with patients arriving on time taken as scheduled. Late arriving patients are more than welcome to wait or reschedule their appointments.    Please allow 5-7 business days for paperwork to be processed.

## 2024-03-07 LAB
ANION GAP SERPL CALCULATED.3IONS-SCNC: 13 MMOL/L (ref 3–16)
BUN SERPL-MCNC: 17 MG/DL (ref 7–20)
CALCIUM SERPL-MCNC: 8.7 MG/DL (ref 8.3–10.6)
CHLORIDE SERPL-SCNC: 104 MMOL/L (ref 99–110)
CO2 SERPL-SCNC: 25 MMOL/L (ref 21–32)
CREAT SERPL-MCNC: 0.7 MG/DL (ref 0.6–1.2)
GFR SERPLBLD CREATININE-BSD FMLA CKD-EPI: >60 ML/MIN/{1.73_M2}
GLUCOSE SERPL-MCNC: 91 MG/DL (ref 70–99)
POTASSIUM SERPL-SCNC: 4.2 MMOL/L (ref 3.5–5.1)
SODIUM SERPL-SCNC: 142 MMOL/L (ref 136–145)

## 2024-03-18 RX ORDER — EPINEPHRINE 1 MG/ML
0.3 INJECTION, SOLUTION, CONCENTRATE INTRAVENOUS PRN
Status: CANCELLED | OUTPATIENT
Start: 2024-03-21

## 2024-03-18 RX ORDER — SODIUM CHLORIDE 9 MG/ML
INJECTION, SOLUTION INTRAVENOUS CONTINUOUS
Status: CANCELLED | OUTPATIENT
Start: 2024-03-21

## 2024-03-18 RX ORDER — ONDANSETRON 2 MG/ML
8 INJECTION INTRAMUSCULAR; INTRAVENOUS
Status: CANCELLED | OUTPATIENT
Start: 2024-03-21

## 2024-03-18 RX ORDER — FAMOTIDINE 10 MG/ML
20 INJECTION, SOLUTION INTRAVENOUS
Status: CANCELLED | OUTPATIENT
Start: 2024-03-21

## 2024-03-18 RX ORDER — ALBUTEROL SULFATE 90 UG/1
4 AEROSOL, METERED RESPIRATORY (INHALATION) PRN
Status: CANCELLED | OUTPATIENT
Start: 2024-03-21

## 2024-03-18 RX ORDER — ACETAMINOPHEN 325 MG/1
650 TABLET ORAL
Status: CANCELLED | OUTPATIENT
Start: 2024-03-21

## 2024-03-18 RX ORDER — DIPHENHYDRAMINE HYDROCHLORIDE 50 MG/ML
50 INJECTION INTRAMUSCULAR; INTRAVENOUS
Status: CANCELLED | OUTPATIENT
Start: 2024-03-21

## 2024-03-21 ENCOUNTER — HOSPITAL ENCOUNTER (OUTPATIENT)
Dept: INFUSION THERAPY | Age: 84
Setting detail: INFUSION SERIES
Discharge: HOME | End: 2024-03-21
Payer: MEDICARE

## 2024-03-21 VITALS
RESPIRATION RATE: 16 BRPM | SYSTOLIC BLOOD PRESSURE: 165 MMHG | OXYGEN SATURATION: 99 % | HEART RATE: 63 BPM | TEMPERATURE: 97.4 F | DIASTOLIC BLOOD PRESSURE: 65 MMHG

## 2024-03-21 DIAGNOSIS — M80.08XA AGE-RELATED OSTEOPOROSIS WITH CURRENT PATHOL FRACTURE OF VERTEBRA, INITIAL ENCOUNTER (HCC): Primary | ICD-10-CM

## 2024-03-21 PROCEDURE — 96372 THER/PROPH/DIAG INJ SC/IM: CPT

## 2024-03-21 PROCEDURE — 6360000002 HC RX W HCPCS

## 2024-03-21 RX ORDER — ACETAMINOPHEN 325 MG/1
650 TABLET ORAL
Status: CANCELLED | OUTPATIENT
Start: 2024-03-21

## 2024-03-21 RX ORDER — EPINEPHRINE 1 MG/ML
0.3 INJECTION, SOLUTION, CONCENTRATE INTRAVENOUS PRN
Status: CANCELLED | OUTPATIENT
Start: 2024-03-21

## 2024-03-21 RX ORDER — ONDANSETRON 2 MG/ML
8 INJECTION INTRAMUSCULAR; INTRAVENOUS
Status: CANCELLED | OUTPATIENT
Start: 2024-03-21

## 2024-03-21 RX ORDER — ALBUTEROL SULFATE 90 UG/1
4 AEROSOL, METERED RESPIRATORY (INHALATION) PRN
Status: CANCELLED | OUTPATIENT
Start: 2024-03-21

## 2024-03-21 RX ORDER — FAMOTIDINE 10 MG/ML
20 INJECTION, SOLUTION INTRAVENOUS
Status: CANCELLED | OUTPATIENT
Start: 2024-03-21

## 2024-03-21 RX ORDER — DIPHENHYDRAMINE HYDROCHLORIDE 50 MG/ML
50 INJECTION INTRAMUSCULAR; INTRAVENOUS
Status: CANCELLED | OUTPATIENT
Start: 2024-03-21

## 2024-03-21 RX ORDER — SODIUM CHLORIDE 9 MG/ML
INJECTION, SOLUTION INTRAVENOUS CONTINUOUS
Status: CANCELLED | OUTPATIENT
Start: 2024-03-21

## 2024-03-21 RX ADMIN — DENOSUMAB 60 MG: 60 INJECTION SUBCUTANEOUS at 09:22

## 2024-05-03 ENCOUNTER — OFFICE VISIT (OUTPATIENT)
Dept: FAMILY MEDICINE CLINIC | Age: 84
End: 2024-05-03

## 2024-05-03 VITALS
WEIGHT: 118 LBS | SYSTOLIC BLOOD PRESSURE: 128 MMHG | BODY MASS INDEX: 24.77 KG/M2 | OXYGEN SATURATION: 98 % | TEMPERATURE: 97.6 F | DIASTOLIC BLOOD PRESSURE: 70 MMHG | HEART RATE: 76 BPM | HEIGHT: 58 IN | RESPIRATION RATE: 16 BRPM

## 2024-05-03 DIAGNOSIS — H60.12 CELLULITIS OF LEFT EAR: Primary | ICD-10-CM

## 2024-05-03 RX ORDER — DOXYCYCLINE HYCLATE 100 MG
100 TABLET ORAL 2 TIMES DAILY
Qty: 20 TABLET | Refills: 0 | Status: SHIPPED | OUTPATIENT
Start: 2024-05-03 | End: 2024-05-13

## 2024-05-03 RX ORDER — DABIGATRAN ETEXILATE 150 MG/1
150 CAPSULE ORAL 2 TIMES DAILY
COMMUNITY
Start: 2024-04-11

## 2024-05-03 ASSESSMENT — ENCOUNTER SYMPTOMS
NAUSEA: 0
WHEEZING: 0
SINUS PRESSURE: 0
SHORTNESS OF BREATH: 0
SORE THROAT: 0
CHEST TIGHTNESS: 0
RHINORRHEA: 0
VOMITING: 0
DIARRHEA: 0
COUGH: 0
SINUS PAIN: 0

## 2024-05-03 NOTE — PROGRESS NOTES
Marta CLAY Stahl   83 y.o.  female  89      Chief Complaint   Patient presents with    Insect Bite     Pt c/o insect bite x2 days ago to L ear area        Subjective:  83 y.o.female is here for a follow up. She has the following chronic/acute medical problems:  Patient Active Problem List   Diagnosis    Essential hypertension    Osteoporosis    Hx of colonic polyps    Degenerative disc disease, lumbar    Spondylolisthesis of lumbar region    Osteoarthritis of cervical spine    SVT (supraventricular tachycardia) ([facility])    Chronic left-sided low back pain with left-sided sciatica    Nonrheumatic aortic valve insufficiency    Mitral regurgitation    History of compression fracture of spine    Anticoagulated    Paroxysmal atrial fibrillation ([facility])    Nondisplaced intertrochanteric fracture of left femur, subsequent encounter for closed fracture with routine healing    Bilateral primary osteoarthritis of knee    S/p left hip fracture    Age-related osteoporosis with current pathol fracture of vertebra, initial encounter ([facility])    Age-related osteoporosis with current pathological fracture, vertebra(e), sequela       Patient is here today with complaints of an inset bite to her left ear. Patient states this happened two days ago - states she was out in the garden. Patient states now the ear is swollen and red. Patient states it feels warm to the touch but states it does not feel as hot as it was. Patient states the ear burns.         Review of Systems   Constitutional:  Negative for appetite change, chills, fatigue and fever.   HENT:  Negative for congestion, ear pain, postnasal drip, rhinorrhea, sinus pressure, sinus pain, sneezing and sore throat.    Respiratory:  Negative for cough, chest tightness, shortness of breath and wheezing.    Cardiovascular:  Negative for chest pain and palpitations.   Gastrointestinal:  Negative for diarrhea, nausea and vomiting.   Skin:  Negative for rash.   Neurological:  Negative for

## 2024-06-24 ENCOUNTER — OFFICE VISIT (OUTPATIENT)
Dept: FAMILY MEDICINE CLINIC | Age: 84
End: 2024-06-24

## 2024-06-24 VITALS
TEMPERATURE: 97.8 F | WEIGHT: 119.4 LBS | SYSTOLIC BLOOD PRESSURE: 110 MMHG | HEART RATE: 61 BPM | OXYGEN SATURATION: 97 % | BODY MASS INDEX: 24.95 KG/M2 | DIASTOLIC BLOOD PRESSURE: 70 MMHG

## 2024-06-24 DIAGNOSIS — R13.10 PAINFUL SWALLOWING: ICD-10-CM

## 2024-06-24 DIAGNOSIS — J20.9 ACUTE BRONCHITIS, UNSPECIFIED ORGANISM: Primary | ICD-10-CM

## 2024-06-24 RX ORDER — AZITHROMYCIN 250 MG/1
250 TABLET, FILM COATED ORAL SEE ADMIN INSTRUCTIONS
Qty: 6 TABLET | Refills: 0 | Status: SHIPPED | OUTPATIENT
Start: 2024-06-24 | End: 2024-06-29

## 2024-06-24 NOTE — PATIENT INSTRUCTIONS
NEW OFFICE HOURS: M-TH 7AM-5PM  BRING YOUR MEDICATION BOTTLES TO ALL APPOINTMENTS    Check MY CHART for test results.  If you have forgotten your password, call [phone].  The diagnoses and medications listed in this after visit summary may not be up to date.  Check MY CHART in 28-48 hours for corrections.      Late cancellation policy: So that we can better accommodate people who are sick, please give our office 24 hour notice for an appointment cancellation.    Missed appointments: Your care is very important to us.  It is important that you keep your scheduled appointments.   Multiple missed appointments will lead to a dismissal from the office.    Patients arriving late will be worked into the schedule as time permits, with patients arriving on time taken as scheduled. Late arriving patients are more than welcome to wait or reschedule their appointments.    Please allow 5-7 business days for paperwork to be processed.

## 2024-06-24 NOTE — PROGRESS NOTES
Patient ID: Marta Stahl 1940    Chief Complaint   Patient presents with    Pharyngitis     Hurts to swallow x 2 months states she was on a otc cough tablet and once she stopped taking it she doesn't have a sore throat since not taking it she doesn't remember the name of the otc medication     Cough     X 2 months  when coughing so hard eyes watery   When she gets allergies like runny nose she ends up with a cough into her chest          HPI    Sore throat: almost gone now.  2 months.    Cough: 2 months.  Worse with lying down.  Not related to allergies.   Dry.  No F, chills, sweats.  Nothing makes it worse.      Patient Active Problem List   Diagnosis    Essential hypertension    Osteoporosis    Hx of colonic polyps    Degenerative disc disease, lumbar    Spondylolisthesis of lumbar region    Osteoarthritis of cervical spine    SVT (supraventricular tachycardia) ([facility])    Chronic left-sided low back pain with left-sided sciatica    Nonrheumatic aortic valve insufficiency    Mitral regurgitation    History of compression fracture of spine    Anticoagulated    Paroxysmal atrial fibrillation ([facility])    Nondisplaced intertrochanteric fracture of left femur, subsequent encounter for closed fracture with routine healing    Bilateral primary osteoarthritis of knee    S/p left hip fracture    Age-related osteoporosis with current pathol fracture of vertebra, initial encounter ([facility])    Age-related osteoporosis with current pathological fracture, vertebra(e), sequela       Past Surgical History:   Procedure Laterality Date    CHOLECYSTECTOMY  age 30's    open    COLONOSCOPY  12/2010    DILATION AND CURETTAGE OF UTERUS  age31's    FEMUR FRACTURE SURGERY Left 10/18/2021    FEMUR IM NAIL PB INSERTION performed by Gregory Davis DO at [facility]    FINGER TRIGGER RELEASE Right 12/23/2014    HERNIA REPAIR  2011    right ing hernia    HYSTERECTOMY (CERVIX STATUS UNKNOWN)  age 30's    \"took both ovaries\"    TONSILLECTOMY

## 2024-06-28 ENCOUNTER — TELEPHONE (OUTPATIENT)
Dept: FAMILY MEDICINE CLINIC | Age: 84
End: 2024-06-28

## 2024-06-28 NOTE — TELEPHONE ENCOUNTER
Patient wants to know if she can get a refill on Azithromycin she only has 1 tablet left she said it helping her and feels if she finishes her symptoms will return.

## 2024-07-01 NOTE — TELEPHONE ENCOUNTER
I'm glad she is feeling better but we are not supposed to prescribe antibiotics that way.  She can call or be re-checked if symptoms return in the future.

## 2024-08-07 ENCOUNTER — OFFICE VISIT (OUTPATIENT)
Dept: FAMILY MEDICINE CLINIC | Age: 84
End: 2024-08-07

## 2024-08-07 VITALS
HEART RATE: 58 BPM | OXYGEN SATURATION: 98 % | WEIGHT: 119 LBS | DIASTOLIC BLOOD PRESSURE: 70 MMHG | BODY MASS INDEX: 24.87 KG/M2 | SYSTOLIC BLOOD PRESSURE: 128 MMHG

## 2024-08-07 DIAGNOSIS — I47.10 SVT (SUPRAVENTRICULAR TACHYCARDIA) (HCC): ICD-10-CM

## 2024-08-07 DIAGNOSIS — J20.9 ACUTE BRONCHITIS, UNSPECIFIED ORGANISM: ICD-10-CM

## 2024-08-07 DIAGNOSIS — N32.81 OVERACTIVE BLADDER: Primary | ICD-10-CM

## 2024-08-07 DIAGNOSIS — G25.0 ESSENTIAL TREMOR: ICD-10-CM

## 2024-08-07 DIAGNOSIS — E78.5 HYPERLIPIDEMIA, UNSPECIFIED HYPERLIPIDEMIA TYPE: ICD-10-CM

## 2024-08-07 DIAGNOSIS — I10 ESSENTIAL HYPERTENSION: Chronic | ICD-10-CM

## 2024-08-07 DIAGNOSIS — I48.0 PAROXYSMAL ATRIAL FIBRILLATION (HCC): ICD-10-CM

## 2024-08-07 RX ORDER — ATORVASTATIN CALCIUM 10 MG/1
10 TABLET, FILM COATED ORAL DAILY
Qty: 90 TABLET | Refills: 3 | Status: SHIPPED | OUTPATIENT
Start: 2024-08-07

## 2024-08-07 RX ORDER — LISINOPRIL 10 MG/1
10 TABLET ORAL DAILY
Qty: 90 TABLET | Refills: 1 | Status: SHIPPED | OUTPATIENT
Start: 2024-08-07

## 2024-08-07 RX ORDER — METOPROLOL SUCCINATE 50 MG/1
50 TABLET, EXTENDED RELEASE ORAL DAILY
Qty: 90 TABLET | Refills: 1 | Status: SHIPPED | OUTPATIENT
Start: 2024-08-07

## 2024-08-07 RX ORDER — SOLIFENACIN SUCCINATE 10 MG/1
10 TABLET, FILM COATED ORAL DAILY
Qty: 90 TABLET | Refills: 3 | Status: SHIPPED | OUTPATIENT
Start: 2024-08-07 | End: 2025-08-07

## 2024-08-07 RX ORDER — AMOXICILLIN AND CLAVULANATE POTASSIUM 875; 125 MG/1; MG/1
1 TABLET, FILM COATED ORAL 2 TIMES DAILY
Qty: 20 TABLET | Refills: 0 | Status: SHIPPED | OUTPATIENT
Start: 2024-08-07 | End: 2024-08-17

## 2024-08-07 RX ORDER — AMOXICILLIN AND CLAVULANATE POTASSIUM 875; 125 MG/1; MG/1
1 TABLET, FILM COATED ORAL 2 TIMES DAILY
Qty: 20 TABLET | Refills: 0 | Status: SHIPPED | OUTPATIENT
Start: 2024-08-07 | End: 2024-08-07 | Stop reason: SDUPTHER

## 2024-08-07 ASSESSMENT — ENCOUNTER SYMPTOMS: SHORTNESS OF BREATH: 0

## 2024-08-07 NOTE — PROGRESS NOTES
Patient ID: Marta Stahl 1940    .  Chief Complaint   Patient presents with    Hypertension    Oral Swelling     Feels like there is something in throat that will not go away. Foamy, cough up yellow phlegm.          Hypertension  This is a chronic problem. The current episode started more than 1 year ago. The problem is unchanged. The problem is controlled. Pertinent negatives include no palpitations or shortness of breath. Risk factors for coronary artery disease include post-menopausal state. Past treatments include beta blockers and diuretics.   Hyperlipidemia  This is a chronic problem. The current episode started more than 1 year ago. Pertinent negatives include no shortness of breath. Current antihyperlipidemic treatment includes statins.   Urinary Frequency   This is a new problem. The current episode started more than 1 year ago. The problem occurs intermittently (aggravated by coughing or sneezing). The problem has been unchanged. Associated symptoms include frequency and urgency. Treatments tried: vesicare helped and would like to try it again.     Cold symptoms: x 3 1/2 months.  Mouthful of foam 2-3 times per day.     No runny nose.  Coughing up sputum from the throat.    Patient Active Problem List   Diagnosis    Essential hypertension    Osteoporosis    Hx of colonic polyps    Degenerative disc disease, lumbar    Spondylolisthesis of lumbar region    Osteoarthritis of cervical spine    SVT (supraventricular tachycardia) ([facility])    Chronic left-sided low back pain with left-sided sciatica    Nonrheumatic aortic valve insufficiency    Mitral regurgitation    History of compression fracture of spine    Anticoagulated    Paroxysmal atrial fibrillation ([facility])    Nondisplaced intertrochanteric fracture of left femur, subsequent encounter for closed fracture with routine healing    Bilateral primary osteoarthritis of knee    S/p left hip fracture    Age-related osteoporosis with current pathol fracture of

## 2024-08-07 NOTE — PATIENT INSTRUCTIONS
VOTE TUESDAY NOVEMBER 5, 2024        BRING YOUR MEDICATION BOTTLES TO ALL APPOINTMENTS    Check MY CHART for test results.  If you have forgotten your password, call [phone].  The diagnoses and medications listed in this after visit summary may not be up to date.  Check MY CHART in 28-48 hours for corrections.      Late cancellation policy: So that we can better accommodate people who are sick, please give our office 24 hour notice for an appointment cancellation.    Missed appointments: Your care is very important to us.  It is important that you keep your scheduled appointments.   Multiple missed appointments will lead to a dismissal from the office.    Patients arriving late will be worked into the schedule as time permits, with patients arriving on time taken as scheduled. Late arriving patients are more than welcome to wait or reschedule their appointments.    Please allow 5-7 business days for paperwork to be processed.

## 2024-08-21 ENCOUNTER — TELEPHONE (OUTPATIENT)
Dept: FAMILY MEDICINE CLINIC | Age: 84
End: 2024-08-21

## 2024-08-21 DIAGNOSIS — M81.0 AGE-RELATED OSTEOPOROSIS WITHOUT CURRENT PATHOLOGICAL FRACTURE: ICD-10-CM

## 2024-08-21 RX ORDER — ALENDRONATE SODIUM 70 MG/1
70 TABLET ORAL
Qty: 13 TABLET | Refills: 3 | Status: SHIPPED | OUTPATIENT
Start: 2024-08-21

## 2024-08-21 NOTE — TELEPHONE ENCOUNTER
I decline to do so.  Will put patient back on Fosamax instead. She was on this previously.   Let infusion Center and patient know.

## 2024-08-21 NOTE — TELEPHONE ENCOUNTER
Luz from the infusion center called stating that the patient needs a script for Prolia sent in. Patient gets this infusion every 6 months for her osteoporosis. The doctor that was writing for the Prolia has left and all orders has been kick out to the patients pcp. Luz stated the patient is due for the prolia  in September . Please fax script to [phone]

## 2024-08-22 ENCOUNTER — TELEPHONE (OUTPATIENT)
Dept: FAMILY MEDICINE CLINIC | Age: 84
End: 2024-08-22

## 2024-08-22 DIAGNOSIS — J20.9 ACUTE BRONCHITIS, UNSPECIFIED ORGANISM: Primary | ICD-10-CM

## 2024-08-22 DIAGNOSIS — R13.10 PAINFUL SWALLOWING: ICD-10-CM

## 2024-08-22 NOTE — TELEPHONE ENCOUNTER
Left message on voicemail at the infusion clinic patient is going back on fosamax and to please cancel infusion appt in September

## 2024-08-22 NOTE — TELEPHONE ENCOUNTER
Patient stated she has not improved with medication. Patient asking for referral for ENT.  Patient has a constant cough, foam comes up in mouth and fills it.

## 2024-08-23 NOTE — PROGRESS NOTES
Date of surgery: 10-     Procedure:  Left hip cephalomedullary nailing on       History:  Yaya Carlton is here in follow up regarding their left hip cephalomedullary nailing 2 weeks prior. Patient is doing well. They have 0/10 pain currently but does states she has episodic moments of 5/10 pain. They deny chest pain, SOB, calf pain,fever,wound drainage. No other issues. Patient states they have been compliant with restrictions. Patient has been taking Eliquis for DVT prophylaxis as prescribed by separate physician     Physical:   Patient demonstrates appropriate mood and affect. Left lower extremity exam:   The incisions are clean, dry, intact and nontender with no erythema. They have minimal edema, the Leg compartments are soft . There are No cords or calf tenderness. No significant calf/ankle edema. They are neurovascularly intact distally. ROM of the hip is intact. .    Imaging:   X-rays taken today demonstrate 5 views of a left hip and pelvis demonstrating previous cephalomedullary nailing. No sign of any hardware failure. No sign of any displacement of fracture from intraoperative imaging. No sign of any new osseous abnormalities. Impression: Status post above, doing well        Plan:   -Imaging reviewed with patient  -continue the PT protocol  -Weightbearing as tolerated and range of motion as tolerated.   -Continue Eliquis as ordered by outside physician  -rest/elevation as needed  -f/u in 4 week(s) with new x-rays at next visit  -f/u sooner prn any issues 88Y female presenting with chief complaint of sob. Pt with grossly intact oral motor evaluation, however, adamantly refusing further swallow intervention stating "I had this before, I don't want it again- I will eat normal food and drink my water". SLP reviewed prior bedside swallow and MBS results, recommendations, and risk of possible laryngeal penetration/aspiration. Pt still declined further intervention stating she will continue her current diet despite aspiration risks discussed. This service will continue to follow peripherally; please reconsult should patient decide to pursue swallow intervention. Palliative/GOC discussion recommended to discuss code status/comfort measure given hx of dysphagia and patient opting to pursue diet of regular solids/thin liquids despite aspiration risks.

## 2024-08-26 DIAGNOSIS — H60.12 CELLULITIS OF LEFT EAR: ICD-10-CM

## 2024-08-26 RX ORDER — DOXYCYCLINE HYCLATE 100 MG
100 TABLET ORAL 2 TIMES DAILY
Qty: 20 TABLET | Refills: 0 | OUTPATIENT
Start: 2024-08-26 | End: 2024-09-05

## 2024-08-27 NOTE — TELEPHONE ENCOUNTER
Patient saw ENT on 8/26/24.  ENT sent script for Acid reflux to her pharmacy.  Patient does not need any antibiotic according to ENT.

## 2024-11-05 ENCOUNTER — NURSE ONLY (OUTPATIENT)
Dept: FAMILY MEDICINE CLINIC | Age: 84
End: 2024-11-05
Payer: MEDICARE

## 2024-11-05 DIAGNOSIS — Z23 FLU VACCINE NEED: Primary | ICD-10-CM

## 2024-11-05 PROCEDURE — G0008 ADMIN INFLUENZA VIRUS VAC: HCPCS | Performed by: FAMILY MEDICINE

## 2024-11-05 PROCEDURE — 90653 IIV ADJUVANT VACCINE IM: CPT | Performed by: FAMILY MEDICINE

## 2025-01-08 ENCOUNTER — TELEMEDICINE (OUTPATIENT)
Dept: FAMILY MEDICINE CLINIC | Age: 85
End: 2025-01-08

## 2025-01-08 DIAGNOSIS — Z00.00 MEDICARE ANNUAL WELLNESS VISIT, SUBSEQUENT: Primary | ICD-10-CM

## 2025-01-08 SDOH — ECONOMIC STABILITY: FOOD INSECURITY: WITHIN THE PAST 12 MONTHS, YOU WORRIED THAT YOUR FOOD WOULD RUN OUT BEFORE YOU GOT MONEY TO BUY MORE.: NEVER TRUE

## 2025-01-08 SDOH — ECONOMIC STABILITY: FOOD INSECURITY: WITHIN THE PAST 12 MONTHS, THE FOOD YOU BOUGHT JUST DIDN'T LAST AND YOU DIDN'T HAVE MONEY TO GET MORE.: NEVER TRUE

## 2025-01-08 ASSESSMENT — PATIENT HEALTH QUESTIONNAIRE - PHQ9
SUM OF ALL RESPONSES TO PHQ QUESTIONS 1-9: 0
1. LITTLE INTEREST OR PLEASURE IN DOING THINGS: NOT AT ALL
SUM OF ALL RESPONSES TO PHQ QUESTIONS 1-9: 0
SUM OF ALL RESPONSES TO PHQ9 QUESTIONS 1 & 2: 0
SUM OF ALL RESPONSES TO PHQ QUESTIONS 1-9: 0
2. FEELING DOWN, DEPRESSED OR HOPELESS: NOT AT ALL
SUM OF ALL RESPONSES TO PHQ QUESTIONS 1-9: 0

## 2025-01-08 ASSESSMENT — LIFESTYLE VARIABLES
HOW MANY STANDARD DRINKS CONTAINING ALCOHOL DO YOU HAVE ON A TYPICAL DAY: PATIENT DOES NOT DRINK
HOW OFTEN DO YOU HAVE A DRINK CONTAINING ALCOHOL: NEVER

## 2025-01-08 NOTE — PATIENT INSTRUCTIONS
Personalized Preventive Plan for Marta Stahl - 1/8/2025  Medicare offers a range of preventive health benefits. Some of the tests and screenings are paid in full while other may be subject to a deductible, co-insurance, and/or copay.  Some of these benefits include a comprehensive review of your medical history including lifestyle, illnesses that may run in your family, and various assessments and screenings as appropriate.  After reviewing your medical record and screening and assessments performed today your provider may have ordered immunizations, labs, imaging, and/or referrals for you.  A list of these orders (if applicable) as well as your Preventive Care list are included within your After Visit Summary for your review.

## 2025-01-08 NOTE — PROGRESS NOTES
Medicare Annual Wellness Visit    Marta Stahl is here for Medicare AWV    Assessment & Plan   Medicare annual wellness visit, subsequent     No follow-ups on file.     Subjective       Patient's complete Health Risk Assessment and screening values have been reviewed and are found in Flowsheets. The following problems were reviewed today and where indicated follow up appointments were made and/or referrals ordered.    Positive Risk Factor Screenings with Interventions:              Inactivity:  On average, how many days per week do you engage in moderate to strenuous exercise (like a brisk walk)?: 2 days (active all day long) (!) Abnormal  On average, how many minutes do you engage in exercise at this level?: 10 min  Interventions:  Patient comments: patient states she stays active all day long doing things around her house.  Patient declined any further interventions or treatment         Safety:  Do you always fasten your seatbelt when you are in a car?: (!) No  Interventions:  Patient comments: encouraged patient to please always fasten her seatbelt for her safety.  Patient declined any further interventions or treatment     Advanced Directives:  Do you have a Living Will?: (!) No (not)    Intervention:  has NO advanced directive - not interested in additional information                     Objective    Patient-Reported Vitals  No data recorded     Unable to obtain 3 vital signs due to patient not having equipment to take blood pressure/temperature.              No Known Allergies  Prior to Visit Medications    Medication Sig Taking? Authorizing Provider   alendronate (FOSAMAX) 70 MG tablet Take 1 tablet by mouth every 7 days Yes Vianey Parr MD   solifenacin (VESICARE) 10 MG tablet Take 1 tablet by mouth daily Yes Vianey Parr MD   atorvastatin (LIPITOR) 10 MG tablet Take 1 tablet by mouth daily Yes Vianey Parr MD   lisinopril (PRINIVIL;ZESTRIL) 10 MG tablet Take 1 tablet by mouth daily Yes Keshav

## 2025-01-29 ENCOUNTER — OFFICE VISIT (OUTPATIENT)
Dept: FAMILY MEDICINE CLINIC | Age: 85
End: 2025-01-29
Payer: MEDICARE

## 2025-01-29 VITALS
BODY MASS INDEX: 25.54 KG/M2 | OXYGEN SATURATION: 97 % | HEART RATE: 62 BPM | WEIGHT: 122.2 LBS | DIASTOLIC BLOOD PRESSURE: 71 MMHG | SYSTOLIC BLOOD PRESSURE: 139 MMHG

## 2025-01-29 DIAGNOSIS — I10 ESSENTIAL HYPERTENSION: Primary | Chronic | ICD-10-CM

## 2025-01-29 DIAGNOSIS — G25.0 ESSENTIAL TREMOR: ICD-10-CM

## 2025-01-29 DIAGNOSIS — E78.5 HYPERLIPIDEMIA, UNSPECIFIED HYPERLIPIDEMIA TYPE: ICD-10-CM

## 2025-01-29 DIAGNOSIS — I48.0 PAROXYSMAL ATRIAL FIBRILLATION (HCC): ICD-10-CM

## 2025-01-29 DIAGNOSIS — Z29.11 NEED FOR PROPHYLACTIC VACCINATION AND INOCULATION AGAINST RESPIRATORY SYNCYTIAL VIRUS (RSV): ICD-10-CM

## 2025-01-29 DIAGNOSIS — I47.10 SVT (SUPRAVENTRICULAR TACHYCARDIA) (HCC): ICD-10-CM

## 2025-01-29 DIAGNOSIS — R05.3 CHRONIC COUGH: ICD-10-CM

## 2025-01-29 PROCEDURE — 99214 OFFICE O/P EST MOD 30 MIN: CPT | Performed by: FAMILY MEDICINE

## 2025-01-29 PROCEDURE — 1159F MED LIST DOCD IN RCRD: CPT | Performed by: FAMILY MEDICINE

## 2025-01-29 PROCEDURE — 3075F SYST BP GE 130 - 139MM HG: CPT | Performed by: FAMILY MEDICINE

## 2025-01-29 PROCEDURE — 3078F DIAST BP <80 MM HG: CPT | Performed by: FAMILY MEDICINE

## 2025-01-29 PROCEDURE — 1123F ACP DISCUSS/DSCN MKR DOCD: CPT | Performed by: FAMILY MEDICINE

## 2025-01-29 PROCEDURE — 36415 COLL VENOUS BLD VENIPUNCTURE: CPT | Performed by: FAMILY MEDICINE

## 2025-01-29 RX ORDER — METOPROLOL SUCCINATE 50 MG/1
50 TABLET, EXTENDED RELEASE ORAL DAILY
Qty: 90 TABLET | Refills: 1 | Status: SHIPPED | OUTPATIENT
Start: 2025-01-29

## 2025-01-29 RX ORDER — LISINOPRIL 10 MG/1
10 TABLET ORAL DAILY
Qty: 90 TABLET | Refills: 1 | Status: SHIPPED | OUTPATIENT
Start: 2025-01-29

## 2025-01-29 NOTE — PROGRESS NOTES
Patient ID: Marta Stahl 1940    .  Chief Complaint   Patient presents with    Hypertension    Hyperlipidemia    OTHER     Never got rid of the cough was seen at ENT and she still has the cough, it is better but still have the cough, feels like something is stuck in her throat.             History of Present Illness  The patient is an 84-year-old female who presents for evaluation of hypertension, hypercholesterolemia, and a persistent cough.    She has been experiencing a persistent cough for approximately 8 months. Initially, the cough was productive with foamy sputum, but it has since become non-productive. She reports no associated shortness of breath. She also reports a sensation of an unidentified object in her neck on the left side, which has not been bothersome recently. Despite undergoing various tests, including an allergy test, the cause of her symptoms remains undetermined. A CT scan of her neck was performed at [facility], yielding normal results. She has not had a CT scan of her lungs. She is currently on allergy medication and was provided with information about another potential allergy medication, although her cough was not attributed to allergies. She does not have any symptoms of acid reflux or heartburn. She is no longer under the care of an otolaryngologist.    She is currently on a regimen of lisinopril 10 mg and metoprolol 50 mg for her hypertension. She reports no cardiac issues, including chest pain, pressure, or dizziness. She has a history of paroxysmal atrial fibrillation and supraventricular tachycardia but is not on any anticoagulant therapy.    She is on metoprolol for her tremors, which she reports are not helping. The tremors are present in both hands but are worse in the left hand.    MEDICATIONS  lisinopril, metoprolol    Patient Active Problem List   Diagnosis    Essential hypertension    Osteoporosis    Hx of colonic polyps    Degenerative disc disease, lumbar

## 2025-01-30 LAB
ANION GAP SERPL CALCULATED.3IONS-SCNC: 8 MMOL/L (ref 3–16)
BUN SERPL-MCNC: 18 MG/DL (ref 7–20)
CALCIUM SERPL-MCNC: 9.3 MG/DL (ref 8.3–10.6)
CHLORIDE SERPL-SCNC: 105 MMOL/L (ref 99–110)
CHOLEST SERPL-MCNC: 123 MG/DL (ref 0–199)
CO2 SERPL-SCNC: 29 MMOL/L (ref 21–32)
CREAT SERPL-MCNC: 0.8 MG/DL (ref 0.6–1.2)
GFR SERPLBLD CREATININE-BSD FMLA CKD-EPI: 72 ML/MIN/{1.73_M2}
GLUCOSE SERPL-MCNC: 84 MG/DL (ref 70–99)
HDLC SERPL-MCNC: 45 MG/DL (ref 40–60)
LDLC SERPL CALC-MCNC: 53 MG/DL
POTASSIUM SERPL-SCNC: 4.8 MMOL/L (ref 3.5–5.1)
SODIUM SERPL-SCNC: 142 MMOL/L (ref 136–145)
TRIGL SERPL-MCNC: 126 MG/DL (ref 0–150)
VLDLC SERPL CALC-MCNC: 25 MG/DL

## 2025-02-13 ENCOUNTER — HOSPITAL ENCOUNTER (OUTPATIENT)
Dept: CT IMAGING | Age: 85
Discharge: HOME | End: 2025-02-13
Attending: FAMILY MEDICINE
Payer: MEDICARE

## 2025-02-13 DIAGNOSIS — R05.3 CHRONIC COUGH: ICD-10-CM

## 2025-02-13 PROCEDURE — 6360000004 HC RX CONTRAST MEDICATION: Performed by: FAMILY MEDICINE

## 2025-02-13 PROCEDURE — 71260 CT THORAX DX C+: CPT

## 2025-02-13 RX ORDER — IOPAMIDOL 755 MG/ML
75 INJECTION, SOLUTION INTRAVASCULAR
Status: COMPLETED | OUTPATIENT
Start: 2025-02-13 | End: 2025-02-13

## 2025-02-13 RX ADMIN — IOPAMIDOL 75 ML: 755 INJECTION, SOLUTION INTRAVENOUS at 14:24

## 2025-02-18 DIAGNOSIS — R93.89 ABNORMAL CT SCAN: Primary | ICD-10-CM

## 2025-03-03 ENCOUNTER — OFFICE VISIT (OUTPATIENT)
Dept: GASTROENTEROLOGY | Age: 85
End: 2025-03-03
Payer: MEDICARE

## 2025-03-03 ENCOUNTER — HOSPITAL ENCOUNTER (OUTPATIENT)
Age: 85
Discharge: HOME | End: 2025-03-03
Payer: MEDICARE

## 2025-03-03 VITALS
SYSTOLIC BLOOD PRESSURE: 122 MMHG | OXYGEN SATURATION: 98 % | HEART RATE: 62 BPM | HEIGHT: 58 IN | BODY MASS INDEX: 26.41 KG/M2 | DIASTOLIC BLOOD PRESSURE: 82 MMHG | RESPIRATION RATE: 16 BRPM | WEIGHT: 125.8 LBS

## 2025-03-03 DIAGNOSIS — R93.89 ABNORMAL FINDING ON CT SCAN: Primary | ICD-10-CM

## 2025-03-03 DIAGNOSIS — K83.8 DILATION OF COMMON BILE DUCT: ICD-10-CM

## 2025-03-03 DIAGNOSIS — R93.89 ABNORMAL FINDING ON CT SCAN: ICD-10-CM

## 2025-03-03 LAB
ALBUMIN SERPL-MCNC: 3.9 G/DL (ref 3.4–5)
ALBUMIN/GLOB SERPL: 1.6 {RATIO} (ref 1.1–2.2)
ALP SERPL-CCNC: 50 U/L (ref 40–129)
ALT SERPL-CCNC: 8 U/L (ref 10–40)
ANION GAP SERPL CALCULATED.3IONS-SCNC: 6 MMOL/L (ref 9–17)
AST SERPL-CCNC: 17 U/L (ref 15–37)
BASOPHILS # BLD: 0.04 K/UL
BASOPHILS NFR BLD: 1 % (ref 0–1)
BILIRUB SERPL-MCNC: 0.6 MG/DL (ref 0–1)
BUN SERPL-MCNC: 24 MG/DL (ref 7–20)
CALCIUM SERPL-MCNC: 9.3 MG/DL (ref 8.3–10.6)
CHLORIDE SERPL-SCNC: 105 MMOL/L (ref 99–110)
CO2 SERPL-SCNC: 29 MMOL/L (ref 21–32)
CREAT SERPL-MCNC: 0.8 MG/DL (ref 0.6–1.2)
EOSINOPHIL # BLD: 0.09 K/UL
EOSINOPHILS RELATIVE PERCENT: 1 % (ref 0–3)
ERYTHROCYTE [DISTWIDTH] IN BLOOD BY AUTOMATED COUNT: 12.5 % (ref 11.7–14.9)
GFR, ESTIMATED: 68 ML/MIN/1.73M2
GLUCOSE SERPL-MCNC: 97 MG/DL (ref 74–99)
HCT VFR BLD AUTO: 43.1 % (ref 37–47)
HGB BLD-MCNC: 13.8 G/DL (ref 12.5–16)
IMM GRANULOCYTES # BLD AUTO: 0.01 K/UL
IMM GRANULOCYTES NFR BLD: 0 %
LYMPHOCYTES NFR BLD: 1.7 K/UL
LYMPHOCYTES RELATIVE PERCENT: 26 % (ref 24–44)
MCH RBC QN AUTO: 32.5 PG (ref 27–31)
MCHC RBC AUTO-ENTMCNC: 32 G/DL (ref 32–36)
MCV RBC AUTO: 101.4 FL (ref 78–100)
MONOCYTES NFR BLD: 0.47 K/UL
MONOCYTES NFR BLD: 7 % (ref 0–4)
NEUTROPHILS NFR BLD: 65 % (ref 36–66)
NEUTS SEG NFR BLD: 4.33 K/UL
PLATELET # BLD AUTO: 161 K/UL (ref 140–440)
PMV BLD AUTO: 10.8 FL (ref 7.5–11.1)
POTASSIUM SERPL-SCNC: 4.4 MMOL/L (ref 3.5–5.1)
PROT SERPL-MCNC: 6.4 G/DL (ref 6.4–8.2)
RBC # BLD AUTO: 4.25 M/UL (ref 4.2–5.4)
SODIUM SERPL-SCNC: 140 MMOL/L (ref 136–145)
WBC OTHER # BLD: 6.6 K/UL (ref 4–10.5)

## 2025-03-03 PROCEDURE — 99203 OFFICE O/P NEW LOW 30 MIN: CPT | Performed by: NURSE PRACTITIONER

## 2025-03-03 PROCEDURE — 3074F SYST BP LT 130 MM HG: CPT | Performed by: NURSE PRACTITIONER

## 2025-03-03 PROCEDURE — 85025 COMPLETE CBC W/AUTO DIFF WBC: CPT

## 2025-03-03 PROCEDURE — 3079F DIAST BP 80-89 MM HG: CPT | Performed by: NURSE PRACTITIONER

## 2025-03-03 PROCEDURE — 1160F RVW MEDS BY RX/DR IN RCRD: CPT | Performed by: NURSE PRACTITIONER

## 2025-03-03 PROCEDURE — G2211 COMPLEX E/M VISIT ADD ON: HCPCS | Performed by: NURSE PRACTITIONER

## 2025-03-03 PROCEDURE — 1159F MED LIST DOCD IN RCRD: CPT | Performed by: NURSE PRACTITIONER

## 2025-03-03 PROCEDURE — 1123F ACP DISCUSS/DSCN MKR DOCD: CPT | Performed by: NURSE PRACTITIONER

## 2025-03-03 PROCEDURE — 80053 COMPREHEN METABOLIC PANEL: CPT

## 2025-03-03 ASSESSMENT — ENCOUNTER SYMPTOMS
DIARRHEA: 0
VOMITING: 0
COUGH: 1
BLOOD IN STOOL: 0
PHOTOPHOBIA: 0
BACK PAIN: 0
WHEEZING: 0
NAUSEA: 0
ABDOMINAL PAIN: 0
EYE PAIN: 0
CONSTIPATION: 0
COLOR CHANGE: 0
SHORTNESS OF BREATH: 0

## 2025-03-03 NOTE — PROGRESS NOTES
Systems   Constitutional:  Negative for chills, diaphoresis, fatigue and fever.   HENT:  Positive for tinnitus. Negative for ear pain and hearing loss.    Eyes:  Positive for visual disturbance. Negative for photophobia and pain.   Respiratory:  Positive for cough. Negative for shortness of breath and wheezing.    Cardiovascular:  Negative for chest pain, palpitations and leg swelling.   Gastrointestinal:  Negative for abdominal pain, blood in stool, constipation, diarrhea, nausea and vomiting.   Endocrine: Negative for cold intolerance, heat intolerance and polydipsia.   Genitourinary:  Negative for dysuria, frequency and urgency.   Musculoskeletal:  Negative for back pain, myalgias and neck pain.   Skin:  Negative for color change, pallor and rash.   Allergic/Immunologic: Negative for environmental allergies and food allergies.   Neurological:  Negative for dizziness, seizures, weakness and headaches.   Hematological:  Bruises/bleeds easily.   Psychiatric/Behavioral:  Negative for dysphoric mood and suicidal ideas. The patient is not nervous/anxious.        Allergies  No Known Allergies    Medications  Current Outpatient Medications   Medication Sig Dispense Refill    lisinopril (PRINIVIL;ZESTRIL) 10 MG tablet Take 1 tablet by mouth daily 90 tablet 1    metoprolol succinate (TOPROL XL) 50 MG extended release tablet Take 1 tablet by mouth daily 90 tablet 1    alendronate (FOSAMAX) 70 MG tablet Take 1 tablet by mouth every 7 days 13 tablet 3    solifenacin (VESICARE) 10 MG tablet Take 1 tablet by mouth daily 90 tablet 3    atorvastatin (LIPITOR) 10 MG tablet Take 1 tablet by mouth daily 90 tablet 3    dabigatran (PRADAXA) 150 MG capsule Take 1 capsule by mouth 2 times daily      Ascorbic Acid (VITAMIN C) 500 MG tablet Take 2 tablets by mouth daily      calcium-vitamin D (OSCAL-500) 500-200 MG-UNIT per tablet Take 1 tablet by mouth daily      rivaroxaban (XARELTO) 15 MG TABS tablet Take 1 tablet by mouth (Patient not

## 2025-03-21 ENCOUNTER — TELEPHONE (OUTPATIENT)
Dept: GASTROENTEROLOGY | Age: 85
End: 2025-03-21

## 2025-03-21 ENCOUNTER — HOSPITAL ENCOUNTER (OUTPATIENT)
Dept: MRI IMAGING | Age: 85
Discharge: HOME | End: 2025-03-21

## 2025-03-21 DIAGNOSIS — R93.89 ABNORMAL FINDING ON CT SCAN: ICD-10-CM

## 2025-03-21 DIAGNOSIS — K83.8 DILATION OF COMMON BILE DUCT: ICD-10-CM

## 2025-03-21 NOTE — PROGRESS NOTES
Pt was here today to have MRI w/wo C+ abd done - however pt was unable to tolerate the breath  holds due to coughing - we were unable to perform the test

## 2025-03-21 NOTE — TELEPHONE ENCOUNTER
Patient called in to let Sammi know that she went for her MRI and that they had to stop it twice because she kept coughing and they could not proceed on with it and she wanted to let this office know.  Thank you

## 2025-03-27 ENCOUNTER — TELEPHONE (OUTPATIENT)
Dept: FAMILY MEDICINE CLINIC | Age: 85
End: 2025-03-27

## 2025-03-27 NOTE — TELEPHONE ENCOUNTER
Pt called and was wondering her next step is due to her not being able to do the MRI.  She is still having coughing spells

## 2025-06-02 ENCOUNTER — OFFICE VISIT (OUTPATIENT)
Dept: GASTROENTEROLOGY | Age: 85
End: 2025-06-02
Payer: MEDICARE

## 2025-06-02 VITALS
WEIGHT: 123.2 LBS | SYSTOLIC BLOOD PRESSURE: 118 MMHG | HEIGHT: 58 IN | HEART RATE: 67 BPM | RESPIRATION RATE: 16 BRPM | OXYGEN SATURATION: 97 % | BODY MASS INDEX: 25.86 KG/M2 | DIASTOLIC BLOOD PRESSURE: 62 MMHG

## 2025-06-02 DIAGNOSIS — R05.3 CHRONIC COUGH: ICD-10-CM

## 2025-06-02 DIAGNOSIS — R93.89 ABNORMAL FINDING ON CT SCAN: Primary | ICD-10-CM

## 2025-06-02 DIAGNOSIS — K83.8 COMMON BILE DUCT DILATION: ICD-10-CM

## 2025-06-02 PROCEDURE — G2211 COMPLEX E/M VISIT ADD ON: HCPCS | Performed by: NURSE PRACTITIONER

## 2025-06-02 PROCEDURE — 1123F ACP DISCUSS/DSCN MKR DOCD: CPT | Performed by: NURSE PRACTITIONER

## 2025-06-02 PROCEDURE — 1159F MED LIST DOCD IN RCRD: CPT | Performed by: NURSE PRACTITIONER

## 2025-06-02 PROCEDURE — 3078F DIAST BP <80 MM HG: CPT | Performed by: NURSE PRACTITIONER

## 2025-06-02 PROCEDURE — 99213 OFFICE O/P EST LOW 20 MIN: CPT | Performed by: NURSE PRACTITIONER

## 2025-06-02 PROCEDURE — 3074F SYST BP LT 130 MM HG: CPT | Performed by: NURSE PRACTITIONER

## 2025-06-02 NOTE — PROGRESS NOTES
Marta Stahl 85 y.o. female was seen by GODWIN Cavazos on 06/02/25     Wt Readings from Last 3 Encounters:   06/02/25 55.9 kg (123 lb 3.2 oz)   03/03/25 57.1 kg (125 lb 12.8 oz)   01/29/25 55.4 kg (122 lb 3.2 oz)       HPI  Marta Stahl is a pleasant 85 y.o.  female who presents today for follow-up on abnormal finding on CT indicating possible biliary ductal dilation.  She recalled having a consistent cough that started nine months ago.  She saw ENT and was told she had allergic rhinitis and seasonal allergies.  Her CT of the chest done on 2- showed no focal consolidation. bilateral atelectasis, status post cholecystectomy with intrahepatic biliary ductal dilation greater than expected for a postcholecystectomy patient, multiple compression fractures in the spine, some of uncertain acuity given the absence of comparison imaging.  She attempted MRI of abdomen to further evaluate biliary ductal dilation but due to her chronic cough she was unable to complete.  Her lab work done on 3-3-2025 showed RBC 4.25, Hgb 13.8, Hct 43.1, Platelets 161, Total Bilirubin 0.6, Alkaline Phosphatase 50, ALT 8 and AST 17.  Her appetite is good and weight is stable.  No nausea or vomiting.  No abdominal pain, bloating or distention.  No heartburn or acid reflux.  No nocturnal awakenings with acid reflux.  No dysphagia or pain with swallowing.  No excess belching or flatulence.  She denies changes in her bowel pattern. Her typical bowel pattern is daily with soft brown formed stools.  No diarrhea or constipation.  No blood in her stools or melena.  No family history of stomach or colon cancer.     ROS  Review of Systems   Constitutional:  Negative for chills, diaphoresis, fatigue and fever.   HENT:  Positive for tinnitus. Negative for ear pain and hearing loss.    Eyes:  Positive for visual disturbance. Negative for photophobia and pain.   Respiratory:  Positive for cough. Negative for shortness of breath

## 2025-06-03 ENCOUNTER — HOSPITAL ENCOUNTER (OUTPATIENT)
Age: 85
Discharge: HOME | End: 2025-06-03
Payer: MEDICARE

## 2025-06-03 DIAGNOSIS — R93.89 ABNORMAL FINDING ON CT SCAN: ICD-10-CM

## 2025-06-03 LAB
ALBUMIN SERPL-MCNC: 3.9 G/DL (ref 3.4–5)
ALBUMIN/GLOB SERPL: 1.7 {RATIO} (ref 1.1–2.2)
ALP SERPL-CCNC: 56 U/L (ref 40–129)
ALT SERPL-CCNC: 13 U/L (ref 10–40)
ANION GAP SERPL CALCULATED.3IONS-SCNC: 7 MMOL/L (ref 9–17)
AST SERPL-CCNC: 18 U/L (ref 15–37)
BASOPHILS # BLD: 0.04 K/UL
BASOPHILS NFR BLD: 1 % (ref 0–1)
BILIRUB SERPL-MCNC: 0.5 MG/DL (ref 0–1)
BUN SERPL-MCNC: 18 MG/DL (ref 7–20)
CALCIUM SERPL-MCNC: 9.3 MG/DL (ref 8.3–10.6)
CHLORIDE SERPL-SCNC: 105 MMOL/L (ref 99–110)
CO2 SERPL-SCNC: 29 MMOL/L (ref 21–32)
CREAT SERPL-MCNC: 0.8 MG/DL (ref 0.6–1.2)
EOSINOPHIL # BLD: 0.11 K/UL
EOSINOPHILS RELATIVE PERCENT: 2 % (ref 0–3)
ERYTHROCYTE [DISTWIDTH] IN BLOOD BY AUTOMATED COUNT: 12.4 % (ref 11.7–14.9)
GFR, ESTIMATED: 72 ML/MIN/1.73M2
GLUCOSE SERPL-MCNC: 87 MG/DL (ref 74–99)
HCT VFR BLD AUTO: 43.3 % (ref 37–47)
HGB BLD-MCNC: 13.5 G/DL (ref 12.5–16)
IMM GRANULOCYTES # BLD AUTO: 0.02 K/UL
IMM GRANULOCYTES NFR BLD: 0 %
LYMPHOCYTES NFR BLD: 1.74 K/UL
LYMPHOCYTES RELATIVE PERCENT: 26 % (ref 24–44)
MCH RBC QN AUTO: 32.5 PG (ref 27–31)
MCHC RBC AUTO-ENTMCNC: 31.2 G/DL (ref 32–36)
MCV RBC AUTO: 104.1 FL (ref 78–100)
MONOCYTES NFR BLD: 0.57 K/UL
MONOCYTES NFR BLD: 9 % (ref 0–5)
NEUTROPHILS NFR BLD: 63 % (ref 36–66)
NEUTS SEG NFR BLD: 4.22 K/UL
PLATELET # BLD AUTO: 167 K/UL (ref 140–440)
PMV BLD AUTO: 10.5 FL (ref 7.5–11.1)
POTASSIUM SERPL-SCNC: 4.4 MMOL/L (ref 3.5–5.1)
PROT SERPL-MCNC: 6.3 G/DL (ref 6.4–8.2)
RBC # BLD AUTO: 4.16 M/UL (ref 4.2–5.4)
SODIUM SERPL-SCNC: 141 MMOL/L (ref 136–145)
WBC OTHER # BLD: 6.7 K/UL (ref 4–10.5)

## 2025-06-03 PROCEDURE — 85025 COMPLETE CBC W/AUTO DIFF WBC: CPT

## 2025-06-03 PROCEDURE — 80053 COMPREHEN METABOLIC PANEL: CPT

## 2025-06-05 ENCOUNTER — RESULTS FOLLOW-UP (OUTPATIENT)
Dept: GASTROENTEROLOGY | Age: 85
End: 2025-06-05

## 2025-06-20 ENCOUNTER — HOSPITAL ENCOUNTER (OUTPATIENT)
Dept: MRI IMAGING | Age: 85
Discharge: HOME | End: 2025-06-20
Payer: MEDICARE

## 2025-06-20 DIAGNOSIS — R93.89 ABNORMAL FINDING ON CT SCAN: ICD-10-CM

## 2025-06-20 PROCEDURE — 74183 MRI ABD W/O CNTR FLWD CNTR: CPT

## 2025-06-20 PROCEDURE — A9577 INJ MULTIHANCE: HCPCS | Performed by: NURSE PRACTITIONER

## 2025-06-20 PROCEDURE — 6360000004 HC RX CONTRAST MEDICATION: Performed by: NURSE PRACTITIONER

## 2025-06-20 RX ADMIN — GADOBENATE DIMEGLUMINE 11 ML: 529 INJECTION, SOLUTION INTRAVENOUS at 12:48

## 2025-07-01 DIAGNOSIS — M81.0 AGE-RELATED OSTEOPOROSIS WITHOUT CURRENT PATHOLOGICAL FRACTURE: ICD-10-CM

## 2025-07-01 RX ORDER — ALENDRONATE SODIUM 70 MG/1
70 TABLET ORAL
Qty: 12 TABLET | Refills: 3 | OUTPATIENT
Start: 2025-07-01

## 2025-07-30 ENCOUNTER — OFFICE VISIT (OUTPATIENT)
Dept: FAMILY MEDICINE CLINIC | Age: 85
End: 2025-07-30

## 2025-07-30 VITALS
OXYGEN SATURATION: 95 % | BODY MASS INDEX: 25.3 KG/M2 | HEART RATE: 68 BPM | DIASTOLIC BLOOD PRESSURE: 80 MMHG | WEIGHT: 121 LBS | SYSTOLIC BLOOD PRESSURE: 146 MMHG

## 2025-07-30 DIAGNOSIS — E78.5 HYPERLIPIDEMIA, UNSPECIFIED HYPERLIPIDEMIA TYPE: ICD-10-CM

## 2025-07-30 DIAGNOSIS — I10 ESSENTIAL HYPERTENSION: Primary | Chronic | ICD-10-CM

## 2025-07-30 DIAGNOSIS — I47.10 SVT (SUPRAVENTRICULAR TACHYCARDIA): ICD-10-CM

## 2025-07-30 DIAGNOSIS — G25.0 ESSENTIAL TREMOR: ICD-10-CM

## 2025-07-30 DIAGNOSIS — F43.21 GRIEF: ICD-10-CM

## 2025-07-30 DIAGNOSIS — M81.0 AGE-RELATED OSTEOPOROSIS WITHOUT CURRENT PATHOLOGICAL FRACTURE: ICD-10-CM

## 2025-07-30 RX ORDER — ALENDRONATE SODIUM 70 MG/1
70 TABLET ORAL
Qty: 13 TABLET | Refills: 3 | Status: SHIPPED | OUTPATIENT
Start: 2025-07-30

## 2025-07-30 RX ORDER — LISINOPRIL 10 MG/1
10 TABLET ORAL DAILY
Qty: 90 TABLET | Refills: 1 | Status: SHIPPED | OUTPATIENT
Start: 2025-07-30

## 2025-07-30 RX ORDER — ATORVASTATIN CALCIUM 10 MG/1
10 TABLET, FILM COATED ORAL DAILY
Qty: 90 TABLET | Refills: 3 | Status: SHIPPED | OUTPATIENT
Start: 2025-07-30

## 2025-07-30 RX ORDER — METOPROLOL SUCCINATE 50 MG/1
50 TABLET, EXTENDED RELEASE ORAL DAILY
Qty: 90 TABLET | Refills: 1 | Status: SHIPPED | OUTPATIENT
Start: 2025-07-30

## 2025-07-30 ASSESSMENT — ENCOUNTER SYMPTOMS: SHORTNESS OF BREATH: 0

## 2025-07-30 NOTE — PROGRESS NOTES
Patient ID: Marta Stahl 1940    .  Chief Complaint   Patient presents with    Hyperlipidemia    Hypertension         Hyperlipidemia  This is a chronic problem. The current episode started more than 1 year ago. Pertinent negatives include no shortness of breath. Current antihyperlipidemic treatment includes statins.   Hypertension  This is a chronic problem. The current episode started more than 1 year ago. The problem is unchanged. The problem is controlled. Pertinent negatives include no palpitations or shortness of breath. Risk factors for coronary artery disease include post-menopausal state. Past treatments include beta blockers and diuretics.     She is not taking one of her medications.  She is not sure which one.  Did not bring medication list.    Grief: great grand daughter just committed suicide--age 10      Osteoporosis: Is on Fosamax and calcium with vitamin D.    Patient Active Problem List   Diagnosis    Essential hypertension    Osteoporosis    Hx of colonic polyps    Degenerative disc disease, lumbar    Spondylolisthesis of lumbar region    Osteoarthritis of cervical spine    SVT (supraventricular tachycardia)    Chronic left-sided low back pain with left-sided sciatica    Nonrheumatic aortic valve insufficiency    Mitral regurgitation    History of compression fracture of spine    Anticoagulated    Paroxysmal atrial fibrillation (HCC)    Nondisplaced intertrochanteric fracture of left femur, subsequent encounter for closed fracture with routine healing    Bilateral primary osteoarthritis of knee    S/p left hip fracture    Age-related osteoporosis with current pathol fracture of vertebra, initial encounter ([facility])    Age-related osteoporosis with current pathological fracture, vertebra(e), sequela         Current Outpatient Medications on File Prior to Visit   Medication Sig Dispense Refill    dabigatran (PRADAXA) 150 MG capsule Take 1 capsule by mouth 2 times daily      rivaroxaban (XARELTO)  [Takes medication as prescribed] : takes [None] : Patient does not have any barriers to medication adherence

## 2025-09-02 ENCOUNTER — OFFICE VISIT (OUTPATIENT)
Dept: GASTROENTEROLOGY | Age: 85
End: 2025-09-02
Payer: MEDICARE

## 2025-09-02 VITALS
WEIGHT: 119.5 LBS | HEART RATE: 56 BPM | DIASTOLIC BLOOD PRESSURE: 84 MMHG | OXYGEN SATURATION: 95 % | BODY MASS INDEX: 25.08 KG/M2 | SYSTOLIC BLOOD PRESSURE: 118 MMHG | HEIGHT: 58 IN

## 2025-09-02 DIAGNOSIS — K83.8 DILATION OF COMMON BILE DUCT: ICD-10-CM

## 2025-09-02 DIAGNOSIS — R93.89 ABNORMAL FINDING ON CT SCAN: Primary | ICD-10-CM

## 2025-09-02 PROCEDURE — 99213 OFFICE O/P EST LOW 20 MIN: CPT | Performed by: NURSE PRACTITIONER

## 2025-09-02 PROCEDURE — 1123F ACP DISCUSS/DSCN MKR DOCD: CPT | Performed by: NURSE PRACTITIONER

## 2025-09-02 PROCEDURE — 1159F MED LIST DOCD IN RCRD: CPT | Performed by: NURSE PRACTITIONER

## 2025-09-02 PROCEDURE — 3079F DIAST BP 80-89 MM HG: CPT | Performed by: NURSE PRACTITIONER

## 2025-09-02 PROCEDURE — G2211 COMPLEX E/M VISIT ADD ON: HCPCS | Performed by: NURSE PRACTITIONER

## 2025-09-02 PROCEDURE — 3074F SYST BP LT 130 MM HG: CPT | Performed by: NURSE PRACTITIONER

## 2025-09-02 PROCEDURE — 1160F RVW MEDS BY RX/DR IN RCRD: CPT | Performed by: NURSE PRACTITIONER

## (undated) DEVICE — PADDING CAST W6INXL4YD COT COHESIVE HND TEARABLE SPEC 100

## (undated) DEVICE — Z DISCONTINUED PER MEDLINE DRESSING ALG W9XL10CM SIL CVR WTRPRF VIR BACT BARR ANTIMIC

## (undated) DEVICE — ELECTRODE ES AD CRDLSS PT RET REM POLYHESIVE

## (undated) DEVICE — PENCIL ES CRD L10FT HND SWCHING ROCK SWCH W/ EDGE COAT BLDE

## (undated) DEVICE — INTENDED FOR TISSUE SEPARATION, AND OTHER PROCEDURES THAT REQUIRE A SHARP SURGICAL BLADE TO PUNCTURE OR CUT.: Brand: BARD-PARKER ® STAINLESS STEEL BLADES

## (undated) DEVICE — YANKAUER,FLEXIBLE HANDLE,REGLR CAPACITY: Brand: MEDLINE INDUSTRIES, INC.

## (undated) DEVICE — PACK,BASIC,IX: Brand: MEDLINE

## (undated) DEVICE — GUIDEWIRE THREADED TROCAR TIP

## (undated) DEVICE — SYRINGE IRRIG 60ML SFT PLIABLE BLB EZ TO GRP 1 HND USE W/

## (undated) DEVICE — SPONGE GZ W4XL8IN COT WVN 12 PLY

## (undated) DEVICE — COUNTER NDL 30 COUNT FOAM STRP SGL MAG

## (undated) DEVICE — SUTURE VCRL SZ 0 L18IN ABSRB UD L36MM CT-1 1/2 CIR J840D

## (undated) DEVICE — BANDAGE,GAUZE,BULKEE II,4.5"X4.1YD,STRL: Brand: MEDLINE

## (undated) DEVICE — SOLUTION IV IRRIG WATER 1000ML POUR BRL 2F7114

## (undated) DEVICE — BIT DRILL EXTREME .4MM TIP

## (undated) DEVICE — TOWEL,OR,DSP,ST,BLUE,STD,6/PK,12PK/CS: Brand: MEDLINE

## (undated) DEVICE — MAT FLOOR ULTRA ABS 28X48IN

## (undated) DEVICE — TUBING, SUCTION, 9/32" X 10', STRAIGHT: Brand: MEDLINE

## (undated) DEVICE — CHLORAPREP 26ML ORANGE

## (undated) DEVICE — SYRINGE MED 30ML STD CLR PLAS LUERLOCK TIP N CTRL DISP

## (undated) DEVICE — MARKER SURG SKIN UTIL REGULAR/FINE 2 TIP W/ RUL AND 9 LBL

## (undated) DEVICE — GLOVE ORANGE PI 7 1/2   MSG9075

## (undated) DEVICE — NEEDLE HYPO 20GA L1.5IN YEL POLYPR HUB S STL REG BVL STR

## (undated) DEVICE — GLOVE SURG SZ 8 L12IN THK75MIL DK GRN LTX FREE

## (undated) DEVICE — 6617 IOBAN II PATIENT ISOLATION DRAPE 5/BX,4BX/CS: Brand: STERI-DRAPE™ IOBAN™ 2

## (undated) DEVICE — Device

## (undated) DEVICE — GUIDEWIRE WITH OLIVE

## (undated) DEVICE — GOWN,SIRUS,POLYRNF,BRTHSLV,XLN/XL,20/CS: Brand: MEDLINE

## (undated) DEVICE — SUTURE VCRL SZ 2-0 L18IN ABSRB UD CT-1 L36MM 1/2 CIR J839D

## (undated) DEVICE — GOWN,ECLIPSE,POLYRNF,BRTHSLV,L,30/CS: Brand: MEDLINE

## (undated) DEVICE — COVER,C-ARM,41X74: Brand: MEDLINE

## (undated) DEVICE — THREE QUARTER SHEET: Brand: CONVERTORS

## (undated) DEVICE — ROD RMR L950MM DIA2.5MM W/ EXTN BALL TIP

## (undated) DEVICE — TAPE SURG W4INXL11YD 2IN PERF LINERLESS NONWOVEN MEDFIX EZ

## (undated) DEVICE — GLOVE ORANGE PI 8   MSG9080

## (undated) DEVICE — DRESSING PETRO W3XL3IN OIL EMUL N ADH GZ KNIT IMPREG CELOS

## (undated) DEVICE — BANDAGE,SELF ADHRNT,COFLEX,4"X5YD,STRL: Brand: COLABEL

## (undated) DEVICE — BIT DRL L320MM DIA4.3MM CALIB FOR PHOENIX SYS

## (undated) DEVICE — SPONGE LAP W18XL18IN WHT COT 4 PLY FLD STRUNG RADPQ DISP ST

## (undated) DEVICE — DRAPE SHEET ULTRAGARD: Brand: MEDLINE

## (undated) DEVICE — LINER,SEMI-RIGID,3000CC,50EA/CS: Brand: MEDLINE